# Patient Record
Sex: MALE | Race: WHITE | NOT HISPANIC OR LATINO | URBAN - METROPOLITAN AREA
[De-identification: names, ages, dates, MRNs, and addresses within clinical notes are randomized per-mention and may not be internally consistent; named-entity substitution may affect disease eponyms.]

---

## 2019-02-22 PROBLEM — Z00.00 ENCOUNTER FOR PREVENTIVE HEALTH EXAMINATION: Status: ACTIVE | Noted: 2019-02-22

## 2021-01-01 ENCOUNTER — INPATIENT (INPATIENT)
Facility: HOSPITAL | Age: 71
LOS: 9 days | End: 2021-07-14
Attending: INTERNAL MEDICINE | Admitting: INTERNAL MEDICINE
Payer: MEDICARE

## 2021-01-01 VITALS
RESPIRATION RATE: 22 BRPM | OXYGEN SATURATION: 97 % | DIASTOLIC BLOOD PRESSURE: 75 MMHG | HEART RATE: 63 BPM | SYSTOLIC BLOOD PRESSURE: 167 MMHG | TEMPERATURE: 98 F

## 2021-01-01 VITALS — OXYGEN SATURATION: 88 % | RESPIRATION RATE: 8 BRPM | HEART RATE: 82 BPM

## 2021-01-01 DIAGNOSIS — J96.90 RESPIRATORY FAILURE, UNSPECIFIED, UNSPECIFIED WHETHER WITH HYPOXIA OR HYPERCAPNIA: ICD-10-CM

## 2021-01-01 DIAGNOSIS — Z98.890 OTHER SPECIFIED POSTPROCEDURAL STATES: Chronic | ICD-10-CM

## 2021-01-01 DIAGNOSIS — J96.01 ACUTE RESPIRATORY FAILURE WITH HYPOXIA: ICD-10-CM

## 2021-01-01 DIAGNOSIS — Z86.69 PERSONAL HISTORY OF OTHER DISEASES OF THE NERVOUS SYSTEM AND SENSE ORGANS: ICD-10-CM

## 2021-01-01 DIAGNOSIS — R53.2 FUNCTIONAL QUADRIPLEGIA: ICD-10-CM

## 2021-01-01 DIAGNOSIS — Z51.5 ENCOUNTER FOR PALLIATIVE CARE: ICD-10-CM

## 2021-01-01 DIAGNOSIS — Z71.89 OTHER SPECIFIED COUNSELING: ICD-10-CM

## 2021-01-01 LAB
-  AMIKACIN: SIGNIFICANT CHANGE UP
-  AMOXICILLIN/CLAVULANIC ACID: SIGNIFICANT CHANGE UP
-  AMPICILLIN/SULBACTAM: SIGNIFICANT CHANGE UP
-  AMPICILLIN: SIGNIFICANT CHANGE UP
-  AZTREONAM: SIGNIFICANT CHANGE UP
-  CEFAZOLIN: SIGNIFICANT CHANGE UP
-  CEFEPIME: SIGNIFICANT CHANGE UP
-  CEFOXITIN: SIGNIFICANT CHANGE UP
-  CEFTRIAXONE: SIGNIFICANT CHANGE UP
-  CIPROFLOXACIN: SIGNIFICANT CHANGE UP
-  ERTAPENEM: SIGNIFICANT CHANGE UP
-  GENTAMICIN: SIGNIFICANT CHANGE UP
-  IMIPENEM: SIGNIFICANT CHANGE UP
-  LEVOFLOXACIN: SIGNIFICANT CHANGE UP
-  MEROPENEM: SIGNIFICANT CHANGE UP
-  NITROFURANTOIN: SIGNIFICANT CHANGE UP
-  PIPERACILLIN/TAZOBACTAM: SIGNIFICANT CHANGE UP
-  TIGECYCLINE: SIGNIFICANT CHANGE UP
-  TOBRAMYCIN: SIGNIFICANT CHANGE UP
-  TRIMETHOPRIM/SULFAMETHOXAZOLE: SIGNIFICANT CHANGE UP
A1C WITH ESTIMATED AVERAGE GLUCOSE RESULT: 5.2 % — SIGNIFICANT CHANGE UP (ref 4–5.6)
ACHR BLOCK AB SER-ACNC: 17 % — SIGNIFICANT CHANGE UP (ref 0–25)
ACHR MOD AB SER-ACNC: <12 % — SIGNIFICANT CHANGE UP (ref 0–20)
ACRM MODULATING ANTIBODY: <0.03 NMOL/L — SIGNIFICANT CHANGE UP (ref 0–0.24)
ALBUMIN SERPL ELPH-MCNC: 2.5 G/DL — LOW (ref 3.3–5)
ALBUMIN SERPL ELPH-MCNC: 2.6 G/DL — LOW (ref 3.3–5)
ALBUMIN SERPL ELPH-MCNC: 2.6 G/DL — LOW (ref 3.3–5)
ALBUMIN SERPL ELPH-MCNC: 2.7 G/DL — LOW (ref 3.3–5)
ALBUMIN SERPL ELPH-MCNC: 2.8 G/DL — LOW (ref 3.3–5)
ALBUMIN SERPL ELPH-MCNC: 2.9 G/DL — LOW (ref 3.3–5)
ALBUMIN SERPL ELPH-MCNC: 3.1 G/DL — LOW (ref 3.3–5)
ALBUMIN SERPL ELPH-MCNC: 3.1 G/DL — LOW (ref 3.3–5)
ALBUMIN SERPL ELPH-MCNC: 3.6 G/DL — SIGNIFICANT CHANGE UP (ref 3.3–5)
ALBUMIN SERPL ELPH-MCNC: 3.7 G/DL — SIGNIFICANT CHANGE UP (ref 3.3–5)
ALBUMIN SERPL ELPH-MCNC: 4.4 G/DL — SIGNIFICANT CHANGE UP (ref 3.3–5)
ALP SERPL-CCNC: 50 U/L — SIGNIFICANT CHANGE UP (ref 40–120)
ALP SERPL-CCNC: 50 U/L — SIGNIFICANT CHANGE UP (ref 40–120)
ALP SERPL-CCNC: 53 U/L — SIGNIFICANT CHANGE UP (ref 40–120)
ALP SERPL-CCNC: 54 U/L — SIGNIFICANT CHANGE UP (ref 40–120)
ALP SERPL-CCNC: 57 U/L — SIGNIFICANT CHANGE UP (ref 40–120)
ALP SERPL-CCNC: 59 U/L — SIGNIFICANT CHANGE UP (ref 40–120)
ALP SERPL-CCNC: 60 U/L — SIGNIFICANT CHANGE UP (ref 40–120)
ALP SERPL-CCNC: 63 U/L — SIGNIFICANT CHANGE UP (ref 40–120)
ALP SERPL-CCNC: 63 U/L — SIGNIFICANT CHANGE UP (ref 40–120)
ALP SERPL-CCNC: 65 U/L — SIGNIFICANT CHANGE UP (ref 40–120)
ALP SERPL-CCNC: 67 U/L — SIGNIFICANT CHANGE UP (ref 40–120)
ALP SERPL-CCNC: 69 U/L — SIGNIFICANT CHANGE UP (ref 40–120)
ALP SERPL-CCNC: 73 U/L — SIGNIFICANT CHANGE UP (ref 40–120)
ALP SERPL-CCNC: 87 U/L — SIGNIFICANT CHANGE UP (ref 40–120)
ALT FLD-CCNC: 20 U/L — SIGNIFICANT CHANGE UP (ref 4–41)
ALT FLD-CCNC: 20 U/L — SIGNIFICANT CHANGE UP (ref 4–41)
ALT FLD-CCNC: 22 U/L — SIGNIFICANT CHANGE UP (ref 4–41)
ALT FLD-CCNC: 23 U/L — SIGNIFICANT CHANGE UP (ref 4–41)
ALT FLD-CCNC: 24 U/L — SIGNIFICANT CHANGE UP (ref 4–41)
ALT FLD-CCNC: 27 U/L — SIGNIFICANT CHANGE UP (ref 4–41)
ALT FLD-CCNC: 27 U/L — SIGNIFICANT CHANGE UP (ref 4–41)
ALT FLD-CCNC: 30 U/L — SIGNIFICANT CHANGE UP (ref 4–41)
ALT FLD-CCNC: 30 U/L — SIGNIFICANT CHANGE UP (ref 4–41)
ALT FLD-CCNC: 35 U/L — SIGNIFICANT CHANGE UP (ref 4–41)
ALT FLD-CCNC: 62 U/L — HIGH (ref 4–41)
ALT FLD-CCNC: 69 U/L — HIGH (ref 4–41)
ALT FLD-CCNC: 70 U/L — HIGH (ref 4–41)
ALT FLD-CCNC: 78 U/L — HIGH (ref 4–41)
ANION GAP SERPL CALC-SCNC: 10 MMOL/L — SIGNIFICANT CHANGE UP (ref 7–14)
ANION GAP SERPL CALC-SCNC: 11 MMOL/L — SIGNIFICANT CHANGE UP (ref 7–14)
ANION GAP SERPL CALC-SCNC: 12 MMOL/L — SIGNIFICANT CHANGE UP (ref 7–14)
ANION GAP SERPL CALC-SCNC: 13 MMOL/L — SIGNIFICANT CHANGE UP (ref 7–14)
ANION GAP SERPL CALC-SCNC: 13 MMOL/L — SIGNIFICANT CHANGE UP (ref 7–14)
ANION GAP SERPL CALC-SCNC: 15 MMOL/L — HIGH (ref 7–14)
ANION GAP SERPL CALC-SCNC: 8 MMOL/L — SIGNIFICANT CHANGE UP (ref 7–14)
ANION GAP SERPL CALC-SCNC: 9 MMOL/L — SIGNIFICANT CHANGE UP (ref 7–14)
ANION GAP SERPL CALC-SCNC: 9 MMOL/L — SIGNIFICANT CHANGE UP (ref 7–14)
APPEARANCE UR: CLEAR — SIGNIFICANT CHANGE UP
APTT BLD: 27.2 SEC — SIGNIFICANT CHANGE UP (ref 27–36.3)
APTT BLD: 30.9 SEC — SIGNIFICANT CHANGE UP (ref 27–36.3)
AST SERPL-CCNC: 17 U/L — SIGNIFICANT CHANGE UP (ref 4–40)
AST SERPL-CCNC: 23 U/L — SIGNIFICANT CHANGE UP (ref 4–40)
AST SERPL-CCNC: 24 U/L — SIGNIFICANT CHANGE UP (ref 4–40)
AST SERPL-CCNC: 26 U/L — SIGNIFICANT CHANGE UP (ref 4–40)
AST SERPL-CCNC: 26 U/L — SIGNIFICANT CHANGE UP (ref 4–40)
AST SERPL-CCNC: 27 U/L — SIGNIFICANT CHANGE UP (ref 4–40)
AST SERPL-CCNC: 29 U/L — SIGNIFICANT CHANGE UP (ref 4–40)
AST SERPL-CCNC: 30 U/L — SIGNIFICANT CHANGE UP (ref 4–40)
AST SERPL-CCNC: 37 U/L — SIGNIFICANT CHANGE UP (ref 4–40)
AST SERPL-CCNC: 38 U/L — SIGNIFICANT CHANGE UP (ref 4–40)
AST SERPL-CCNC: 50 U/L — HIGH (ref 4–40)
AST SERPL-CCNC: 53 U/L — HIGH (ref 4–40)
AST SERPL-CCNC: 57 U/L — HIGH (ref 4–40)
AST SERPL-CCNC: 94 U/L — HIGH (ref 4–40)
B PERT DNA SPEC QL NAA+PROBE: SIGNIFICANT CHANGE UP
B PERT IGG+IGM PNL SER: ABNORMAL
BACTERIA # UR AUTO: ABNORMAL
BASE EXCESS BLDV CALC-SCNC: 4.2 MMOL/L — HIGH (ref -3–2)
BASE EXCESS BLDV CALC-SCNC: 5.1 MMOL/L — HIGH (ref -3–2)
BASE EXCESS BLDV CALC-SCNC: 8.8 MMOL/L — HIGH (ref -3–2)
BASOPHILS # BLD AUTO: 0 K/UL — SIGNIFICANT CHANGE UP (ref 0–0.2)
BASOPHILS # BLD AUTO: 0.01 K/UL — SIGNIFICANT CHANGE UP (ref 0–0.2)
BASOPHILS # BLD AUTO: 0.02 K/UL — SIGNIFICANT CHANGE UP (ref 0–0.2)
BASOPHILS # BLD AUTO: 0.03 K/UL — SIGNIFICANT CHANGE UP (ref 0–0.2)
BASOPHILS # BLD AUTO: 0.05 K/UL — SIGNIFICANT CHANGE UP (ref 0–0.2)
BASOPHILS # BLD AUTO: 0.05 K/UL — SIGNIFICANT CHANGE UP (ref 0–0.2)
BASOPHILS NFR BLD AUTO: 0 % — SIGNIFICANT CHANGE UP (ref 0–2)
BASOPHILS NFR BLD AUTO: 0.1 % — SIGNIFICANT CHANGE UP (ref 0–2)
BASOPHILS NFR BLD AUTO: 0.1 % — SIGNIFICANT CHANGE UP (ref 0–2)
BASOPHILS NFR BLD AUTO: 0.2 % — SIGNIFICANT CHANGE UP (ref 0–2)
BASOPHILS NFR BLD AUTO: 0.3 % — SIGNIFICANT CHANGE UP (ref 0–2)
BASOPHILS NFR BLD AUTO: 0.3 % — SIGNIFICANT CHANGE UP (ref 0–2)
BASOPHILS NFR BLD AUTO: 0.4 % — SIGNIFICANT CHANGE UP (ref 0–2)
BILIRUB SERPL-MCNC: 0.2 MG/DL — SIGNIFICANT CHANGE UP (ref 0.2–1.2)
BILIRUB SERPL-MCNC: 0.2 MG/DL — SIGNIFICANT CHANGE UP (ref 0.2–1.2)
BILIRUB SERPL-MCNC: 0.3 MG/DL — SIGNIFICANT CHANGE UP (ref 0.2–1.2)
BILIRUB SERPL-MCNC: 0.3 MG/DL — SIGNIFICANT CHANGE UP (ref 0.2–1.2)
BILIRUB SERPL-MCNC: 0.4 MG/DL — SIGNIFICANT CHANGE UP (ref 0.2–1.2)
BILIRUB SERPL-MCNC: 0.5 MG/DL — SIGNIFICANT CHANGE UP (ref 0.2–1.2)
BILIRUB SERPL-MCNC: 0.6 MG/DL — SIGNIFICANT CHANGE UP (ref 0.2–1.2)
BILIRUB SERPL-MCNC: 0.7 MG/DL — SIGNIFICANT CHANGE UP (ref 0.2–1.2)
BILIRUB UR-MCNC: NEGATIVE — SIGNIFICANT CHANGE UP
BLOOD GAS ARTERIAL - LYTES,HGB,ICA,LACT RESULT: SIGNIFICANT CHANGE UP
BLOOD GAS ARTERIAL - LYTES,HGB,ICA,LACT RESULT: SIGNIFICANT CHANGE UP
BLOOD GAS ARTERIAL COMPREHENSIVE RESULT: SIGNIFICANT CHANGE UP
BLOOD GAS VENOUS - CREATININE: 0.6 MG/DL — SIGNIFICANT CHANGE UP (ref 0.5–1.3)
BLOOD GAS VENOUS - CREATININE: 0.8 MG/DL — SIGNIFICANT CHANGE UP (ref 0.5–1.3)
BLOOD GAS VENOUS - CREATININE: SIGNIFICANT CHANGE UP MG/DL (ref 0.5–1.3)
BLOOD GAS VENOUS COMPREHENSIVE RESULT: SIGNIFICANT CHANGE UP
BUN SERPL-MCNC: 10 MG/DL — SIGNIFICANT CHANGE UP (ref 7–23)
BUN SERPL-MCNC: 12 MG/DL — SIGNIFICANT CHANGE UP (ref 7–23)
BUN SERPL-MCNC: 13 MG/DL — SIGNIFICANT CHANGE UP (ref 7–23)
BUN SERPL-MCNC: 14 MG/DL — SIGNIFICANT CHANGE UP (ref 7–23)
BUN SERPL-MCNC: 15 MG/DL — SIGNIFICANT CHANGE UP (ref 7–23)
BUN SERPL-MCNC: 16 MG/DL — SIGNIFICANT CHANGE UP (ref 7–23)
BUN SERPL-MCNC: 16 MG/DL — SIGNIFICANT CHANGE UP (ref 7–23)
BUN SERPL-MCNC: 17 MG/DL — SIGNIFICANT CHANGE UP (ref 7–23)
BUN SERPL-MCNC: 17 MG/DL — SIGNIFICANT CHANGE UP (ref 7–23)
BUN SERPL-MCNC: 18 MG/DL — SIGNIFICANT CHANGE UP (ref 7–23)
BUN SERPL-MCNC: 21 MG/DL — SIGNIFICANT CHANGE UP (ref 7–23)
BUN SERPL-MCNC: 22 MG/DL — SIGNIFICANT CHANGE UP (ref 7–23)
C PNEUM DNA SPEC QL NAA+PROBE: SIGNIFICANT CHANGE UP
CALCIUM SERPL-MCNC: 8 MG/DL — LOW (ref 8.4–10.5)
CALCIUM SERPL-MCNC: 8.2 MG/DL — LOW (ref 8.4–10.5)
CALCIUM SERPL-MCNC: 8.2 MG/DL — LOW (ref 8.4–10.5)
CALCIUM SERPL-MCNC: 8.3 MG/DL — LOW (ref 8.4–10.5)
CALCIUM SERPL-MCNC: 8.3 MG/DL — LOW (ref 8.4–10.5)
CALCIUM SERPL-MCNC: 8.5 MG/DL — SIGNIFICANT CHANGE UP (ref 8.4–10.5)
CALCIUM SERPL-MCNC: 8.6 MG/DL — SIGNIFICANT CHANGE UP (ref 8.4–10.5)
CALCIUM SERPL-MCNC: 8.7 MG/DL — SIGNIFICANT CHANGE UP (ref 8.4–10.5)
CALCIUM SERPL-MCNC: 8.8 MG/DL — SIGNIFICANT CHANGE UP (ref 8.4–10.5)
CALCIUM SERPL-MCNC: 8.8 MG/DL — SIGNIFICANT CHANGE UP (ref 8.4–10.5)
CALCIUM SERPL-MCNC: 9.1 MG/DL — SIGNIFICANT CHANGE UP (ref 8.4–10.5)
CALCIUM SERPL-MCNC: 9.2 MG/DL — SIGNIFICANT CHANGE UP (ref 8.4–10.5)
CALCIUM SERPL-MCNC: 9.3 MG/DL — SIGNIFICANT CHANGE UP (ref 8.4–10.5)
CALCIUM SERPL-MCNC: 9.3 MG/DL — SIGNIFICANT CHANGE UP (ref 8.4–10.5)
CHLORIDE BLDV-SCNC: 101 MMOL/L — SIGNIFICANT CHANGE UP (ref 96–108)
CHLORIDE BLDV-SCNC: 103 MMOL/L — SIGNIFICANT CHANGE UP (ref 96–108)
CHLORIDE BLDV-SCNC: 99 MMOL/L — SIGNIFICANT CHANGE UP (ref 96–108)
CHLORIDE SERPL-SCNC: 100 MMOL/L — SIGNIFICANT CHANGE UP (ref 98–107)
CHLORIDE SERPL-SCNC: 101 MMOL/L — SIGNIFICANT CHANGE UP (ref 98–107)
CHLORIDE SERPL-SCNC: 101 MMOL/L — SIGNIFICANT CHANGE UP (ref 98–107)
CHLORIDE SERPL-SCNC: 102 MMOL/L — SIGNIFICANT CHANGE UP (ref 98–107)
CHLORIDE SERPL-SCNC: 91 MMOL/L — LOW (ref 98–107)
CHLORIDE SERPL-SCNC: 92 MMOL/L — LOW (ref 98–107)
CHLORIDE SERPL-SCNC: 94 MMOL/L — LOW (ref 98–107)
CHLORIDE SERPL-SCNC: 98 MMOL/L — SIGNIFICANT CHANGE UP (ref 98–107)
CHLORIDE SERPL-SCNC: 98 MMOL/L — SIGNIFICANT CHANGE UP (ref 98–107)
CHLORIDE SERPL-SCNC: 99 MMOL/L — SIGNIFICANT CHANGE UP (ref 98–107)
CHLORIDE SERPL-SCNC: 99 MMOL/L — SIGNIFICANT CHANGE UP (ref 98–107)
CO2 SERPL-SCNC: 22 MMOL/L — SIGNIFICANT CHANGE UP (ref 22–31)
CO2 SERPL-SCNC: 23 MMOL/L — SIGNIFICANT CHANGE UP (ref 22–31)
CO2 SERPL-SCNC: 24 MMOL/L — SIGNIFICANT CHANGE UP (ref 22–31)
CO2 SERPL-SCNC: 25 MMOL/L — SIGNIFICANT CHANGE UP (ref 22–31)
CO2 SERPL-SCNC: 26 MMOL/L — SIGNIFICANT CHANGE UP (ref 22–31)
CO2 SERPL-SCNC: 27 MMOL/L — SIGNIFICANT CHANGE UP (ref 22–31)
CO2 SERPL-SCNC: 27 MMOL/L — SIGNIFICANT CHANGE UP (ref 22–31)
CO2 SERPL-SCNC: 30 MMOL/L — SIGNIFICANT CHANGE UP (ref 22–31)
CO2 SERPL-SCNC: 30 MMOL/L — SIGNIFICANT CHANGE UP (ref 22–31)
CO2 SERPL-SCNC: 32 MMOL/L — HIGH (ref 22–31)
COLOR FLD: SIGNIFICANT CHANGE UP
COLOR SPEC: SIGNIFICANT CHANGE UP
COLOR SPEC: YELLOW — SIGNIFICANT CHANGE UP
COLOR SPEC: YELLOW — SIGNIFICANT CHANGE UP
CORTIS AM PEAK SERPL-MCNC: 7.1 UG/DL — SIGNIFICANT CHANGE UP (ref 6–18.4)
COVID-19 NUCLEOCAPSID GAM AB INTERP: NEGATIVE — SIGNIFICANT CHANGE UP
COVID-19 NUCLEOCAPSID TOTAL GAM ANTIBODY RESULT: 0.11 INDEX — SIGNIFICANT CHANGE UP
COVID-19 SPIKE DOMAIN AB INTERP: POSITIVE
COVID-19 SPIKE DOMAIN ANTIBODY RESULT: >250 U/ML — HIGH
CREAT ?TM UR-MCNC: 173 MG/DL — SIGNIFICANT CHANGE UP
CREAT SERPL-MCNC: 0.46 MG/DL — LOW (ref 0.5–1.3)
CREAT SERPL-MCNC: 0.52 MG/DL — SIGNIFICANT CHANGE UP (ref 0.5–1.3)
CREAT SERPL-MCNC: 0.53 MG/DL — SIGNIFICANT CHANGE UP (ref 0.5–1.3)
CREAT SERPL-MCNC: 0.55 MG/DL — SIGNIFICANT CHANGE UP (ref 0.5–1.3)
CREAT SERPL-MCNC: 0.55 MG/DL — SIGNIFICANT CHANGE UP (ref 0.5–1.3)
CREAT SERPL-MCNC: 0.56 MG/DL — SIGNIFICANT CHANGE UP (ref 0.5–1.3)
CREAT SERPL-MCNC: 0.57 MG/DL — SIGNIFICANT CHANGE UP (ref 0.5–1.3)
CREAT SERPL-MCNC: 0.62 MG/DL — SIGNIFICANT CHANGE UP (ref 0.5–1.3)
CREAT SERPL-MCNC: 0.67 MG/DL — SIGNIFICANT CHANGE UP (ref 0.5–1.3)
CREAT SERPL-MCNC: 0.68 MG/DL — SIGNIFICANT CHANGE UP (ref 0.5–1.3)
CREAT SERPL-MCNC: 0.73 MG/DL — SIGNIFICANT CHANGE UP (ref 0.5–1.3)
CREAT SERPL-MCNC: 0.89 MG/DL — SIGNIFICANT CHANGE UP (ref 0.5–1.3)
CULTURE RESULTS: SIGNIFICANT CHANGE UP
DIFF PNL FLD: ABNORMAL
EOSINOPHIL # BLD AUTO: 0 K/UL — SIGNIFICANT CHANGE UP (ref 0–0.5)
EOSINOPHIL # BLD AUTO: 0.01 K/UL — SIGNIFICANT CHANGE UP (ref 0–0.5)
EOSINOPHIL # BLD AUTO: 0.01 K/UL — SIGNIFICANT CHANGE UP (ref 0–0.5)
EOSINOPHIL # BLD AUTO: 0.02 K/UL — SIGNIFICANT CHANGE UP (ref 0–0.5)
EOSINOPHIL # BLD AUTO: 0.05 K/UL — SIGNIFICANT CHANGE UP (ref 0–0.5)
EOSINOPHIL # BLD AUTO: 0.1 K/UL — SIGNIFICANT CHANGE UP (ref 0–0.5)
EOSINOPHIL # BLD AUTO: 0.11 K/UL — SIGNIFICANT CHANGE UP (ref 0–0.5)
EOSINOPHIL # BLD AUTO: 0.12 K/UL — SIGNIFICANT CHANGE UP (ref 0–0.5)
EOSINOPHIL # BLD AUTO: 0.13 K/UL — SIGNIFICANT CHANGE UP (ref 0–0.5)
EOSINOPHIL # FLD: 0 % — SIGNIFICANT CHANGE UP
EOSINOPHIL NFR BLD AUTO: 0 % — SIGNIFICANT CHANGE UP (ref 0–6)
EOSINOPHIL NFR BLD AUTO: 0.1 % — SIGNIFICANT CHANGE UP (ref 0–6)
EOSINOPHIL NFR BLD AUTO: 0.1 % — SIGNIFICANT CHANGE UP (ref 0–6)
EOSINOPHIL NFR BLD AUTO: 0.2 % — SIGNIFICANT CHANGE UP (ref 0–6)
EOSINOPHIL NFR BLD AUTO: 0.4 % — SIGNIFICANT CHANGE UP (ref 0–6)
EOSINOPHIL NFR BLD AUTO: 1.2 % — SIGNIFICANT CHANGE UP (ref 0–6)
EOSINOPHIL NFR BLD AUTO: 1.3 % — SIGNIFICANT CHANGE UP (ref 0–6)
EOSINOPHIL NFR BLD AUTO: 1.4 % — SIGNIFICANT CHANGE UP (ref 0–6)
EOSINOPHIL NFR BLD AUTO: 1.5 % — SIGNIFICANT CHANGE UP (ref 0–6)
EPI CELLS # UR: 0 /HPF — SIGNIFICANT CHANGE UP (ref 0–5)
ESTIMATED AVERAGE GLUCOSE: 103 — SIGNIFICANT CHANGE UP
FLUAV SUBTYP SPEC NAA+PROBE: SIGNIFICANT CHANGE UP
FLUBV RNA SPEC QL NAA+PROBE: SIGNIFICANT CHANGE UP
FLUID INTAKE SUBSTANCE CLASS: SIGNIFICANT CHANGE UP
FLUID SEGMENTED GRANULOCYTES: 67 % — SIGNIFICANT CHANGE UP
FOLATE SERPL-MCNC: 19.3 NG/ML — HIGH (ref 3.1–17.5)
FOLATE+VIT B12 SERBLD-IMP: 0 % — SIGNIFICANT CHANGE UP
GAS PNL BLDV: 132 MMOL/L — LOW (ref 136–146)
GAS PNL BLDV: 135 MMOL/L — LOW (ref 136–146)
GAS PNL BLDV: 137 MMOL/L — SIGNIFICANT CHANGE UP (ref 136–146)
GLUCOSE BLDC GLUCOMTR-MCNC: 100 MG/DL — HIGH (ref 70–99)
GLUCOSE BLDC GLUCOMTR-MCNC: 100 MG/DL — HIGH (ref 70–99)
GLUCOSE BLDC GLUCOMTR-MCNC: 102 MG/DL — HIGH (ref 70–99)
GLUCOSE BLDC GLUCOMTR-MCNC: 102 MG/DL — HIGH (ref 70–99)
GLUCOSE BLDC GLUCOMTR-MCNC: 104 MG/DL — HIGH (ref 70–99)
GLUCOSE BLDC GLUCOMTR-MCNC: 105 MG/DL — HIGH (ref 70–99)
GLUCOSE BLDC GLUCOMTR-MCNC: 105 MG/DL — HIGH (ref 70–99)
GLUCOSE BLDC GLUCOMTR-MCNC: 108 MG/DL — HIGH (ref 70–99)
GLUCOSE BLDC GLUCOMTR-MCNC: 108 MG/DL — HIGH (ref 70–99)
GLUCOSE BLDC GLUCOMTR-MCNC: 110 MG/DL — HIGH (ref 70–99)
GLUCOSE BLDC GLUCOMTR-MCNC: 115 MG/DL — HIGH (ref 70–99)
GLUCOSE BLDC GLUCOMTR-MCNC: 123 MG/DL — HIGH (ref 70–99)
GLUCOSE BLDC GLUCOMTR-MCNC: 124 MG/DL — HIGH (ref 70–99)
GLUCOSE BLDC GLUCOMTR-MCNC: 141 MG/DL — HIGH (ref 70–99)
GLUCOSE BLDC GLUCOMTR-MCNC: 164 MG/DL — HIGH (ref 70–99)
GLUCOSE BLDC GLUCOMTR-MCNC: 98 MG/DL — SIGNIFICANT CHANGE UP (ref 70–99)
GLUCOSE BLDV-MCNC: 117 MG/DL — HIGH (ref 70–99)
GLUCOSE BLDV-MCNC: 124 MG/DL — HIGH (ref 70–99)
GLUCOSE BLDV-MCNC: 156 MG/DL — HIGH (ref 70–99)
GLUCOSE SERPL-MCNC: 100 MG/DL — HIGH (ref 70–99)
GLUCOSE SERPL-MCNC: 101 MG/DL — HIGH (ref 70–99)
GLUCOSE SERPL-MCNC: 109 MG/DL — HIGH (ref 70–99)
GLUCOSE SERPL-MCNC: 109 MG/DL — HIGH (ref 70–99)
GLUCOSE SERPL-MCNC: 110 MG/DL — HIGH (ref 70–99)
GLUCOSE SERPL-MCNC: 113 MG/DL — HIGH (ref 70–99)
GLUCOSE SERPL-MCNC: 121 MG/DL — HIGH (ref 70–99)
GLUCOSE SERPL-MCNC: 122 MG/DL — HIGH (ref 70–99)
GLUCOSE SERPL-MCNC: 122 MG/DL — HIGH (ref 70–99)
GLUCOSE SERPL-MCNC: 130 MG/DL — HIGH (ref 70–99)
GLUCOSE SERPL-MCNC: 133 MG/DL — HIGH (ref 70–99)
GLUCOSE SERPL-MCNC: 137 MG/DL — HIGH (ref 70–99)
GLUCOSE SERPL-MCNC: 137 MG/DL — HIGH (ref 70–99)
GLUCOSE SERPL-MCNC: 88 MG/DL — SIGNIFICANT CHANGE UP (ref 70–99)
GLUCOSE UR QL: NEGATIVE — SIGNIFICANT CHANGE UP
GRAM STN FLD: SIGNIFICANT CHANGE UP
HADV DNA SPEC QL NAA+PROBE: SIGNIFICANT CHANGE UP
HCO3 BLDV-SCNC: 28 MMOL/L — HIGH (ref 20–27)
HCO3 BLDV-SCNC: 29 MMOL/L — HIGH (ref 20–27)
HCO3 BLDV-SCNC: 32 MMOL/L — HIGH (ref 20–27)
HCOV 229E RNA SPEC QL NAA+PROBE: SIGNIFICANT CHANGE UP
HCOV HKU1 RNA SPEC QL NAA+PROBE: SIGNIFICANT CHANGE UP
HCOV NL63 RNA SPEC QL NAA+PROBE: SIGNIFICANT CHANGE UP
HCOV OC43 RNA SPEC QL NAA+PROBE: SIGNIFICANT CHANGE UP
HCT VFR BLD CALC: 34.4 % — LOW (ref 39–50)
HCT VFR BLD CALC: 35.1 % — LOW (ref 39–50)
HCT VFR BLD CALC: 35.2 % — LOW (ref 39–50)
HCT VFR BLD CALC: 35.2 % — LOW (ref 39–50)
HCT VFR BLD CALC: 36.3 % — LOW (ref 39–50)
HCT VFR BLD CALC: 36.7 % — LOW (ref 39–50)
HCT VFR BLD CALC: 36.7 % — LOW (ref 39–50)
HCT VFR BLD CALC: 38.5 % — LOW (ref 39–50)
HCT VFR BLD CALC: 38.7 % — LOW (ref 39–50)
HCT VFR BLD CALC: 39.4 % — SIGNIFICANT CHANGE UP (ref 39–50)
HCT VFR BLD CALC: 40.2 % — SIGNIFICANT CHANGE UP (ref 39–50)
HCT VFR BLD CALC: 40.8 % — SIGNIFICANT CHANGE UP (ref 39–50)
HCT VFR BLD CALC: 43.9 % — SIGNIFICANT CHANGE UP (ref 39–50)
HCT VFR BLD CALC: 46.3 % — SIGNIFICANT CHANGE UP (ref 39–50)
HCT VFR BLDA CALC: 36.2 % — LOW (ref 39–51)
HCT VFR BLDA CALC: 38.9 % — LOW (ref 39–51)
HCT VFR BLDA CALC: 39.7 % — SIGNIFICANT CHANGE UP (ref 39–51)
HCV AB S/CO SERPL IA: 0.09 S/CO — SIGNIFICANT CHANGE UP (ref 0–0.99)
HCV AB SERPL-IMP: SIGNIFICANT CHANGE UP
HGB BLD CALC-MCNC: 11.7 G/DL — LOW (ref 13–17)
HGB BLD CALC-MCNC: 12.6 G/DL — LOW (ref 13–17)
HGB BLD CALC-MCNC: 12.9 G/DL — LOW (ref 13–17)
HGB BLD-MCNC: 11.3 G/DL — LOW (ref 13–17)
HGB BLD-MCNC: 11.4 G/DL — LOW (ref 13–17)
HGB BLD-MCNC: 11.5 G/DL — LOW (ref 13–17)
HGB BLD-MCNC: 11.5 G/DL — LOW (ref 13–17)
HGB BLD-MCNC: 12.1 G/DL — LOW (ref 13–17)
HGB BLD-MCNC: 12.2 G/DL — LOW (ref 13–17)
HGB BLD-MCNC: 12.2 G/DL — LOW (ref 13–17)
HGB BLD-MCNC: 12.6 G/DL — LOW (ref 13–17)
HGB BLD-MCNC: 12.7 G/DL — LOW (ref 13–17)
HGB BLD-MCNC: 12.9 G/DL — LOW (ref 13–17)
HGB BLD-MCNC: 13.1 G/DL — SIGNIFICANT CHANGE UP (ref 13–17)
HGB BLD-MCNC: 13.1 G/DL — SIGNIFICANT CHANGE UP (ref 13–17)
HGB BLD-MCNC: 14.4 G/DL — SIGNIFICANT CHANGE UP (ref 13–17)
HGB BLD-MCNC: 14.9 G/DL — SIGNIFICANT CHANGE UP (ref 13–17)
HMPV RNA SPEC QL NAA+PROBE: SIGNIFICANT CHANGE UP
HPIV1 RNA SPEC QL NAA+PROBE: SIGNIFICANT CHANGE UP
HPIV2 RNA SPEC QL NAA+PROBE: SIGNIFICANT CHANGE UP
HPIV3 RNA SPEC QL NAA+PROBE: SIGNIFICANT CHANGE UP
HPIV4 RNA SPEC QL NAA+PROBE: SIGNIFICANT CHANGE UP
HYALINE CASTS # UR AUTO: 1 /LPF — SIGNIFICANT CHANGE UP (ref 0–7)
IANC: 10.55 K/UL — HIGH (ref 1.5–8.5)
IANC: 10.75 K/UL — HIGH (ref 1.5–8.5)
IANC: 11.13 K/UL — HIGH (ref 1.5–8.5)
IANC: 11.66 K/UL — HIGH (ref 1.5–8.5)
IANC: 15.17 K/UL — HIGH (ref 1.5–8.5)
IANC: 15.29 K/UL — HIGH (ref 1.5–8.5)
IANC: 15.82 K/UL — HIGH (ref 1.5–8.5)
IANC: 5.64 K/UL — SIGNIFICANT CHANGE UP (ref 1.5–8.5)
IANC: 6.34 K/UL — SIGNIFICANT CHANGE UP (ref 1.5–8.5)
IANC: 6.34 K/UL — SIGNIFICANT CHANGE UP (ref 1.5–8.5)
IANC: 6.58 K/UL — SIGNIFICANT CHANGE UP (ref 1.5–8.5)
IANC: 8.34 K/UL — SIGNIFICANT CHANGE UP (ref 1.5–8.5)
IANC: 8.39 K/UL — SIGNIFICANT CHANGE UP (ref 1.5–8.5)
IANC: 8.76 K/UL — HIGH (ref 1.5–8.5)
IMM GRANULOCYTES NFR BLD AUTO: 0.5 % — SIGNIFICANT CHANGE UP (ref 0–1.5)
IMM GRANULOCYTES NFR BLD AUTO: 0.6 % — SIGNIFICANT CHANGE UP (ref 0–1.5)
IMM GRANULOCYTES NFR BLD AUTO: 0.7 % — SIGNIFICANT CHANGE UP (ref 0–1.5)
IMM GRANULOCYTES NFR BLD AUTO: 1.1 % — SIGNIFICANT CHANGE UP (ref 0–1.5)
IMM GRANULOCYTES NFR BLD AUTO: 1.6 % — HIGH (ref 0–1.5)
IMM GRANULOCYTES NFR BLD AUTO: 1.6 % — HIGH (ref 0–1.5)
IMM GRANULOCYTES NFR BLD AUTO: 2.7 % — HIGH (ref 0–1.5)
INR BLD: 1.21 RATIO — HIGH (ref 0.88–1.16)
KETONES UR-MCNC: ABNORMAL
KETONES UR-MCNC: ABNORMAL
KETONES UR-MCNC: NEGATIVE — SIGNIFICANT CHANGE UP
LACTATE BLDV-MCNC: 1.1 MMOL/L — SIGNIFICANT CHANGE UP (ref 0.5–2)
LACTATE BLDV-MCNC: 1.3 MMOL/L — SIGNIFICANT CHANGE UP (ref 0.5–2)
LACTATE BLDV-MCNC: 1.4 MMOL/L — SIGNIFICANT CHANGE UP (ref 0.5–2)
LEUKOCYTE ESTERASE UR-ACNC: ABNORMAL
LEUKOCYTE ESTERASE UR-ACNC: NEGATIVE — SIGNIFICANT CHANGE UP
LEUKOCYTE ESTERASE UR-ACNC: NEGATIVE — SIGNIFICANT CHANGE UP
LYMPHOCYTES # BLD AUTO: 0.54 K/UL — LOW (ref 1–3.3)
LYMPHOCYTES # BLD AUTO: 0.71 K/UL — LOW (ref 1–3.3)
LYMPHOCYTES # BLD AUTO: 0.79 K/UL — LOW (ref 1–3.3)
LYMPHOCYTES # BLD AUTO: 0.81 K/UL — LOW (ref 1–3.3)
LYMPHOCYTES # BLD AUTO: 0.91 K/UL — LOW (ref 1–3.3)
LYMPHOCYTES # BLD AUTO: 0.96 K/UL — LOW (ref 1–3.3)
LYMPHOCYTES # BLD AUTO: 1.12 K/UL — SIGNIFICANT CHANGE UP (ref 1–3.3)
LYMPHOCYTES # BLD AUTO: 1.2 K/UL — SIGNIFICANT CHANGE UP (ref 1–3.3)
LYMPHOCYTES # BLD AUTO: 1.21 K/UL — SIGNIFICANT CHANGE UP (ref 1–3.3)
LYMPHOCYTES # BLD AUTO: 1.23 K/UL — SIGNIFICANT CHANGE UP (ref 1–3.3)
LYMPHOCYTES # BLD AUTO: 1.23 K/UL — SIGNIFICANT CHANGE UP (ref 1–3.3)
LYMPHOCYTES # BLD AUTO: 1.51 K/UL — SIGNIFICANT CHANGE UP (ref 1–3.3)
LYMPHOCYTES # BLD AUTO: 1.75 K/UL — SIGNIFICANT CHANGE UP (ref 1–3.3)
LYMPHOCYTES # BLD AUTO: 13.6 % — SIGNIFICANT CHANGE UP (ref 13–44)
LYMPHOCYTES # BLD AUTO: 13.7 % — SIGNIFICANT CHANGE UP (ref 13–44)
LYMPHOCYTES # BLD AUTO: 14 % — SIGNIFICANT CHANGE UP (ref 13–44)
LYMPHOCYTES # BLD AUTO: 14.1 % — SIGNIFICANT CHANGE UP (ref 13–44)
LYMPHOCYTES # BLD AUTO: 14.2 % — SIGNIFICANT CHANGE UP (ref 13–44)
LYMPHOCYTES # BLD AUTO: 14.8 % — SIGNIFICANT CHANGE UP (ref 13–44)
LYMPHOCYTES # BLD AUTO: 19.4 % — SIGNIFICANT CHANGE UP (ref 13–44)
LYMPHOCYTES # BLD AUTO: 2.4 K/UL — SIGNIFICANT CHANGE UP (ref 1–3.3)
LYMPHOCYTES # BLD AUTO: 3.1 % — LOW (ref 13–44)
LYMPHOCYTES # BLD AUTO: 4.5 % — LOW (ref 13–44)
LYMPHOCYTES # BLD AUTO: 5.3 % — LOW (ref 13–44)
LYMPHOCYTES # BLD AUTO: 5.4 % — LOW (ref 13–44)
LYMPHOCYTES # BLD AUTO: 6 % — LOW (ref 13–44)
LYMPHOCYTES # BLD AUTO: 7.3 % — LOW (ref 13–44)
LYMPHOCYTES # BLD AUTO: 9.7 % — LOW (ref 13–44)
LYMPHOCYTES # FLD: 7 % — SIGNIFICANT CHANGE UP
MAGNESIUM SERPL-MCNC: 1.7 MG/DL — SIGNIFICANT CHANGE UP (ref 1.6–2.6)
MAGNESIUM SERPL-MCNC: 1.8 MG/DL — SIGNIFICANT CHANGE UP (ref 1.6–2.6)
MAGNESIUM SERPL-MCNC: 1.9 MG/DL — SIGNIFICANT CHANGE UP (ref 1.6–2.6)
MAGNESIUM SERPL-MCNC: 2 MG/DL — SIGNIFICANT CHANGE UP (ref 1.6–2.6)
MAGNESIUM SERPL-MCNC: 2.1 MG/DL — SIGNIFICANT CHANGE UP (ref 1.6–2.6)
MAGNESIUM SERPL-MCNC: 2.2 MG/DL — SIGNIFICANT CHANGE UP (ref 1.6–2.6)
MCHC RBC-ENTMCNC: 30 PG — SIGNIFICANT CHANGE UP (ref 27–34)
MCHC RBC-ENTMCNC: 30.4 PG — SIGNIFICANT CHANGE UP (ref 27–34)
MCHC RBC-ENTMCNC: 30.4 PG — SIGNIFICANT CHANGE UP (ref 27–34)
MCHC RBC-ENTMCNC: 30.5 PG — SIGNIFICANT CHANGE UP (ref 27–34)
MCHC RBC-ENTMCNC: 30.5 PG — SIGNIFICANT CHANGE UP (ref 27–34)
MCHC RBC-ENTMCNC: 30.6 PG — SIGNIFICANT CHANGE UP (ref 27–34)
MCHC RBC-ENTMCNC: 30.7 PG — SIGNIFICANT CHANGE UP (ref 27–34)
MCHC RBC-ENTMCNC: 31 PG — SIGNIFICANT CHANGE UP (ref 27–34)
MCHC RBC-ENTMCNC: 31.1 PG — SIGNIFICANT CHANGE UP (ref 27–34)
MCHC RBC-ENTMCNC: 31.2 PG — SIGNIFICANT CHANGE UP (ref 27–34)
MCHC RBC-ENTMCNC: 31.4 PG — SIGNIFICANT CHANGE UP (ref 27–34)
MCHC RBC-ENTMCNC: 31.6 GM/DL — LOW (ref 32–36)
MCHC RBC-ENTMCNC: 32.1 GM/DL — SIGNIFICANT CHANGE UP (ref 32–36)
MCHC RBC-ENTMCNC: 32.2 GM/DL — SIGNIFICANT CHANGE UP (ref 32–36)
MCHC RBC-ENTMCNC: 32.5 GM/DL — SIGNIFICANT CHANGE UP (ref 32–36)
MCHC RBC-ENTMCNC: 32.6 GM/DL — SIGNIFICANT CHANGE UP (ref 32–36)
MCHC RBC-ENTMCNC: 32.7 GM/DL — SIGNIFICANT CHANGE UP (ref 32–36)
MCHC RBC-ENTMCNC: 32.7 GM/DL — SIGNIFICANT CHANGE UP (ref 32–36)
MCHC RBC-ENTMCNC: 32.8 GM/DL — SIGNIFICANT CHANGE UP (ref 32–36)
MCHC RBC-ENTMCNC: 32.8 GM/DL — SIGNIFICANT CHANGE UP (ref 32–36)
MCHC RBC-ENTMCNC: 33 GM/DL — SIGNIFICANT CHANGE UP (ref 32–36)
MCHC RBC-ENTMCNC: 33.2 GM/DL — SIGNIFICANT CHANGE UP (ref 32–36)
MCHC RBC-ENTMCNC: 33.2 GM/DL — SIGNIFICANT CHANGE UP (ref 32–36)
MCHC RBC-ENTMCNC: 33.5 GM/DL — SIGNIFICANT CHANGE UP (ref 32–36)
MCHC RBC-ENTMCNC: 33.6 GM/DL — SIGNIFICANT CHANGE UP (ref 32–36)
MCV RBC AUTO: 91.9 FL — SIGNIFICANT CHANGE UP (ref 80–100)
MCV RBC AUTO: 92.2 FL — SIGNIFICANT CHANGE UP (ref 80–100)
MCV RBC AUTO: 92.2 FL — SIGNIFICANT CHANGE UP (ref 80–100)
MCV RBC AUTO: 92.6 FL — SIGNIFICANT CHANGE UP (ref 80–100)
MCV RBC AUTO: 92.8 FL — SIGNIFICANT CHANGE UP (ref 80–100)
MCV RBC AUTO: 93.1 FL — SIGNIFICANT CHANGE UP (ref 80–100)
MCV RBC AUTO: 93.1 FL — SIGNIFICANT CHANGE UP (ref 80–100)
MCV RBC AUTO: 94.8 FL — SIGNIFICANT CHANGE UP (ref 80–100)
MCV RBC AUTO: 94.9 FL — SIGNIFICANT CHANGE UP (ref 80–100)
MCV RBC AUTO: 94.9 FL — SIGNIFICANT CHANGE UP (ref 80–100)
MCV RBC AUTO: 95 FL — SIGNIFICANT CHANGE UP (ref 80–100)
MCV RBC AUTO: 95.9 FL — SIGNIFICANT CHANGE UP (ref 80–100)
MCV RBC AUTO: 96.5 FL — SIGNIFICANT CHANGE UP (ref 80–100)
MCV RBC AUTO: 96.9 FL — SIGNIFICANT CHANGE UP (ref 80–100)
MESOTHL CELL # FLD: 0 % — SIGNIFICANT CHANGE UP
METHOD TYPE: SIGNIFICANT CHANGE UP
MONOCYTES # BLD AUTO: 0.32 K/UL — SIGNIFICANT CHANGE UP (ref 0–0.9)
MONOCYTES # BLD AUTO: 0.62 K/UL — SIGNIFICANT CHANGE UP (ref 0–0.9)
MONOCYTES # BLD AUTO: 0.78 K/UL — SIGNIFICANT CHANGE UP (ref 0–0.9)
MONOCYTES # BLD AUTO: 0.79 K/UL — SIGNIFICANT CHANGE UP (ref 0–0.9)
MONOCYTES # BLD AUTO: 0.82 K/UL — SIGNIFICANT CHANGE UP (ref 0–0.9)
MONOCYTES # BLD AUTO: 0.83 K/UL — SIGNIFICANT CHANGE UP (ref 0–0.9)
MONOCYTES # BLD AUTO: 0.87 K/UL — SIGNIFICANT CHANGE UP (ref 0–0.9)
MONOCYTES # BLD AUTO: 0.9 K/UL — SIGNIFICANT CHANGE UP (ref 0–0.9)
MONOCYTES # BLD AUTO: 0.92 K/UL — HIGH (ref 0–0.9)
MONOCYTES # BLD AUTO: 0.94 K/UL — HIGH (ref 0–0.9)
MONOCYTES # BLD AUTO: 1.05 K/UL — HIGH (ref 0–0.9)
MONOCYTES # BLD AUTO: 1.16 K/UL — HIGH (ref 0–0.9)
MONOCYTES # BLD AUTO: 1.27 K/UL — HIGH (ref 0–0.9)
MONOCYTES # BLD AUTO: 1.45 K/UL — HIGH (ref 0–0.9)
MONOCYTES NFR BLD AUTO: 1.8 % — LOW (ref 2–14)
MONOCYTES NFR BLD AUTO: 10.5 % — SIGNIFICANT CHANGE UP (ref 2–14)
MONOCYTES NFR BLD AUTO: 11.7 % — SIGNIFICANT CHANGE UP (ref 2–14)
MONOCYTES NFR BLD AUTO: 11.9 % — SIGNIFICANT CHANGE UP (ref 2–14)
MONOCYTES NFR BLD AUTO: 5 % — SIGNIFICANT CHANGE UP (ref 2–14)
MONOCYTES NFR BLD AUTO: 5.3 % — SIGNIFICANT CHANGE UP (ref 2–14)
MONOCYTES NFR BLD AUTO: 5.8 % — SIGNIFICANT CHANGE UP (ref 2–14)
MONOCYTES NFR BLD AUTO: 6 % — SIGNIFICANT CHANGE UP (ref 2–14)
MONOCYTES NFR BLD AUTO: 6.2 % — SIGNIFICANT CHANGE UP (ref 2–14)
MONOCYTES NFR BLD AUTO: 8 % — SIGNIFICANT CHANGE UP (ref 2–14)
MONOCYTES NFR BLD AUTO: 9.2 % — SIGNIFICANT CHANGE UP (ref 2–14)
MONOCYTES NFR BLD AUTO: 9.5 % — SIGNIFICANT CHANGE UP (ref 2–14)
MONOCYTES NFR BLD AUTO: 9.7 % — SIGNIFICANT CHANGE UP (ref 2–14)
MONOCYTES NFR BLD AUTO: 9.8 % — SIGNIFICANT CHANGE UP (ref 2–14)
MONOS+MACROS # FLD: 6 % — SIGNIFICANT CHANGE UP
MUSK IGG SER IA-MCNC: <1 U/ML — SIGNIFICANT CHANGE UP
NEUTROPHILS # BLD AUTO: 10.55 K/UL — HIGH (ref 1.8–7.4)
NEUTROPHILS # BLD AUTO: 10.75 K/UL — HIGH (ref 1.8–7.4)
NEUTROPHILS # BLD AUTO: 11.13 K/UL — HIGH (ref 1.8–7.4)
NEUTROPHILS # BLD AUTO: 11.66 K/UL — HIGH (ref 1.8–7.4)
NEUTROPHILS # BLD AUTO: 15.17 K/UL — HIGH (ref 1.8–7.4)
NEUTROPHILS # BLD AUTO: 15.82 K/UL — HIGH (ref 1.8–7.4)
NEUTROPHILS # BLD AUTO: 15.91 K/UL — HIGH (ref 1.8–7.4)
NEUTROPHILS # BLD AUTO: 5.64 K/UL — SIGNIFICANT CHANGE UP (ref 1.8–7.4)
NEUTROPHILS # BLD AUTO: 6.34 K/UL — SIGNIFICANT CHANGE UP (ref 1.8–7.4)
NEUTROPHILS # BLD AUTO: 6.34 K/UL — SIGNIFICANT CHANGE UP (ref 1.8–7.4)
NEUTROPHILS # BLD AUTO: 6.58 K/UL — SIGNIFICANT CHANGE UP (ref 1.8–7.4)
NEUTROPHILS # BLD AUTO: 8.34 K/UL — HIGH (ref 1.8–7.4)
NEUTROPHILS # BLD AUTO: 8.39 K/UL — HIGH (ref 1.8–7.4)
NEUTROPHILS # BLD AUTO: 8.76 K/UL — HIGH (ref 1.8–7.4)
NEUTROPHILS NFR BLD AUTO: 67.5 % — SIGNIFICANT CHANGE UP (ref 43–77)
NEUTROPHILS NFR BLD AUTO: 71.2 % — SIGNIFICANT CHANGE UP (ref 43–77)
NEUTROPHILS NFR BLD AUTO: 72.2 % — SIGNIFICANT CHANGE UP (ref 43–77)
NEUTROPHILS NFR BLD AUTO: 72.4 % — SIGNIFICANT CHANGE UP (ref 43–77)
NEUTROPHILS NFR BLD AUTO: 74.1 % — SIGNIFICANT CHANGE UP (ref 43–77)
NEUTROPHILS NFR BLD AUTO: 74.3 % — SIGNIFICANT CHANGE UP (ref 43–77)
NEUTROPHILS NFR BLD AUTO: 79.2 % — HIGH (ref 43–77)
NEUTROPHILS NFR BLD AUTO: 81.8 % — HIGH (ref 43–77)
NEUTROPHILS NFR BLD AUTO: 83.4 % — HIGH (ref 43–77)
NEUTROPHILS NFR BLD AUTO: 85 % — HIGH (ref 43–77)
NEUTROPHILS NFR BLD AUTO: 87.5 % — HIGH (ref 43–77)
NEUTROPHILS NFR BLD AUTO: 87.8 % — HIGH (ref 43–77)
NEUTROPHILS NFR BLD AUTO: 88.8 % — HIGH (ref 43–77)
NEUTROPHILS NFR BLD AUTO: 91.3 % — HIGH (ref 43–77)
NIGHT BLUE STAIN TISS: SIGNIFICANT CHANGE UP
NITRITE UR-MCNC: NEGATIVE — SIGNIFICANT CHANGE UP
NITRITE UR-MCNC: NEGATIVE — SIGNIFICANT CHANGE UP
NITRITE UR-MCNC: POSITIVE
NRBC # BLD: 0 /100 WBCS — SIGNIFICANT CHANGE UP
NRBC # FLD: 0 K/UL — SIGNIFICANT CHANGE UP
ORGANISM # SPEC MICROSCOPIC CNT: SIGNIFICANT CHANGE UP
ORGANISM # SPEC MICROSCOPIC CNT: SIGNIFICANT CHANGE UP
OTHER CELLS FLD MANUAL: 20 % — SIGNIFICANT CHANGE UP
PCO2 BLDV: 43 MMHG — SIGNIFICANT CHANGE UP (ref 41–51)
PCO2 BLDV: 44 MMHG — SIGNIFICANT CHANGE UP (ref 41–51)
PCO2 BLDV: 47 MMHG — SIGNIFICANT CHANGE UP (ref 41–51)
PH BLDV: 7.41 — SIGNIFICANT CHANGE UP (ref 7.32–7.43)
PH BLDV: 7.43 — SIGNIFICANT CHANGE UP (ref 7.32–7.43)
PH BLDV: 7.48 — HIGH (ref 7.32–7.43)
PH UR: 6 — SIGNIFICANT CHANGE UP (ref 5–8)
PH UR: 7 — SIGNIFICANT CHANGE UP (ref 5–8)
PH UR: 7 — SIGNIFICANT CHANGE UP (ref 5–8)
PHOSPHATE SERPL-MCNC: 0.5 MG/DL — CRITICAL LOW (ref 2.5–4.5)
PHOSPHATE SERPL-MCNC: 1.3 MG/DL — LOW (ref 2.5–4.5)
PHOSPHATE SERPL-MCNC: 1.8 MG/DL — LOW (ref 2.5–4.5)
PHOSPHATE SERPL-MCNC: 2.1 MG/DL — LOW (ref 2.5–4.5)
PHOSPHATE SERPL-MCNC: 2.2 MG/DL — LOW (ref 2.5–4.5)
PHOSPHATE SERPL-MCNC: 2.2 MG/DL — LOW (ref 2.5–4.5)
PHOSPHATE SERPL-MCNC: 2.3 MG/DL — LOW (ref 2.5–4.5)
PHOSPHATE SERPL-MCNC: 2.4 MG/DL — LOW (ref 2.5–4.5)
PHOSPHATE SERPL-MCNC: 2.5 MG/DL — SIGNIFICANT CHANGE UP (ref 2.5–4.5)
PHOSPHATE SERPL-MCNC: 2.6 MG/DL — SIGNIFICANT CHANGE UP (ref 2.5–4.5)
PHOSPHATE SERPL-MCNC: 2.7 MG/DL — SIGNIFICANT CHANGE UP (ref 2.5–4.5)
PHOSPHATE SERPL-MCNC: 2.9 MG/DL — SIGNIFICANT CHANGE UP (ref 2.5–4.5)
PHOSPHATE SERPL-MCNC: 3 MG/DL — SIGNIFICANT CHANGE UP (ref 2.5–4.5)
PLATELET # BLD AUTO: 148 K/UL — LOW (ref 150–400)
PLATELET # BLD AUTO: 186 K/UL — SIGNIFICANT CHANGE UP (ref 150–400)
PLATELET # BLD AUTO: 191 K/UL — SIGNIFICANT CHANGE UP (ref 150–400)
PLATELET # BLD AUTO: 206 K/UL — SIGNIFICANT CHANGE UP (ref 150–400)
PLATELET # BLD AUTO: 210 K/UL — SIGNIFICANT CHANGE UP (ref 150–400)
PLATELET # BLD AUTO: 218 K/UL — SIGNIFICANT CHANGE UP (ref 150–400)
PLATELET # BLD AUTO: 221 K/UL — SIGNIFICANT CHANGE UP (ref 150–400)
PLATELET # BLD AUTO: 230 K/UL — SIGNIFICANT CHANGE UP (ref 150–400)
PLATELET # BLD AUTO: 231 K/UL — SIGNIFICANT CHANGE UP (ref 150–400)
PLATELET # BLD AUTO: 233 K/UL — SIGNIFICANT CHANGE UP (ref 150–400)
PLATELET # BLD AUTO: 248 K/UL — SIGNIFICANT CHANGE UP (ref 150–400)
PLATELET # BLD AUTO: 252 K/UL — SIGNIFICANT CHANGE UP (ref 150–400)
PLATELET # BLD AUTO: 262 K/UL — SIGNIFICANT CHANGE UP (ref 150–400)
PLATELET # BLD AUTO: 350 K/UL — SIGNIFICANT CHANGE UP (ref 150–400)
PO2 BLDV: 160 MMHG — HIGH (ref 35–40)
PO2 BLDV: 39 MMHG — SIGNIFICANT CHANGE UP (ref 35–40)
PO2 BLDV: 42 MMHG — HIGH (ref 35–40)
POTASSIUM BLDV-SCNC: 3.3 MMOL/L — LOW (ref 3.4–4.5)
POTASSIUM BLDV-SCNC: 3.3 MMOL/L — LOW (ref 3.4–4.5)
POTASSIUM BLDV-SCNC: 4.1 MMOL/L — SIGNIFICANT CHANGE UP (ref 3.4–4.5)
POTASSIUM SERPL-MCNC: 3.1 MMOL/L — LOW (ref 3.5–5.3)
POTASSIUM SERPL-MCNC: 3.4 MMOL/L — LOW (ref 3.5–5.3)
POTASSIUM SERPL-MCNC: 3.5 MMOL/L — SIGNIFICANT CHANGE UP (ref 3.5–5.3)
POTASSIUM SERPL-MCNC: 3.6 MMOL/L — SIGNIFICANT CHANGE UP (ref 3.5–5.3)
POTASSIUM SERPL-MCNC: 3.7 MMOL/L — SIGNIFICANT CHANGE UP (ref 3.5–5.3)
POTASSIUM SERPL-MCNC: 3.9 MMOL/L — SIGNIFICANT CHANGE UP (ref 3.5–5.3)
POTASSIUM SERPL-MCNC: 4.1 MMOL/L — SIGNIFICANT CHANGE UP (ref 3.5–5.3)
POTASSIUM SERPL-MCNC: 4.3 MMOL/L — SIGNIFICANT CHANGE UP (ref 3.5–5.3)
POTASSIUM SERPL-MCNC: 4.3 MMOL/L — SIGNIFICANT CHANGE UP (ref 3.5–5.3)
POTASSIUM SERPL-MCNC: 4.4 MMOL/L — SIGNIFICANT CHANGE UP (ref 3.5–5.3)
POTASSIUM SERPL-MCNC: 4.5 MMOL/L — SIGNIFICANT CHANGE UP (ref 3.5–5.3)
POTASSIUM SERPL-MCNC: 4.5 MMOL/L — SIGNIFICANT CHANGE UP (ref 3.5–5.3)
POTASSIUM SERPL-MCNC: 4.6 MMOL/L — SIGNIFICANT CHANGE UP (ref 3.5–5.3)
POTASSIUM SERPL-MCNC: 5.2 MMOL/L — SIGNIFICANT CHANGE UP (ref 3.5–5.3)
POTASSIUM SERPL-SCNC: 3.1 MMOL/L — LOW (ref 3.5–5.3)
POTASSIUM SERPL-SCNC: 3.4 MMOL/L — LOW (ref 3.5–5.3)
POTASSIUM SERPL-SCNC: 3.5 MMOL/L — SIGNIFICANT CHANGE UP (ref 3.5–5.3)
POTASSIUM SERPL-SCNC: 3.6 MMOL/L — SIGNIFICANT CHANGE UP (ref 3.5–5.3)
POTASSIUM SERPL-SCNC: 3.7 MMOL/L — SIGNIFICANT CHANGE UP (ref 3.5–5.3)
POTASSIUM SERPL-SCNC: 3.9 MMOL/L — SIGNIFICANT CHANGE UP (ref 3.5–5.3)
POTASSIUM SERPL-SCNC: 4.1 MMOL/L — SIGNIFICANT CHANGE UP (ref 3.5–5.3)
POTASSIUM SERPL-SCNC: 4.3 MMOL/L — SIGNIFICANT CHANGE UP (ref 3.5–5.3)
POTASSIUM SERPL-SCNC: 4.3 MMOL/L — SIGNIFICANT CHANGE UP (ref 3.5–5.3)
POTASSIUM SERPL-SCNC: 4.4 MMOL/L — SIGNIFICANT CHANGE UP (ref 3.5–5.3)
POTASSIUM SERPL-SCNC: 4.5 MMOL/L — SIGNIFICANT CHANGE UP (ref 3.5–5.3)
POTASSIUM SERPL-SCNC: 4.5 MMOL/L — SIGNIFICANT CHANGE UP (ref 3.5–5.3)
POTASSIUM SERPL-SCNC: 4.6 MMOL/L — SIGNIFICANT CHANGE UP (ref 3.5–5.3)
POTASSIUM SERPL-SCNC: 5.2 MMOL/L — SIGNIFICANT CHANGE UP (ref 3.5–5.3)
PROCALCITONIN SERPL-MCNC: 0.34 NG/ML — HIGH (ref 0.02–0.1)
PROT SERPL-MCNC: 5.2 G/DL — LOW (ref 6–8.3)
PROT SERPL-MCNC: 5.4 G/DL — LOW (ref 6–8.3)
PROT SERPL-MCNC: 5.5 G/DL — LOW (ref 6–8.3)
PROT SERPL-MCNC: 5.6 G/DL — LOW (ref 6–8.3)
PROT SERPL-MCNC: 5.7 G/DL — LOW (ref 6–8.3)
PROT SERPL-MCNC: 5.7 G/DL — LOW (ref 6–8.3)
PROT SERPL-MCNC: 5.8 G/DL — LOW (ref 6–8.3)
PROT SERPL-MCNC: 5.8 G/DL — LOW (ref 6–8.3)
PROT SERPL-MCNC: 6 G/DL — SIGNIFICANT CHANGE UP (ref 6–8.3)
PROT SERPL-MCNC: 6.2 G/DL — SIGNIFICANT CHANGE UP (ref 6–8.3)
PROT SERPL-MCNC: 6.3 G/DL — SIGNIFICANT CHANGE UP (ref 6–8.3)
PROT SERPL-MCNC: 6.3 G/DL — SIGNIFICANT CHANGE UP (ref 6–8.3)
PROT SERPL-MCNC: 6.6 G/DL — SIGNIFICANT CHANGE UP (ref 6–8.3)
PROT SERPL-MCNC: 7.6 G/DL — SIGNIFICANT CHANGE UP (ref 6–8.3)
PROT UR-MCNC: ABNORMAL
PROTHROM AB SERPL-ACNC: 13.7 SEC — HIGH (ref 10.6–13.6)
RAPID RVP RESULT: SIGNIFICANT CHANGE UP
RBC # BLD: 3.62 M/UL — LOW (ref 4.2–5.8)
RBC # BLD: 3.66 M/UL — LOW (ref 4.2–5.8)
RBC # BLD: 3.78 M/UL — LOW (ref 4.2–5.8)
RBC # BLD: 3.83 M/UL — LOW (ref 4.2–5.8)
RBC # BLD: 3.92 M/UL — LOW (ref 4.2–5.8)
RBC # BLD: 3.98 M/UL — LOW (ref 4.2–5.8)
RBC # BLD: 3.98 M/UL — LOW (ref 4.2–5.8)
RBC # BLD: 4.01 M/UL — LOW (ref 4.2–5.8)
RBC # BLD: 4.15 M/UL — LOW (ref 4.2–5.8)
RBC # BLD: 4.15 M/UL — LOW (ref 4.2–5.8)
RBC # BLD: 4.23 M/UL — SIGNIFICANT CHANGE UP (ref 4.2–5.8)
RBC # BLD: 4.3 M/UL — SIGNIFICANT CHANGE UP (ref 4.2–5.8)
RBC # BLD: 4.63 M/UL — SIGNIFICANT CHANGE UP (ref 4.2–5.8)
RBC # BLD: 4.88 M/UL — SIGNIFICANT CHANGE UP (ref 4.2–5.8)
RBC # FLD: 13.1 % — SIGNIFICANT CHANGE UP (ref 10.3–14.5)
RBC # FLD: 13.2 % — SIGNIFICANT CHANGE UP (ref 10.3–14.5)
RBC # FLD: 13.3 % — SIGNIFICANT CHANGE UP (ref 10.3–14.5)
RBC # FLD: 13.4 % — SIGNIFICANT CHANGE UP (ref 10.3–14.5)
RBC # FLD: 13.5 % — SIGNIFICANT CHANGE UP (ref 10.3–14.5)
RBC # FLD: 13.6 % — SIGNIFICANT CHANGE UP (ref 10.3–14.5)
RBC # FLD: 13.9 % — SIGNIFICANT CHANGE UP (ref 10.3–14.5)
RBC # FLD: 14 % — SIGNIFICANT CHANGE UP (ref 10.3–14.5)
RBC CASTS # UR COMP ASSIST: 5 /HPF — HIGH (ref 0–4)
RCV VOL RI: SIGNIFICANT CHANGE UP CELLS/UL (ref 0–5)
RSV RNA SPEC QL NAA+PROBE: SIGNIFICANT CHANGE UP
RV+EV RNA SPEC QL NAA+PROBE: SIGNIFICANT CHANGE UP
SAO2 % BLDV: 79.6 % — SIGNIFICANT CHANGE UP (ref 60–85)
SAO2 % BLDV: 81.7 % — SIGNIFICANT CHANGE UP (ref 60–85)
SAO2 % BLDV: 99.9 % — HIGH (ref 60–85)
SARS-COV-2 IGG+IGM SERPL QL IA: 0.11 INDEX — SIGNIFICANT CHANGE UP
SARS-COV-2 IGG+IGM SERPL QL IA: >250 U/ML — HIGH
SARS-COV-2 IGG+IGM SERPL QL IA: NEGATIVE — SIGNIFICANT CHANGE UP
SARS-COV-2 IGG+IGM SERPL QL IA: POSITIVE
SARS-COV-2 RNA SPEC QL NAA+PROBE: SIGNIFICANT CHANGE UP
SODIUM SERPL-SCNC: 133 MMOL/L — LOW (ref 135–145)
SODIUM SERPL-SCNC: 134 MMOL/L — LOW (ref 135–145)
SODIUM SERPL-SCNC: 134 MMOL/L — LOW (ref 135–145)
SODIUM SERPL-SCNC: 135 MMOL/L — SIGNIFICANT CHANGE UP (ref 135–145)
SODIUM SERPL-SCNC: 136 MMOL/L — SIGNIFICANT CHANGE UP (ref 135–145)
SODIUM SERPL-SCNC: 136 MMOL/L — SIGNIFICANT CHANGE UP (ref 135–145)
SODIUM SERPL-SCNC: 137 MMOL/L — SIGNIFICANT CHANGE UP (ref 135–145)
SODIUM SERPL-SCNC: 137 MMOL/L — SIGNIFICANT CHANGE UP (ref 135–145)
SODIUM SERPL-SCNC: 138 MMOL/L — SIGNIFICANT CHANGE UP (ref 135–145)
SODIUM SERPL-SCNC: 141 MMOL/L — SIGNIFICANT CHANGE UP (ref 135–145)
SODIUM UR-SCNC: <20 MMOL/L — SIGNIFICANT CHANGE UP
SP GR SPEC: 1.01 — SIGNIFICANT CHANGE UP (ref 1.01–1.02)
SP GR SPEC: 1.02 — SIGNIFICANT CHANGE UP (ref 1.01–1.02)
SP GR SPEC: 1.02 — SIGNIFICANT CHANGE UP (ref 1.01–1.02)
SPECIMEN SOURCE FLD: SIGNIFICANT CHANGE UP
SPECIMEN SOURCE: SIGNIFICANT CHANGE UP
TOTAL NUCLEATED CELL COUNT, BODY FLUID: SIGNIFICANT CHANGE UP CELLS/UL (ref 0–5)
TSH SERPL-MCNC: 4.63 UIU/ML — HIGH (ref 0.27–4.2)
TUBE TYPE: SIGNIFICANT CHANGE UP
UROBILINOGEN FLD QL: SIGNIFICANT CHANGE UP
UUN UR-MCNC: 840 MG/DL — SIGNIFICANT CHANGE UP
VIT B1 SERPL-MCNC: 185.6 NMOL/L — SIGNIFICANT CHANGE UP (ref 66.5–200)
VIT B12 SERPL-MCNC: 1134 PG/ML — HIGH (ref 200–900)
WBC # BLD: 10.6 K/UL — HIGH (ref 3.8–10.5)
WBC # BLD: 11.79 K/UL — HIGH (ref 3.8–10.5)
WBC # BLD: 12.37 K/UL — HIGH (ref 3.8–10.5)
WBC # BLD: 12.65 K/UL — HIGH (ref 3.8–10.5)
WBC # BLD: 13.1 K/UL — HIGH (ref 3.8–10.5)
WBC # BLD: 13.13 K/UL — HIGH (ref 3.8–10.5)
WBC # BLD: 13.26 K/UL — HIGH (ref 3.8–10.5)
WBC # BLD: 17.09 K/UL — HIGH (ref 3.8–10.5)
WBC # BLD: 17.33 K/UL — HIGH (ref 3.8–10.5)
WBC # BLD: 18 K/UL — HIGH (ref 3.8–10.5)
WBC # BLD: 7.92 K/UL — SIGNIFICANT CHANGE UP (ref 3.8–10.5)
WBC # BLD: 8.76 K/UL — SIGNIFICANT CHANGE UP (ref 3.8–10.5)
WBC # BLD: 8.78 K/UL — SIGNIFICANT CHANGE UP (ref 3.8–10.5)
WBC # BLD: 8.89 K/UL — SIGNIFICANT CHANGE UP (ref 3.8–10.5)
WBC # FLD AUTO: 10.6 K/UL — HIGH (ref 3.8–10.5)
WBC # FLD AUTO: 11.79 K/UL — HIGH (ref 3.8–10.5)
WBC # FLD AUTO: 12.37 K/UL — HIGH (ref 3.8–10.5)
WBC # FLD AUTO: 12.65 K/UL — HIGH (ref 3.8–10.5)
WBC # FLD AUTO: 13.1 K/UL — HIGH (ref 3.8–10.5)
WBC # FLD AUTO: 13.13 K/UL — HIGH (ref 3.8–10.5)
WBC # FLD AUTO: 13.26 K/UL — HIGH (ref 3.8–10.5)
WBC # FLD AUTO: 17.09 K/UL — HIGH (ref 3.8–10.5)
WBC # FLD AUTO: 17.33 K/UL — HIGH (ref 3.8–10.5)
WBC # FLD AUTO: 18 K/UL — HIGH (ref 3.8–10.5)
WBC # FLD AUTO: 7.92 K/UL — SIGNIFICANT CHANGE UP (ref 3.8–10.5)
WBC # FLD AUTO: 8.76 K/UL — SIGNIFICANT CHANGE UP (ref 3.8–10.5)
WBC # FLD AUTO: 8.78 K/UL — SIGNIFICANT CHANGE UP (ref 3.8–10.5)
WBC # FLD AUTO: 8.89 K/UL — SIGNIFICANT CHANGE UP (ref 3.8–10.5)
WBC UR QL: 10 /HPF — HIGH (ref 0–5)

## 2021-01-01 PROCEDURE — 99291 CRITICAL CARE FIRST HOUR: CPT

## 2021-01-01 PROCEDURE — 36556 INSERT NON-TUNNEL CV CATH: CPT

## 2021-01-01 PROCEDURE — 76937 US GUIDE VASCULAR ACCESS: CPT | Mod: 26,59

## 2021-01-01 PROCEDURE — 71045 X-RAY EXAM CHEST 1 VIEW: CPT | Mod: 26

## 2021-01-01 PROCEDURE — 76937 US GUIDE VASCULAR ACCESS: CPT | Mod: 26

## 2021-01-01 PROCEDURE — 70450 CT HEAD/BRAIN W/O DYE: CPT | Mod: 26

## 2021-01-01 PROCEDURE — 71045 X-RAY EXAM CHEST 1 VIEW: CPT | Mod: 26,77

## 2021-01-01 PROCEDURE — 99223 1ST HOSP IP/OBS HIGH 75: CPT

## 2021-01-01 PROCEDURE — 76604 US EXAM CHEST: CPT | Mod: 26,GC

## 2021-01-01 PROCEDURE — 36600 WITHDRAWAL OF ARTERIAL BLOOD: CPT

## 2021-01-01 PROCEDURE — 43753 TX GASTRO INTUB W/ASP: CPT | Mod: 59,GC

## 2021-01-01 PROCEDURE — 36573 INSJ PICC RS&I 5 YR+: CPT

## 2021-01-01 PROCEDURE — 31500 INSERT EMERGENCY AIRWAY: CPT

## 2021-01-01 PROCEDURE — 99233 SBSQ HOSP IP/OBS HIGH 50: CPT

## 2021-01-01 PROCEDURE — 31500 INSERT EMERGENCY AIRWAY: CPT | Mod: GC

## 2021-01-01 PROCEDURE — 99291 CRITICAL CARE FIRST HOUR: CPT | Mod: 25

## 2021-01-01 PROCEDURE — 43753 TX GASTRO INTUB W/ASP: CPT

## 2021-01-01 PROCEDURE — 71045 X-RAY EXAM CHEST 1 VIEW: CPT | Mod: 26,76

## 2021-01-01 PROCEDURE — 36000 PLACE NEEDLE IN VEIN: CPT | Mod: 59

## 2021-01-01 PROCEDURE — 99497 ADVNCD CARE PLAN 30 MIN: CPT | Mod: 25

## 2021-01-01 PROCEDURE — 36569 INSJ PICC 5 YR+ W/O IMAGING: CPT

## 2021-01-01 PROCEDURE — 31624 DX BRONCHOSCOPE/LAVAGE: CPT | Mod: GC

## 2021-01-01 RX ORDER — MIDAZOLAM HYDROCHLORIDE 1 MG/ML
0.02 INJECTION, SOLUTION INTRAMUSCULAR; INTRAVENOUS
Qty: 100 | Refills: 0 | Status: DISCONTINUED | OUTPATIENT
Start: 2021-01-01 | End: 2021-01-01

## 2021-01-01 RX ORDER — FENTANYL CITRATE 50 UG/ML
2 INJECTION INTRAVENOUS
Qty: 2500 | Refills: 0 | Status: DISCONTINUED | OUTPATIENT
Start: 2021-01-01 | End: 2021-01-01

## 2021-01-01 RX ORDER — PROPOFOL 10 MG/ML
50 INJECTION, EMULSION INTRAVENOUS
Qty: 500 | Refills: 0 | Status: DISCONTINUED | OUTPATIENT
Start: 2021-01-01 | End: 2021-01-01

## 2021-01-01 RX ORDER — LEVETIRACETAM 250 MG/1
750 TABLET, FILM COATED ORAL EVERY 12 HOURS
Refills: 0 | Status: DISCONTINUED | OUTPATIENT
Start: 2021-01-01 | End: 2021-01-01

## 2021-01-01 RX ORDER — DONEPEZIL HYDROCHLORIDE 10 MG/1
0 TABLET, FILM COATED ORAL
Qty: 0 | Refills: 0 | DISCHARGE

## 2021-01-01 RX ORDER — ETOMIDATE 2 MG/ML
20 INJECTION INTRAVENOUS ONCE
Refills: 0 | Status: COMPLETED | OUTPATIENT
Start: 2021-01-01 | End: 2021-01-01

## 2021-01-01 RX ORDER — MIDAZOLAM HYDROCHLORIDE 1 MG/ML
4 INJECTION, SOLUTION INTRAMUSCULAR; INTRAVENOUS ONCE
Refills: 0 | Status: DISCONTINUED | OUTPATIENT
Start: 2021-01-01 | End: 2021-01-01

## 2021-01-01 RX ORDER — POTASSIUM PHOSPHATE, MONOBASIC POTASSIUM PHOSPHATE, DIBASIC 236; 224 MG/ML; MG/ML
30 INJECTION, SOLUTION INTRAVENOUS ONCE
Refills: 0 | Status: COMPLETED | OUTPATIENT
Start: 2021-01-01 | End: 2021-01-01

## 2021-01-01 RX ORDER — HEPARIN SODIUM 5000 [USP'U]/ML
5000 INJECTION INTRAVENOUS; SUBCUTANEOUS EVERY 8 HOURS
Refills: 0 | Status: DISCONTINUED | OUTPATIENT
Start: 2021-01-01 | End: 2021-01-01

## 2021-01-01 RX ORDER — DIAZEPAM 5 MG
2 TABLET ORAL ONCE
Refills: 0 | Status: DISCONTINUED | OUTPATIENT
Start: 2021-01-01 | End: 2021-01-01

## 2021-01-01 RX ORDER — LEVETIRACETAM 250 MG/1
750 TABLET, FILM COATED ORAL
Refills: 0 | Status: DISCONTINUED | OUTPATIENT
Start: 2021-01-01 | End: 2021-01-01

## 2021-01-01 RX ORDER — POTASSIUM PHOSPHATE, MONOBASIC POTASSIUM PHOSPHATE, DIBASIC 236; 224 MG/ML; MG/ML
15 INJECTION, SOLUTION INTRAVENOUS ONCE
Refills: 0 | Status: COMPLETED | OUTPATIENT
Start: 2021-01-01 | End: 2021-01-01

## 2021-01-01 RX ORDER — NOREPINEPHRINE BITARTRATE/D5W 8 MG/250ML
0.08 PLASTIC BAG, INJECTION (ML) INTRAVENOUS
Qty: 8 | Refills: 0 | Status: DISCONTINUED | OUTPATIENT
Start: 2021-01-01 | End: 2021-01-01

## 2021-01-01 RX ORDER — LATANOPROST 0.05 MG/ML
0 SOLUTION/ DROPS OPHTHALMIC; TOPICAL
Qty: 0 | Refills: 0 | DISCHARGE

## 2021-01-01 RX ORDER — PIPERACILLIN AND TAZOBACTAM 4; .5 G/20ML; G/20ML
3.38 INJECTION, POWDER, LYOPHILIZED, FOR SOLUTION INTRAVENOUS EVERY 8 HOURS
Refills: 0 | Status: DISCONTINUED | OUTPATIENT
Start: 2021-01-01 | End: 2021-01-01

## 2021-01-01 RX ORDER — NOREPINEPHRINE BITARTRATE/D5W 8 MG/250ML
0.05 PLASTIC BAG, INJECTION (ML) INTRAVENOUS
Qty: 8 | Refills: 0 | Status: DISCONTINUED | OUTPATIENT
Start: 2021-01-01 | End: 2021-01-01

## 2021-01-01 RX ORDER — ACETAMINOPHEN 500 MG
650 TABLET ORAL ONCE
Refills: 0 | Status: COMPLETED | OUTPATIENT
Start: 2021-01-01 | End: 2021-01-01

## 2021-01-01 RX ORDER — CHLORHEXIDINE GLUCONATE 213 G/1000ML
15 SOLUTION TOPICAL EVERY 12 HOURS
Refills: 0 | Status: DISCONTINUED | OUTPATIENT
Start: 2021-01-01 | End: 2021-01-01

## 2021-01-01 RX ORDER — SUCCINYLCHOLINE CHLORIDE 100 MG/5ML
100 SYRINGE (ML) INTRAVENOUS ONCE
Refills: 0 | Status: COMPLETED | OUTPATIENT
Start: 2021-01-01 | End: 2021-01-01

## 2021-01-01 RX ORDER — MIDAZOLAM HYDROCHLORIDE 1 MG/ML
2 INJECTION, SOLUTION INTRAMUSCULAR; INTRAVENOUS ONCE
Refills: 0 | Status: DISCONTINUED | OUTPATIENT
Start: 2021-01-01 | End: 2021-01-01

## 2021-01-01 RX ORDER — CEFTRIAXONE 500 MG/1
1000 INJECTION, POWDER, FOR SOLUTION INTRAMUSCULAR; INTRAVENOUS EVERY 24 HOURS
Refills: 0 | Status: DISCONTINUED | OUTPATIENT
Start: 2021-01-01 | End: 2021-01-01

## 2021-01-01 RX ORDER — DIAZEPAM 5 MG
5 TABLET ORAL ONCE
Refills: 0 | Status: DISCONTINUED | OUTPATIENT
Start: 2021-01-01 | End: 2021-01-01

## 2021-01-01 RX ORDER — FENTANYL CITRATE 50 UG/ML
100 INJECTION INTRAVENOUS ONCE
Refills: 0 | Status: DISCONTINUED | OUTPATIENT
Start: 2021-01-01 | End: 2021-01-01

## 2021-01-01 RX ORDER — OXCARBAZEPINE 300 MG/1
300 TABLET, FILM COATED ORAL
Refills: 0 | Status: DISCONTINUED | OUTPATIENT
Start: 2021-01-01 | End: 2021-01-01

## 2021-01-01 RX ORDER — SODIUM CHLORIDE 9 MG/ML
500 INJECTION INTRAMUSCULAR; INTRAVENOUS; SUBCUTANEOUS ONCE
Refills: 0 | Status: COMPLETED | OUTPATIENT
Start: 2021-01-01 | End: 2021-01-01

## 2021-01-01 RX ORDER — MORPHINE SULFATE 50 MG/1
2 CAPSULE, EXTENDED RELEASE ORAL ONCE
Refills: 0 | Status: DISCONTINUED | OUTPATIENT
Start: 2021-01-01 | End: 2021-01-01

## 2021-01-01 RX ORDER — SIMVASTATIN 20 MG/1
0 TABLET, FILM COATED ORAL
Qty: 0 | Refills: 0 | DISCHARGE

## 2021-01-01 RX ORDER — RILUZOLE 50 MG/1
0 TABLET ORAL
Qty: 0 | Refills: 3 | DISCHARGE

## 2021-01-01 RX ORDER — POTASSIUM CHLORIDE 20 MEQ
10 PACKET (EA) ORAL
Refills: 0 | Status: COMPLETED | OUTPATIENT
Start: 2021-01-01 | End: 2021-01-01

## 2021-01-01 RX ORDER — OXCARBAZEPINE 300 MG/1
1 TABLET, FILM COATED ORAL
Qty: 0 | Refills: 0 | DISCHARGE

## 2021-01-01 RX ORDER — ENOXAPARIN SODIUM 100 MG/ML
40 INJECTION SUBCUTANEOUS DAILY
Refills: 0 | Status: DISCONTINUED | OUTPATIENT
Start: 2021-01-01 | End: 2021-01-01

## 2021-01-01 RX ORDER — MIDAZOLAM HYDROCHLORIDE 1 MG/ML
6 INJECTION, SOLUTION INTRAMUSCULAR; INTRAVENOUS ONCE
Refills: 0 | Status: DISCONTINUED | OUTPATIENT
Start: 2021-01-01 | End: 2021-01-01

## 2021-01-01 RX ORDER — PROPOFOL 10 MG/ML
10 INJECTION, EMULSION INTRAVENOUS
Qty: 1000 | Refills: 0 | Status: DISCONTINUED | OUTPATIENT
Start: 2021-01-01 | End: 2021-01-01

## 2021-01-01 RX ORDER — MORPHINE SULFATE 50 MG/1
4 CAPSULE, EXTENDED RELEASE ORAL
Qty: 100 | Refills: 0 | Status: DISCONTINUED | OUTPATIENT
Start: 2021-01-01 | End: 2021-01-01

## 2021-01-01 RX ORDER — ATORVASTATIN CALCIUM 80 MG/1
40 TABLET, FILM COATED ORAL AT BEDTIME
Refills: 0 | Status: DISCONTINUED | OUTPATIENT
Start: 2021-01-01 | End: 2021-01-01

## 2021-01-01 RX ORDER — KETAMINE HYDROCHLORIDE 100 MG/ML
0.25 INJECTION INTRAMUSCULAR; INTRAVENOUS
Qty: 1000 | Refills: 0 | Status: DISCONTINUED | OUTPATIENT
Start: 2021-01-01 | End: 2021-01-01

## 2021-01-01 RX ORDER — CHLORHEXIDINE GLUCONATE 213 G/1000ML
1 SOLUTION TOPICAL
Refills: 0 | Status: DISCONTINUED | OUTPATIENT
Start: 2021-01-01 | End: 2021-01-01

## 2021-01-01 RX ORDER — MORPHINE SULFATE 50 MG/1
4 CAPSULE, EXTENDED RELEASE ORAL
Refills: 0 | Status: DISCONTINUED | OUTPATIENT
Start: 2021-01-01 | End: 2021-01-01

## 2021-01-01 RX ORDER — LATANOPROST 0.05 MG/ML
1 SOLUTION/ DROPS OPHTHALMIC; TOPICAL AT BEDTIME
Refills: 0 | Status: DISCONTINUED | OUTPATIENT
Start: 2021-01-01 | End: 2021-01-01

## 2021-01-01 RX ORDER — RILUZOLE 50 MG/1
50 TABLET ORAL
Refills: 0 | Status: DISCONTINUED | OUTPATIENT
Start: 2021-01-01 | End: 2021-01-01

## 2021-01-01 RX ORDER — MIDODRINE HYDROCHLORIDE 2.5 MG/1
5 TABLET ORAL ONCE
Refills: 0 | Status: COMPLETED | OUTPATIENT
Start: 2021-01-01 | End: 2021-01-01

## 2021-01-01 RX ORDER — ALBUTEROL 90 UG/1
2 AEROSOL, METERED ORAL ONCE
Refills: 0 | Status: COMPLETED | OUTPATIENT
Start: 2021-01-01 | End: 2021-01-01

## 2021-01-01 RX ORDER — PIPERACILLIN AND TAZOBACTAM 4; .5 G/20ML; G/20ML
3.38 INJECTION, POWDER, LYOPHILIZED, FOR SOLUTION INTRAVENOUS ONCE
Refills: 0 | Status: COMPLETED | OUTPATIENT
Start: 2021-01-01 | End: 2021-01-01

## 2021-01-01 RX ORDER — LEVETIRACETAM 250 MG/1
1 TABLET, FILM COATED ORAL
Qty: 0 | Refills: 0 | DISCHARGE

## 2021-01-01 RX ORDER — ROBINUL 0.2 MG/ML
0.4 INJECTION INTRAMUSCULAR; INTRAVENOUS EVERY 6 HOURS
Refills: 0 | Status: DISCONTINUED | OUTPATIENT
Start: 2021-01-01 | End: 2021-01-01

## 2021-01-01 RX ORDER — ACETAMINOPHEN 500 MG
650 TABLET ORAL EVERY 6 HOURS
Refills: 0 | Status: DISCONTINUED | OUTPATIENT
Start: 2021-01-01 | End: 2021-01-01

## 2021-01-01 RX ORDER — IPRATROPIUM/ALBUTEROL SULFATE 18-103MCG
3 AEROSOL WITH ADAPTER (GRAM) INHALATION ONCE
Refills: 0 | Status: COMPLETED | OUTPATIENT
Start: 2021-01-01 | End: 2021-01-01

## 2021-01-01 RX ORDER — FENTANYL CITRATE 50 UG/ML
0.5 INJECTION INTRAVENOUS
Qty: 2500 | Refills: 0 | Status: DISCONTINUED | OUTPATIENT
Start: 2021-01-01 | End: 2021-01-01

## 2021-01-01 RX ORDER — SODIUM CHLORIDE 9 MG/ML
1000 INJECTION INTRAMUSCULAR; INTRAVENOUS; SUBCUTANEOUS
Refills: 0 | Status: DISCONTINUED | OUTPATIENT
Start: 2021-01-01 | End: 2021-01-01

## 2021-01-01 RX ORDER — SODIUM,POTASSIUM PHOSPHATES 278-250MG
1 POWDER IN PACKET (EA) ORAL THREE TIMES A DAY
Refills: 0 | Status: COMPLETED | OUTPATIENT
Start: 2021-01-01 | End: 2021-01-01

## 2021-01-01 RX ORDER — PIPERACILLIN AND TAZOBACTAM 4; .5 G/20ML; G/20ML
3.38 INJECTION, POWDER, LYOPHILIZED, FOR SOLUTION INTRAVENOUS EVERY 8 HOURS
Refills: 0 | Status: COMPLETED | OUTPATIENT
Start: 2021-01-01 | End: 2021-01-01

## 2021-01-01 RX ORDER — PROPOFOL 10 MG/ML
50 INJECTION, EMULSION INTRAVENOUS
Qty: 1000 | Refills: 0 | Status: DISCONTINUED | OUTPATIENT
Start: 2021-01-01 | End: 2021-01-01

## 2021-01-01 RX ORDER — DEXMEDETOMIDINE HYDROCHLORIDE IN 0.9% SODIUM CHLORIDE 4 UG/ML
0.2 INJECTION INTRAVENOUS
Qty: 400 | Refills: 0 | Status: DISCONTINUED | OUTPATIENT
Start: 2021-01-01 | End: 2021-01-01

## 2021-01-01 RX ORDER — DONEPEZIL HYDROCHLORIDE 10 MG/1
10 TABLET, FILM COATED ORAL AT BEDTIME
Refills: 0 | Status: DISCONTINUED | OUTPATIENT
Start: 2021-01-01 | End: 2021-01-01

## 2021-01-01 RX ORDER — FENTANYL CITRATE 50 UG/ML
0.5 INJECTION INTRAVENOUS
Qty: 5000 | Refills: 0 | Status: DISCONTINUED | OUTPATIENT
Start: 2021-01-01 | End: 2021-01-01

## 2021-01-01 RX ADMIN — Medication 6.68 MICROGRAM(S)/KG/MIN: at 07:40

## 2021-01-01 RX ADMIN — OXCARBAZEPINE 300 MILLIGRAM(S): 300 TABLET, FILM COATED ORAL at 17:10

## 2021-01-01 RX ADMIN — PIPERACILLIN AND TAZOBACTAM 25 GRAM(S): 4; .5 INJECTION, POWDER, LYOPHILIZED, FOR SOLUTION INTRAVENOUS at 20:56

## 2021-01-01 RX ADMIN — CHLORHEXIDINE GLUCONATE 1 APPLICATION(S): 213 SOLUTION TOPICAL at 06:58

## 2021-01-01 RX ADMIN — Medication 6.68 MICROGRAM(S)/KG/MIN: at 09:51

## 2021-01-01 RX ADMIN — LATANOPROST 1 DROP(S): 0.05 SOLUTION/ DROPS OPHTHALMIC; TOPICAL at 00:13

## 2021-01-01 RX ADMIN — DONEPEZIL HYDROCHLORIDE 10 MILLIGRAM(S): 10 TABLET, FILM COATED ORAL at 22:12

## 2021-01-01 RX ADMIN — DONEPEZIL HYDROCHLORIDE 10 MILLIGRAM(S): 10 TABLET, FILM COATED ORAL at 23:03

## 2021-01-01 RX ADMIN — DEXMEDETOMIDINE HYDROCHLORIDE IN 0.9% SODIUM CHLORIDE 3.57 MICROGRAM(S)/KG/HR: 4 INJECTION INTRAVENOUS at 19:23

## 2021-01-01 RX ADMIN — Medication 9 MICROGRAM(S)/KG/MIN: at 22:03

## 2021-01-01 RX ADMIN — OXCARBAZEPINE 300 MILLIGRAM(S): 300 TABLET, FILM COATED ORAL at 20:48

## 2021-01-01 RX ADMIN — Medication 100 MILLIEQUIVALENT(S): at 00:48

## 2021-01-01 RX ADMIN — PIPERACILLIN AND TAZOBACTAM 25 GRAM(S): 4; .5 INJECTION, POWDER, LYOPHILIZED, FOR SOLUTION INTRAVENOUS at 04:00

## 2021-01-01 RX ADMIN — LEVETIRACETAM 750 MILLIGRAM(S): 250 TABLET, FILM COATED ORAL at 18:17

## 2021-01-01 RX ADMIN — Medication 650 MILLIGRAM(S): at 18:58

## 2021-01-01 RX ADMIN — PIPERACILLIN AND TAZOBACTAM 25 GRAM(S): 4; .5 INJECTION, POWDER, LYOPHILIZED, FOR SOLUTION INTRAVENOUS at 09:28

## 2021-01-01 RX ADMIN — LEVETIRACETAM 750 MILLIGRAM(S): 250 TABLET, FILM COATED ORAL at 06:08

## 2021-01-01 RX ADMIN — HEPARIN SODIUM 5000 UNIT(S): 5000 INJECTION INTRAVENOUS; SUBCUTANEOUS at 05:08

## 2021-01-01 RX ADMIN — LEVETIRACETAM 750 MILLIGRAM(S): 250 TABLET, FILM COATED ORAL at 05:42

## 2021-01-01 RX ADMIN — ENOXAPARIN SODIUM 40 MILLIGRAM(S): 100 INJECTION SUBCUTANEOUS at 12:42

## 2021-01-01 RX ADMIN — HEPARIN SODIUM 5000 UNIT(S): 5000 INJECTION INTRAVENOUS; SUBCUTANEOUS at 14:59

## 2021-01-01 RX ADMIN — Medication 100 MILLIGRAM(S): at 11:07

## 2021-01-01 RX ADMIN — CHLORHEXIDINE GLUCONATE 1 APPLICATION(S): 213 SOLUTION TOPICAL at 07:01

## 2021-01-01 RX ADMIN — DEXMEDETOMIDINE HYDROCHLORIDE IN 0.9% SODIUM CHLORIDE 3.57 MICROGRAM(S)/KG/HR: 4 INJECTION INTRAVENOUS at 07:42

## 2021-01-01 RX ADMIN — RILUZOLE 50 MILLIGRAM(S): 50 TABLET ORAL at 10:19

## 2021-01-01 RX ADMIN — FENTANYL CITRATE 3 MICROGRAM(S)/KG/HR: 50 INJECTION INTRAVENOUS at 11:04

## 2021-01-01 RX ADMIN — RILUZOLE 50 MILLIGRAM(S): 50 TABLET ORAL at 08:18

## 2021-01-01 RX ADMIN — OXCARBAZEPINE 300 MILLIGRAM(S): 300 TABLET, FILM COATED ORAL at 05:11

## 2021-01-01 RX ADMIN — HEPARIN SODIUM 5000 UNIT(S): 5000 INJECTION INTRAVENOUS; SUBCUTANEOUS at 14:17

## 2021-01-01 RX ADMIN — Medication 1 PACKET(S): at 05:09

## 2021-01-01 RX ADMIN — PROPOFOL 4.28 MICROGRAM(S)/KG/MIN: 10 INJECTION, EMULSION INTRAVENOUS at 09:51

## 2021-01-01 RX ADMIN — ATORVASTATIN CALCIUM 40 MILLIGRAM(S): 80 TABLET, FILM COATED ORAL at 21:03

## 2021-01-01 RX ADMIN — FENTANYL CITRATE 100 MICROGRAM(S): 50 INJECTION INTRAVENOUS at 15:19

## 2021-01-01 RX ADMIN — POTASSIUM PHOSPHATE, MONOBASIC POTASSIUM PHOSPHATE, DIBASIC 83.33 MILLIMOLE(S): 236; 224 INJECTION, SOLUTION INTRAVENOUS at 11:53

## 2021-01-01 RX ADMIN — LEVETIRACETAM 750 MILLIGRAM(S): 250 TABLET, FILM COATED ORAL at 05:13

## 2021-01-01 RX ADMIN — PIPERACILLIN AND TAZOBACTAM 25 GRAM(S): 4; .5 INJECTION, POWDER, LYOPHILIZED, FOR SOLUTION INTRAVENOUS at 10:00

## 2021-01-01 RX ADMIN — PIPERACILLIN AND TAZOBACTAM 25 GRAM(S): 4; .5 INJECTION, POWDER, LYOPHILIZED, FOR SOLUTION INTRAVENOUS at 17:52

## 2021-01-01 RX ADMIN — CHLORHEXIDINE GLUCONATE 15 MILLILITER(S): 213 SOLUTION TOPICAL at 06:36

## 2021-01-01 RX ADMIN — OXCARBAZEPINE 300 MILLIGRAM(S): 300 TABLET, FILM COATED ORAL at 18:10

## 2021-01-01 RX ADMIN — FENTANYL CITRATE 3 MICROGRAM(S)/KG/HR: 50 INJECTION INTRAVENOUS at 07:34

## 2021-01-01 RX ADMIN — Medication 6.68 MICROGRAM(S)/KG/MIN: at 09:31

## 2021-01-01 RX ADMIN — MIDAZOLAM HYDROCHLORIDE 2 MILLIGRAM(S): 1 INJECTION, SOLUTION INTRAMUSCULAR; INTRAVENOUS at 02:00

## 2021-01-01 RX ADMIN — PIPERACILLIN AND TAZOBACTAM 25 GRAM(S): 4; .5 INJECTION, POWDER, LYOPHILIZED, FOR SOLUTION INTRAVENOUS at 23:57

## 2021-01-01 RX ADMIN — PROPOFOL 4.28 MICROGRAM(S)/KG/MIN: 10 INJECTION, EMULSION INTRAVENOUS at 17:32

## 2021-01-01 RX ADMIN — HEPARIN SODIUM 5000 UNIT(S): 5000 INJECTION INTRAVENOUS; SUBCUTANEOUS at 13:19

## 2021-01-01 RX ADMIN — FENTANYL CITRATE 3 MICROGRAM(S)/KG/HR: 50 INJECTION INTRAVENOUS at 12:31

## 2021-01-01 RX ADMIN — PIPERACILLIN AND TAZOBACTAM 25 GRAM(S): 4; .5 INJECTION, POWDER, LYOPHILIZED, FOR SOLUTION INTRAVENOUS at 20:26

## 2021-01-01 RX ADMIN — LATANOPROST 1 DROP(S): 0.05 SOLUTION/ DROPS OPHTHALMIC; TOPICAL at 22:25

## 2021-01-01 RX ADMIN — MORPHINE SULFATE 2 MILLIGRAM(S): 50 CAPSULE, EXTENDED RELEASE ORAL at 14:51

## 2021-01-01 RX ADMIN — ATORVASTATIN CALCIUM 40 MILLIGRAM(S): 80 TABLET, FILM COATED ORAL at 23:48

## 2021-01-01 RX ADMIN — FENTANYL CITRATE 100 MICROGRAM(S): 50 INJECTION INTRAVENOUS at 17:01

## 2021-01-01 RX ADMIN — OXCARBAZEPINE 300 MILLIGRAM(S): 300 TABLET, FILM COATED ORAL at 17:25

## 2021-01-01 RX ADMIN — PIPERACILLIN AND TAZOBACTAM 25 GRAM(S): 4; .5 INJECTION, POWDER, LYOPHILIZED, FOR SOLUTION INTRAVENOUS at 07:33

## 2021-01-01 RX ADMIN — OXCARBAZEPINE 300 MILLIGRAM(S): 300 TABLET, FILM COATED ORAL at 05:40

## 2021-01-01 RX ADMIN — CHLORHEXIDINE GLUCONATE 1 APPLICATION(S): 213 SOLUTION TOPICAL at 06:02

## 2021-01-01 RX ADMIN — MIDAZOLAM HYDROCHLORIDE 1.43 MG/KG/HR: 1 INJECTION, SOLUTION INTRAMUSCULAR; INTRAVENOUS at 12:10

## 2021-01-01 RX ADMIN — POTASSIUM PHOSPHATE, MONOBASIC POTASSIUM PHOSPHATE, DIBASIC 83.33 MILLIMOLE(S): 236; 224 INJECTION, SOLUTION INTRAVENOUS at 13:19

## 2021-01-01 RX ADMIN — Medication 650 MILLIGRAM(S): at 12:44

## 2021-01-01 RX ADMIN — MIDAZOLAM HYDROCHLORIDE 2 MILLIGRAM(S): 1 INJECTION, SOLUTION INTRAMUSCULAR; INTRAVENOUS at 00:20

## 2021-01-01 RX ADMIN — Medication 5 MILLIGRAM(S): at 22:42

## 2021-01-01 RX ADMIN — CHLORHEXIDINE GLUCONATE 15 MILLILITER(S): 213 SOLUTION TOPICAL at 05:40

## 2021-01-01 RX ADMIN — PROPOFOL 4.28 MICROGRAM(S)/KG/MIN: 10 INJECTION, EMULSION INTRAVENOUS at 06:20

## 2021-01-01 RX ADMIN — CHLORHEXIDINE GLUCONATE 1 APPLICATION(S): 213 SOLUTION TOPICAL at 06:37

## 2021-01-01 RX ADMIN — CHLORHEXIDINE GLUCONATE 15 MILLILITER(S): 213 SOLUTION TOPICAL at 06:37

## 2021-01-01 RX ADMIN — PIPERACILLIN AND TAZOBACTAM 25 GRAM(S): 4; .5 INJECTION, POWDER, LYOPHILIZED, FOR SOLUTION INTRAVENOUS at 02:27

## 2021-01-01 RX ADMIN — ENOXAPARIN SODIUM 40 MILLIGRAM(S): 100 INJECTION SUBCUTANEOUS at 12:10

## 2021-01-01 RX ADMIN — DONEPEZIL HYDROCHLORIDE 10 MILLIGRAM(S): 10 TABLET, FILM COATED ORAL at 22:06

## 2021-01-01 RX ADMIN — HEPARIN SODIUM 5000 UNIT(S): 5000 INJECTION INTRAVENOUS; SUBCUTANEOUS at 06:09

## 2021-01-01 RX ADMIN — DEXMEDETOMIDINE HYDROCHLORIDE IN 0.9% SODIUM CHLORIDE 3.57 MICROGRAM(S)/KG/HR: 4 INJECTION INTRAVENOUS at 08:22

## 2021-01-01 RX ADMIN — LATANOPROST 1 DROP(S): 0.05 SOLUTION/ DROPS OPHTHALMIC; TOPICAL at 22:05

## 2021-01-01 RX ADMIN — FENTANYL CITRATE 3 MICROGRAM(S)/KG/HR: 50 INJECTION INTRAVENOUS at 22:03

## 2021-01-01 RX ADMIN — PROPOFOL 18 MICROGRAM(S)/KG/MIN: 10 INJECTION, EMULSION INTRAVENOUS at 12:32

## 2021-01-01 RX ADMIN — Medication 6.68 MICROGRAM(S)/KG/MIN: at 12:10

## 2021-01-01 RX ADMIN — LATANOPROST 1 DROP(S): 0.05 SOLUTION/ DROPS OPHTHALMIC; TOPICAL at 21:02

## 2021-01-01 RX ADMIN — OXCARBAZEPINE 300 MILLIGRAM(S): 300 TABLET, FILM COATED ORAL at 17:52

## 2021-01-01 RX ADMIN — SODIUM CHLORIDE 500 MILLILITER(S): 9 INJECTION INTRAMUSCULAR; INTRAVENOUS; SUBCUTANEOUS at 22:29

## 2021-01-01 RX ADMIN — CHLORHEXIDINE GLUCONATE 15 MILLILITER(S): 213 SOLUTION TOPICAL at 18:17

## 2021-01-01 RX ADMIN — PIPERACILLIN AND TAZOBACTAM 25 GRAM(S): 4; .5 INJECTION, POWDER, LYOPHILIZED, FOR SOLUTION INTRAVENOUS at 00:05

## 2021-01-01 RX ADMIN — RILUZOLE 50 MILLIGRAM(S): 50 TABLET ORAL at 09:02

## 2021-01-01 RX ADMIN — PIPERACILLIN AND TAZOBACTAM 25 GRAM(S): 4; .5 INJECTION, POWDER, LYOPHILIZED, FOR SOLUTION INTRAVENOUS at 15:34

## 2021-01-01 RX ADMIN — CHLORHEXIDINE GLUCONATE 1 APPLICATION(S): 213 SOLUTION TOPICAL at 05:46

## 2021-01-01 RX ADMIN — FENTANYL CITRATE 100 MICROGRAM(S): 50 INJECTION INTRAVENOUS at 11:16

## 2021-01-01 RX ADMIN — POTASSIUM PHOSPHATE, MONOBASIC POTASSIUM PHOSPHATE, DIBASIC 62.5 MILLIMOLE(S): 236; 224 INJECTION, SOLUTION INTRAVENOUS at 04:54

## 2021-01-01 RX ADMIN — Medication 650 MILLIGRAM(S): at 14:00

## 2021-01-01 RX ADMIN — Medication 9 MICROGRAM(S)/KG/MIN: at 12:32

## 2021-01-01 RX ADMIN — DEXMEDETOMIDINE HYDROCHLORIDE IN 0.9% SODIUM CHLORIDE 3.57 MICROGRAM(S)/KG/HR: 4 INJECTION INTRAVENOUS at 13:48

## 2021-01-01 RX ADMIN — FENTANYL CITRATE 14.3 MICROGRAM(S)/KG/HR: 50 INJECTION INTRAVENOUS at 17:32

## 2021-01-01 RX ADMIN — MORPHINE SULFATE 4 MG/HR: 50 CAPSULE, EXTENDED RELEASE ORAL at 14:51

## 2021-01-01 RX ADMIN — FENTANYL CITRATE 14.3 MICROGRAM(S)/KG/HR: 50 INJECTION INTRAVENOUS at 09:52

## 2021-01-01 RX ADMIN — RILUZOLE 50 MILLIGRAM(S): 50 TABLET ORAL at 09:06

## 2021-01-01 RX ADMIN — Medication 650 MILLIGRAM(S): at 17:06

## 2021-01-01 RX ADMIN — Medication 650 MILLIGRAM(S): at 09:55

## 2021-01-01 RX ADMIN — LEVETIRACETAM 750 MILLIGRAM(S): 250 TABLET, FILM COATED ORAL at 17:36

## 2021-01-01 RX ADMIN — CHLORHEXIDINE GLUCONATE 15 MILLILITER(S): 213 SOLUTION TOPICAL at 17:25

## 2021-01-01 RX ADMIN — SODIUM CHLORIDE 100 MILLILITER(S): 9 INJECTION INTRAMUSCULAR; INTRAVENOUS; SUBCUTANEOUS at 19:50

## 2021-01-01 RX ADMIN — LEVETIRACETAM 750 MILLIGRAM(S): 250 TABLET, FILM COATED ORAL at 17:25

## 2021-01-01 RX ADMIN — Medication 260 MILLIGRAM(S): at 20:20

## 2021-01-01 RX ADMIN — KETAMINE HYDROCHLORIDE 1.78 MG/KG/HR: 100 INJECTION INTRAMUSCULAR; INTRAVENOUS at 19:52

## 2021-01-01 RX ADMIN — LEVETIRACETAM 750 MILLIGRAM(S): 250 TABLET, FILM COATED ORAL at 05:40

## 2021-01-01 RX ADMIN — OXCARBAZEPINE 300 MILLIGRAM(S): 300 TABLET, FILM COATED ORAL at 17:24

## 2021-01-01 RX ADMIN — Medication 650 MILLIGRAM(S): at 11:00

## 2021-01-01 RX ADMIN — RILUZOLE 50 MILLIGRAM(S): 50 TABLET ORAL at 07:38

## 2021-01-01 RX ADMIN — CHLORHEXIDINE GLUCONATE 15 MILLILITER(S): 213 SOLUTION TOPICAL at 05:27

## 2021-01-01 RX ADMIN — Medication 6.68 MICROGRAM(S)/KG/MIN: at 17:37

## 2021-01-01 RX ADMIN — PIPERACILLIN AND TAZOBACTAM 25 GRAM(S): 4; .5 INJECTION, POWDER, LYOPHILIZED, FOR SOLUTION INTRAVENOUS at 02:48

## 2021-01-01 RX ADMIN — HEPARIN SODIUM 5000 UNIT(S): 5000 INJECTION INTRAVENOUS; SUBCUTANEOUS at 05:13

## 2021-01-01 RX ADMIN — PROPOFOL 18 MICROGRAM(S)/KG/MIN: 10 INJECTION, EMULSION INTRAVENOUS at 11:04

## 2021-01-01 RX ADMIN — Medication 650 MILLIGRAM(S): at 12:04

## 2021-01-01 RX ADMIN — OXCARBAZEPINE 300 MILLIGRAM(S): 300 TABLET, FILM COATED ORAL at 05:42

## 2021-01-01 RX ADMIN — PIPERACILLIN AND TAZOBACTAM 25 GRAM(S): 4; .5 INJECTION, POWDER, LYOPHILIZED, FOR SOLUTION INTRAVENOUS at 17:25

## 2021-01-01 RX ADMIN — Medication 9 MICROGRAM(S)/KG/MIN: at 07:33

## 2021-01-01 RX ADMIN — HEPARIN SODIUM 5000 UNIT(S): 5000 INJECTION INTRAVENOUS; SUBCUTANEOUS at 21:01

## 2021-01-01 RX ADMIN — OXCARBAZEPINE 300 MILLIGRAM(S): 300 TABLET, FILM COATED ORAL at 06:37

## 2021-01-01 RX ADMIN — Medication 9 MICROGRAM(S)/KG/MIN: at 11:04

## 2021-01-01 RX ADMIN — HEPARIN SODIUM 5000 UNIT(S): 5000 INJECTION INTRAVENOUS; SUBCUTANEOUS at 21:04

## 2021-01-01 RX ADMIN — MIDAZOLAM HYDROCHLORIDE 6 MILLIGRAM(S): 1 INJECTION, SOLUTION INTRAMUSCULAR; INTRAVENOUS at 08:07

## 2021-01-01 RX ADMIN — ALBUTEROL 2 PUFF(S): 90 AEROSOL, METERED ORAL at 22:42

## 2021-01-01 RX ADMIN — CEFTRIAXONE 100 MILLIGRAM(S): 500 INJECTION, POWDER, FOR SOLUTION INTRAMUSCULAR; INTRAVENOUS at 02:25

## 2021-01-01 RX ADMIN — Medication 9 MICROGRAM(S)/KG/MIN: at 09:01

## 2021-01-01 RX ADMIN — PIPERACILLIN AND TAZOBACTAM 25 GRAM(S): 4; .5 INJECTION, POWDER, LYOPHILIZED, FOR SOLUTION INTRAVENOUS at 16:11

## 2021-01-01 RX ADMIN — ETOMIDATE 20 MILLIGRAM(S): 2 INJECTION INTRAVENOUS at 11:04

## 2021-01-01 RX ADMIN — FENTANYL CITRATE 14.3 MICROGRAM(S)/KG/HR: 50 INJECTION INTRAVENOUS at 06:19

## 2021-01-01 RX ADMIN — RILUZOLE 50 MILLIGRAM(S): 50 TABLET ORAL at 08:21

## 2021-01-01 RX ADMIN — ENOXAPARIN SODIUM 40 MILLIGRAM(S): 100 INJECTION SUBCUTANEOUS at 17:25

## 2021-01-01 RX ADMIN — Medication 1 PACKET(S): at 13:48

## 2021-01-01 RX ADMIN — DEXMEDETOMIDINE HYDROCHLORIDE IN 0.9% SODIUM CHLORIDE 3.57 MICROGRAM(S)/KG/HR: 4 INJECTION INTRAVENOUS at 17:36

## 2021-01-01 RX ADMIN — ATORVASTATIN CALCIUM 40 MILLIGRAM(S): 80 TABLET, FILM COATED ORAL at 22:13

## 2021-01-01 RX ADMIN — DONEPEZIL HYDROCHLORIDE 10 MILLIGRAM(S): 10 TABLET, FILM COATED ORAL at 23:38

## 2021-01-01 RX ADMIN — FENTANYL CITRATE 100 MICROGRAM(S): 50 INJECTION INTRAVENOUS at 14:46

## 2021-01-01 RX ADMIN — FENTANYL CITRATE 100 MICROGRAM(S): 50 INJECTION INTRAVENOUS at 01:08

## 2021-01-01 RX ADMIN — HEPARIN SODIUM 5000 UNIT(S): 5000 INJECTION INTRAVENOUS; SUBCUTANEOUS at 14:12

## 2021-01-01 RX ADMIN — FENTANYL CITRATE 14.3 MICROGRAM(S)/KG/HR: 50 INJECTION INTRAVENOUS at 09:31

## 2021-01-01 RX ADMIN — Medication 6.68 MICROGRAM(S)/KG/MIN: at 19:22

## 2021-01-01 RX ADMIN — CHLORHEXIDINE GLUCONATE 15 MILLILITER(S): 213 SOLUTION TOPICAL at 17:36

## 2021-01-01 RX ADMIN — ATORVASTATIN CALCIUM 40 MILLIGRAM(S): 80 TABLET, FILM COATED ORAL at 23:03

## 2021-01-01 RX ADMIN — DEXMEDETOMIDINE HYDROCHLORIDE IN 0.9% SODIUM CHLORIDE 3.57 MICROGRAM(S)/KG/HR: 4 INJECTION INTRAVENOUS at 09:05

## 2021-01-01 RX ADMIN — OXCARBAZEPINE 300 MILLIGRAM(S): 300 TABLET, FILM COATED ORAL at 06:09

## 2021-01-01 RX ADMIN — DEXMEDETOMIDINE HYDROCHLORIDE IN 0.9% SODIUM CHLORIDE 3.57 MICROGRAM(S)/KG/HR: 4 INJECTION INTRAVENOUS at 20:57

## 2021-01-01 RX ADMIN — HEPARIN SODIUM 5000 UNIT(S): 5000 INJECTION INTRAVENOUS; SUBCUTANEOUS at 22:00

## 2021-01-01 RX ADMIN — Medication 6.68 MICROGRAM(S)/KG/MIN: at 20:56

## 2021-01-01 RX ADMIN — PIPERACILLIN AND TAZOBACTAM 25 GRAM(S): 4; .5 INJECTION, POWDER, LYOPHILIZED, FOR SOLUTION INTRAVENOUS at 09:04

## 2021-01-01 RX ADMIN — Medication 6.68 MICROGRAM(S)/KG/MIN: at 01:18

## 2021-01-01 RX ADMIN — Medication 650 MILLIGRAM(S): at 17:02

## 2021-01-01 RX ADMIN — PIPERACILLIN AND TAZOBACTAM 25 GRAM(S): 4; .5 INJECTION, POWDER, LYOPHILIZED, FOR SOLUTION INTRAVENOUS at 09:55

## 2021-01-01 RX ADMIN — PROPOFOL 4.28 MICROGRAM(S)/KG/MIN: 10 INJECTION, EMULSION INTRAVENOUS at 09:31

## 2021-01-01 RX ADMIN — FENTANYL CITRATE 100 MICROGRAM(S): 50 INJECTION INTRAVENOUS at 08:23

## 2021-01-01 RX ADMIN — DEXMEDETOMIDINE HYDROCHLORIDE IN 0.9% SODIUM CHLORIDE 3.57 MICROGRAM(S)/KG/HR: 4 INJECTION INTRAVENOUS at 19:51

## 2021-01-01 RX ADMIN — LATANOPROST 1 DROP(S): 0.05 SOLUTION/ DROPS OPHTHALMIC; TOPICAL at 21:03

## 2021-01-01 RX ADMIN — Medication 100 MILLIEQUIVALENT(S): at 23:38

## 2021-01-01 RX ADMIN — LATANOPROST 1 DROP(S): 0.05 SOLUTION/ DROPS OPHTHALMIC; TOPICAL at 23:47

## 2021-01-01 RX ADMIN — Medication 6.68 MICROGRAM(S)/KG/MIN: at 06:19

## 2021-01-01 RX ADMIN — PROPOFOL 18 MICROGRAM(S)/KG/MIN: 10 INJECTION, EMULSION INTRAVENOUS at 09:02

## 2021-01-01 RX ADMIN — ENOXAPARIN SODIUM 40 MILLIGRAM(S): 100 INJECTION SUBCUTANEOUS at 11:48

## 2021-01-01 RX ADMIN — FENTANYL CITRATE 3.57 MICROGRAM(S)/KG/HR: 50 INJECTION INTRAVENOUS at 12:10

## 2021-01-01 RX ADMIN — DEXMEDETOMIDINE HYDROCHLORIDE IN 0.9% SODIUM CHLORIDE 3.57 MICROGRAM(S)/KG/HR: 4 INJECTION INTRAVENOUS at 07:40

## 2021-01-01 RX ADMIN — CHLORHEXIDINE GLUCONATE 1 APPLICATION(S): 213 SOLUTION TOPICAL at 06:35

## 2021-01-01 RX ADMIN — ENOXAPARIN SODIUM 40 MILLIGRAM(S): 100 INJECTION SUBCUTANEOUS at 12:31

## 2021-01-01 RX ADMIN — FENTANYL CITRATE 100 MICROGRAM(S): 50 INJECTION INTRAVENOUS at 07:50

## 2021-01-01 RX ADMIN — CHLORHEXIDINE GLUCONATE 15 MILLILITER(S): 213 SOLUTION TOPICAL at 05:09

## 2021-01-01 RX ADMIN — ATORVASTATIN CALCIUM 40 MILLIGRAM(S): 80 TABLET, FILM COATED ORAL at 21:01

## 2021-01-01 RX ADMIN — LEVETIRACETAM 750 MILLIGRAM(S): 250 TABLET, FILM COATED ORAL at 05:47

## 2021-01-01 RX ADMIN — RILUZOLE 50 MILLIGRAM(S): 50 TABLET ORAL at 09:28

## 2021-01-01 RX ADMIN — ATORVASTATIN CALCIUM 40 MILLIGRAM(S): 80 TABLET, FILM COATED ORAL at 21:35

## 2021-01-01 RX ADMIN — DEXMEDETOMIDINE HYDROCHLORIDE IN 0.9% SODIUM CHLORIDE 3.57 MICROGRAM(S)/KG/HR: 4 INJECTION INTRAVENOUS at 19:50

## 2021-01-01 RX ADMIN — HEPARIN SODIUM 5000 UNIT(S): 5000 INJECTION INTRAVENOUS; SUBCUTANEOUS at 06:01

## 2021-01-01 RX ADMIN — HEPARIN SODIUM 5000 UNIT(S): 5000 INJECTION INTRAVENOUS; SUBCUTANEOUS at 23:47

## 2021-01-01 RX ADMIN — LEVETIRACETAM 750 MILLIGRAM(S): 250 TABLET, FILM COATED ORAL at 18:32

## 2021-01-01 RX ADMIN — CHLORHEXIDINE GLUCONATE 15 MILLILITER(S): 213 SOLUTION TOPICAL at 18:31

## 2021-01-01 RX ADMIN — PIPERACILLIN AND TAZOBACTAM 200 GRAM(S): 4; .5 INJECTION, POWDER, LYOPHILIZED, FOR SOLUTION INTRAVENOUS at 00:40

## 2021-01-01 RX ADMIN — KETAMINE HYDROCHLORIDE 1.78 MG/KG/HR: 100 INJECTION INTRAMUSCULAR; INTRAVENOUS at 07:34

## 2021-01-01 RX ADMIN — PROPOFOL 4.28 MICROGRAM(S)/KG/MIN: 10 INJECTION, EMULSION INTRAVENOUS at 01:18

## 2021-01-01 RX ADMIN — LATANOPROST 1 DROP(S): 0.05 SOLUTION/ DROPS OPHTHALMIC; TOPICAL at 23:39

## 2021-01-01 RX ADMIN — LEVETIRACETAM 400 MILLIGRAM(S): 250 TABLET, FILM COATED ORAL at 06:38

## 2021-01-01 RX ADMIN — RILUZOLE 50 MILLIGRAM(S): 50 TABLET ORAL at 08:22

## 2021-01-01 RX ADMIN — RILUZOLE 50 MILLIGRAM(S): 50 TABLET ORAL at 09:01

## 2021-01-01 RX ADMIN — PIPERACILLIN AND TAZOBACTAM 25 GRAM(S): 4; .5 INJECTION, POWDER, LYOPHILIZED, FOR SOLUTION INTRAVENOUS at 18:15

## 2021-01-01 RX ADMIN — OXCARBAZEPINE 300 MILLIGRAM(S): 300 TABLET, FILM COATED ORAL at 18:16

## 2021-01-01 RX ADMIN — LATANOPROST 1 DROP(S): 0.05 SOLUTION/ DROPS OPHTHALMIC; TOPICAL at 23:03

## 2021-01-01 RX ADMIN — CHLORHEXIDINE GLUCONATE 15 MILLILITER(S): 213 SOLUTION TOPICAL at 05:55

## 2021-01-01 RX ADMIN — DONEPEZIL HYDROCHLORIDE 10 MILLIGRAM(S): 10 TABLET, FILM COATED ORAL at 21:02

## 2021-01-01 RX ADMIN — POTASSIUM PHOSPHATE, MONOBASIC POTASSIUM PHOSPHATE, DIBASIC 62.5 MILLIMOLE(S): 236; 224 INJECTION, SOLUTION INTRAVENOUS at 23:31

## 2021-01-01 RX ADMIN — Medication 650 MILLIGRAM(S): at 20:35

## 2021-01-01 RX ADMIN — Medication 6.68 MICROGRAM(S)/KG/MIN: at 17:32

## 2021-01-01 RX ADMIN — LEVETIRACETAM 750 MILLIGRAM(S): 250 TABLET, FILM COATED ORAL at 18:10

## 2021-01-01 RX ADMIN — Medication 6.68 MICROGRAM(S)/KG/MIN: at 08:21

## 2021-01-01 RX ADMIN — DONEPEZIL HYDROCHLORIDE 10 MILLIGRAM(S): 10 TABLET, FILM COATED ORAL at 21:36

## 2021-01-01 RX ADMIN — FENTANYL CITRATE 100 MICROGRAM(S): 50 INJECTION INTRAVENOUS at 16:37

## 2021-01-01 RX ADMIN — OXCARBAZEPINE 300 MILLIGRAM(S): 300 TABLET, FILM COATED ORAL at 06:39

## 2021-01-01 RX ADMIN — LEVETIRACETAM 400 MILLIGRAM(S): 250 TABLET, FILM COATED ORAL at 19:42

## 2021-01-01 RX ADMIN — DEXMEDETOMIDINE HYDROCHLORIDE IN 0.9% SODIUM CHLORIDE 3.57 MICROGRAM(S)/KG/HR: 4 INJECTION INTRAVENOUS at 11:00

## 2021-01-01 RX ADMIN — Medication 650 MILLIGRAM(S): at 12:43

## 2021-01-01 RX ADMIN — LATANOPROST 1 DROP(S): 0.05 SOLUTION/ DROPS OPHTHALMIC; TOPICAL at 21:35

## 2021-01-01 RX ADMIN — Medication 1 PACKET(S): at 02:56

## 2021-01-01 RX ADMIN — OXCARBAZEPINE 300 MILLIGRAM(S): 300 TABLET, FILM COATED ORAL at 05:10

## 2021-01-01 RX ADMIN — ATORVASTATIN CALCIUM 40 MILLIGRAM(S): 80 TABLET, FILM COATED ORAL at 22:24

## 2021-01-01 RX ADMIN — Medication 650 MILLIGRAM(S): at 16:28

## 2021-01-01 RX ADMIN — Medication 100 MILLIEQUIVALENT(S): at 02:17

## 2021-01-01 RX ADMIN — CHLORHEXIDINE GLUCONATE 15 MILLILITER(S): 213 SOLUTION TOPICAL at 18:10

## 2021-01-01 RX ADMIN — HEPARIN SODIUM 5000 UNIT(S): 5000 INJECTION INTRAVENOUS; SUBCUTANEOUS at 05:41

## 2021-01-01 RX ADMIN — LEVETIRACETAM 750 MILLIGRAM(S): 250 TABLET, FILM COATED ORAL at 06:37

## 2021-01-01 RX ADMIN — ATORVASTATIN CALCIUM 40 MILLIGRAM(S): 80 TABLET, FILM COATED ORAL at 22:00

## 2021-01-01 RX ADMIN — CHLORHEXIDINE GLUCONATE 1 APPLICATION(S): 213 SOLUTION TOPICAL at 06:39

## 2021-01-01 RX ADMIN — PIPERACILLIN AND TAZOBACTAM 25 GRAM(S): 4; .5 INJECTION, POWDER, LYOPHILIZED, FOR SOLUTION INTRAVENOUS at 01:25

## 2021-01-01 RX ADMIN — Medication 85 MILLIMOLE(S): at 02:56

## 2021-01-01 RX ADMIN — CHLORHEXIDINE GLUCONATE 15 MILLILITER(S): 213 SOLUTION TOPICAL at 17:52

## 2021-01-01 RX ADMIN — ENOXAPARIN SODIUM 40 MILLIGRAM(S): 100 INJECTION SUBCUTANEOUS at 11:12

## 2021-01-01 RX ADMIN — OXCARBAZEPINE 300 MILLIGRAM(S): 300 TABLET, FILM COATED ORAL at 05:47

## 2021-01-01 RX ADMIN — Medication 5 MILLIGRAM(S): at 10:36

## 2021-01-01 RX ADMIN — ATORVASTATIN CALCIUM 40 MILLIGRAM(S): 80 TABLET, FILM COATED ORAL at 23:38

## 2021-01-01 RX ADMIN — KETAMINE HYDROCHLORIDE 1.78 MG/KG/HR: 100 INJECTION INTRAMUSCULAR; INTRAVENOUS at 12:17

## 2021-01-01 RX ADMIN — PIPERACILLIN AND TAZOBACTAM 25 GRAM(S): 4; .5 INJECTION, POWDER, LYOPHILIZED, FOR SOLUTION INTRAVENOUS at 09:31

## 2021-01-01 RX ADMIN — DONEPEZIL HYDROCHLORIDE 10 MILLIGRAM(S): 10 TABLET, FILM COATED ORAL at 23:39

## 2021-01-01 RX ADMIN — MIDAZOLAM HYDROCHLORIDE 4 MILLIGRAM(S): 1 INJECTION, SOLUTION INTRAMUSCULAR; INTRAVENOUS at 16:35

## 2021-01-01 RX ADMIN — DEXMEDETOMIDINE HYDROCHLORIDE IN 0.9% SODIUM CHLORIDE 3.57 MICROGRAM(S)/KG/HR: 4 INJECTION INTRAVENOUS at 19:42

## 2021-01-01 RX ADMIN — SODIUM CHLORIDE 500 MILLILITER(S): 9 INJECTION INTRAMUSCULAR; INTRAVENOUS; SUBCUTANEOUS at 19:51

## 2021-01-01 RX ADMIN — CHLORHEXIDINE GLUCONATE 15 MILLILITER(S): 213 SOLUTION TOPICAL at 17:24

## 2021-01-01 RX ADMIN — LEVETIRACETAM 750 MILLIGRAM(S): 250 TABLET, FILM COATED ORAL at 17:52

## 2021-01-01 RX ADMIN — Medication 9 MICROGRAM(S)/KG/MIN: at 19:53

## 2021-01-01 RX ADMIN — LEVETIRACETAM 750 MILLIGRAM(S): 250 TABLET, FILM COATED ORAL at 17:10

## 2021-01-01 RX ADMIN — HEPARIN SODIUM 5000 UNIT(S): 5000 INJECTION INTRAVENOUS; SUBCUTANEOUS at 21:35

## 2021-01-01 RX ADMIN — PIPERACILLIN AND TAZOBACTAM 25 GRAM(S): 4; .5 INJECTION, POWDER, LYOPHILIZED, FOR SOLUTION INTRAVENOUS at 11:53

## 2021-01-01 RX ADMIN — Medication 650 MILLIGRAM(S): at 00:05

## 2021-01-01 RX ADMIN — CHLORHEXIDINE GLUCONATE 15 MILLILITER(S): 213 SOLUTION TOPICAL at 05:12

## 2021-01-01 RX ADMIN — DEXMEDETOMIDINE HYDROCHLORIDE IN 0.9% SODIUM CHLORIDE 3.57 MICROGRAM(S)/KG/HR: 4 INJECTION INTRAVENOUS at 19:21

## 2021-01-01 RX ADMIN — CHLORHEXIDINE GLUCONATE 1 APPLICATION(S): 213 SOLUTION TOPICAL at 06:26

## 2021-01-01 RX ADMIN — PIPERACILLIN AND TAZOBACTAM 25 GRAM(S): 4; .5 INJECTION, POWDER, LYOPHILIZED, FOR SOLUTION INTRAVENOUS at 01:19

## 2021-01-01 RX ADMIN — DONEPEZIL HYDROCHLORIDE 10 MILLIGRAM(S): 10 TABLET, FILM COATED ORAL at 23:48

## 2021-01-01 RX ADMIN — DONEPEZIL HYDROCHLORIDE 10 MILLIGRAM(S): 10 TABLET, FILM COATED ORAL at 21:03

## 2021-01-01 RX ADMIN — CHLORHEXIDINE GLUCONATE 1 APPLICATION(S): 213 SOLUTION TOPICAL at 06:09

## 2021-01-01 RX ADMIN — FENTANYL CITRATE 3 MICROGRAM(S)/KG/HR: 50 INJECTION INTRAVENOUS at 09:01

## 2021-01-01 RX ADMIN — DEXMEDETOMIDINE HYDROCHLORIDE IN 0.9% SODIUM CHLORIDE 3.57 MICROGRAM(S)/KG/HR: 4 INJECTION INTRAVENOUS at 07:34

## 2021-01-01 RX ADMIN — OXCARBAZEPINE 300 MILLIGRAM(S): 300 TABLET, FILM COATED ORAL at 18:06

## 2021-01-01 RX ADMIN — LEVETIRACETAM 750 MILLIGRAM(S): 250 TABLET, FILM COATED ORAL at 06:38

## 2021-01-01 RX ADMIN — POTASSIUM PHOSPHATE, MONOBASIC POTASSIUM PHOSPHATE, DIBASIC 83.33 MILLIMOLE(S): 236; 224 INJECTION, SOLUTION INTRAVENOUS at 12:49

## 2021-01-01 RX ADMIN — MIDAZOLAM HYDROCHLORIDE 2 MILLIGRAM(S): 1 INJECTION, SOLUTION INTRAMUSCULAR; INTRAVENOUS at 23:48

## 2021-01-01 RX ADMIN — LEVETIRACETAM 750 MILLIGRAM(S): 250 TABLET, FILM COATED ORAL at 05:09

## 2021-01-01 RX ADMIN — Medication 650 MILLIGRAM(S): at 19:00

## 2021-01-01 RX ADMIN — FENTANYL CITRATE 100 MICROGRAM(S): 50 INJECTION INTRAVENOUS at 08:55

## 2021-01-01 RX ADMIN — MIDAZOLAM HYDROCHLORIDE 4 MILLIGRAM(S): 1 INJECTION, SOLUTION INTRAMUSCULAR; INTRAVENOUS at 21:03

## 2021-01-01 RX ADMIN — PIPERACILLIN AND TAZOBACTAM 25 GRAM(S): 4; .5 INJECTION, POWDER, LYOPHILIZED, FOR SOLUTION INTRAVENOUS at 11:12

## 2021-01-01 RX ADMIN — POTASSIUM PHOSPHATE, MONOBASIC POTASSIUM PHOSPHATE, DIBASIC 83.33 MILLIMOLE(S): 236; 224 INJECTION, SOLUTION INTRAVENOUS at 08:59

## 2021-01-01 RX ADMIN — Medication 650 MILLIGRAM(S): at 00:20

## 2021-07-04 NOTE — ED PROVIDER NOTE - WET READ LAUNCH FT
There is 1 Wet Read(s) to document. There are no Wet Read(s) to document. Patient with SCC here for pain control. Patient discussed his dose of Dilaudid did not relief his pain. He report of not wanting to use all his meds.

## 2021-07-04 NOTE — ED ADULT NURSE NOTE - OBJECTIVE STATEMENT
Pt received AOx1, ambulatory w/ walker at baseline per wife presents to the ED w/ chief complaint of SOB, low pulse ox ~81% on RA at home, progressive weakness, decreased in appetite, more difficulty swallowing for the pas Pt received AOx1, ambulatory w/ walker at baseline w. pmhx of seizure and ALS per wife presents to the ED w/ chief complaint of SOB, low pulse ox ~81% on RA at home, progressive weakness, decreased in appetite, more difficulty swallowing for the past 2 days. Pt noted to have difficulty in speaking. Pt initially saturating in ~95% on RA, however, placed on 2L for comfort and saturating well; however, soon, pt started to decline in status, saturating in the low 80s, placed on NRB, saturating 80-90%. MD made aware, decision made to intubate patient. Pt intubated at the moment. 20G LAC. 20G RAC. 20G left wrist area.

## 2021-07-04 NOTE — ED PROVIDER NOTE - OBJECTIVE STATEMENT
70M remote hx seizure (on antiepileptic), recent diagnosis ?ALS (on riluzole) p/e SOB. Per wife at bedside patient noted hypoxic to 80 on home pulse ox, "breathing quickly". Patient appears more lethargic to wife. Notes patient otherwise w/o recent f/c/n/v, no cough, no reported abd pain or urinary symptoms.

## 2021-07-04 NOTE — H&P ADULT - ATTENDING COMMENTS
Acute on chronic hypercapnic respiratory failure in setting of recently diagnosed neuromuscular disease (?ALS).  Unclear if inciting factor or if progression of disease. No overt sign of infectious etiology.  On vent support. Follow up cultures. Check CT head.  SAT/SBT as tolerated. Follow up work up from neuro.

## 2021-07-04 NOTE — H&P ADULT - NSHPPHYSICALEXAM_GEN_ALL_CORE
VITALS: T(C): 36.8 (07-04-21 @ 12:10), Max: 36.9 (07-04-21 @ 09:09)  HR: 82 (07-04-21 @ 13:30) (63 - 122)  BP: 101/69 (07-04-21 @ 13:30) (64/41 - 172/92)  RR: 25 (07-04-21 @ 13:30) (18 - 25)  SpO2: 99% (07-04-21 @ 13:30) (96% - 100%)  GENERAL: NAD, well-developed, alert, resting comfortably  HEAD:  Atraumatic, Normocephalic  EYES: EOMI, PERRLA, conjunctiva and sclera clear  NECK: Supple, No JVD  CHEST/LUNG: Clear to auscultation bilaterally; No wheeze, ronchi or rales  HEART: Regular rate and rhythm; No murmurs, rubs, or gallops  ABDOMEN: Soft, Nontender, Nondistended; Bowel sounds present  EXTREMITIES:  2+ Peripheral Pulses, No clubbing, cyanosis, or edema  PSYCH: AAOx3, normal behavior, normal affect  NEUROLOGY: non-focal, normal strength, normal sensation  SKIN: No rashes or lesions VITALS: T(C): 36.8 (07-04-21 @ 12:10), Max: 36.9 (07-04-21 @ 09:09)  HR: 82 (07-04-21 @ 13:30) (63 - 122)  BP: 101/69 (07-04-21 @ 13:30) (64/41 - 172/92)  RR: 25 (07-04-21 @ 13:30) (18 - 25)  SpO2: 99% (07-04-21 @ 13:30) (96% - 100%)  GENERAL: sedated and intubated  HEAD:  Atraumatic, Normocephalic  EYES: PERRLA, conjunctiva and sclera clear  NECK: Supple, No JVD  CHEST/LUNG: Clear to auscultation bilaterally; No wheeze, ronchi or rales  HEART: Regular rate and rhythm; No murmurs, rubs, or gallops  ABDOMEN: Soft, Nontender, Nondistended; Bowel sounds present  EXTREMITIES:  2+ Peripheral Pulses, No clubbing, cyanosis, or edema  PSYCH: sedated and intubated  NEUROLOGY: withdraw to painful stimuli  SKIN: No rashes or lesions

## 2021-07-04 NOTE — ED PROVIDER NOTE - CARE PLAN
Principal Discharge DX:	Respiratory failure  Secondary Diagnosis:	Neuromuscular weakness  Secondary Diagnosis:	Altered mental status

## 2021-07-04 NOTE — H&P ADULT - HISTORY OF PRESENT ILLNESS
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 Patient started having difficulty with breathing back in February 2021, underwent cardiopulmonary evaluation where it was determined that his diaphragm was weak. He is followed by outpatient neuro (Dr. Carrasco) who is evaluating patient for possible ALS though most of the workup including EMG was negative thus far. About one month ago, patient was started on Riluzole. Pt has a history of seizures treated with Keppra and Oxcarbazepine, his last seizure was over 10 years ago. This morning, patient was found to be increasingly hypoxic to the low 90s, was told to come to ED by his PMDs office. While in the ED, he started having increasing work of breathing, was not able to pull any volumes on incentive spirometer or participate in a NIF. His mental status acutely decompensated and patient was intubated for airway protection and brought to the MICU.  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 Patient started having difficulty with breathing back in February 2021, underwent cardiopulmonary evaluation where it was determined that his diaphragm was weak. He is followed by outpatient neuro (Dr. Carrasco) who is evaluating patient for possible ALS though most of the workup including EMG was negative thus far. Wife reports that he might have a variant of ALS and that he is to seek second opinion at the end of this month. Wife states that Dr. Carrasco is working him up for bekah disease. About one month ago, patient was started on Riluzole. Pt has a history of seizures treated with Keppra and Oxcarbazepine, his last seizure was over 10 years ago. This morning, patient was found to be increasingly hypoxic to the low 90s, was told to come to ED by his PMDs office. While in the ED, he started having increasing work of breathing, was not able to pull any volumes on incentive spirometer or participate in a NIF. His mental status acutely decompensated and patient was intubated for airway protection and brought to the MICU.

## 2021-07-04 NOTE — H&P ADULT - NSHPSOCIALHISTORY_GEN_ALL_CORE
, baseline ambulatory with cane, denies alcohol/drug/tobacco. , baseline ambulatory with cane, denies alcohol/drug/tobacco per wife

## 2021-07-04 NOTE — H&P ADULT - NSHPREVIEWOFSYSTEMS_GEN_ALL_CORE
Unable to obtain as patient was intubated and sedated, per wife patient had +weakness, shortness of breath, hallucinations

## 2021-07-04 NOTE — CONSULT NOTE ADULT - SUBJECTIVE AND OBJECTIVE BOX
HPI:  Patient is a 69yo M with pmhx of seizures, HLD and currently being worked up for a neuromuscular disorder (?ALS) who presented to the ED for complaint of low O2 sats. Wife at bedside to provide collateral. Patient was noted to have O2 sat of less than 94% this AM and was recommended to present to the ED by PCP office. Patient had not slept well last night and per wife was having hallucinations. Per wife patient has been having increased difficulty breathing since Feb 2021. He has had evaluation by cardiology and pulmonology and it was determined his diaphragm was weak. He is currently being evaluated for a neuromuscular disorder with a ?ALS diagnosis. He was started on Riluzole by his outpatient neurologist Dr. Carrasco 1 month ago. Patient additionally has hx of seizures being treated with Keppra and Oxcarbazepine. His last seizure was over 10yrs ago. At baseline patient ambulates with a cane/walker and is oriented x 3. Patient does not usually have difficulty swallowing, eating or having coughing while eating. Patient usually has some SOB but is functional and able to perform most of his ADLs. Since yesterday patient has had decreased appetite, increased hypoxemia and hallucinations.       ROS: Unable to perform due to patient mental status     PAST MEDICAL & SURGICAL HISTORY:    FAMILY HISTORY:      SOCIAL HISTORY: SOCIAL HISTORY:     Marital Status: (  )   (  ) Single  (  )   (  )      Occupation:      Lives: (  ) alone  (  ) with children   (  ) with spouse  (  ) with parents  (  ) other     Illicit Drug Use: (  ) never used  (  ) other _____     Tobacco Use:  (  ) never smoked  (  ) former smoker  (  ) current smoker  (  ) pack year  (  ) last cigarette date     Alcohol Use:      Sexual History:        MEDICATIONS  Home Medications:      MEDICATIONS  (STANDING):  albuterol/ipratropium for Nebulization. 3 milliLiter(s) Nebulizer once    MEDICATIONS  (PRN):      ALLERGIES/INTOLERANCES:  Allergies  No Known Allergies    Intolerances      OBJECTIVE:  VITALS   Vital Signs Last 24 Hrs  T(C): 36.9 (04 Jul 2021 09:09), Max: 36.9 (04 Jul 2021 09:09)  T(F): 98.5 (04 Jul 2021 09:09), Max: 98.5 (04 Jul 2021 09:09)  HR: 73 (04 Jul 2021 09:09) (63 - 73)  BP: 172/92 (04 Jul 2021 09:09) (167/75 - 172/92)  BP(mean): --  RR: 20 (04 Jul 2021 09:09) (20 - 22)  SpO2: 100% (04 Jul 2021 09:09) (97% - 100%)    PHYSICAL EXAM:  Neurological Exam:  Mental Status: Awake, eyes open, not alter, does not follow any commands. No verbal output. .    Cranial Nerves: pupils 2mm and reactive bilaterally. unable to assess EOMI, No BTT b/l, No facial asymmetry.    Motor: No spontaneous movement of extremities. Significant neck weakness, unable to hold head up, neck flexed.   Dysmetria: Unable to assess   Tremor: No resting tremor  Sensation: Does not grimace or withdraw to painful stimuli x 4   Deep Tendon Reflexes: 2+ bilateral biceps, triceps, brachioradialis, mute knee and ankle  Toes mute bilaterally  Gait: Unable to assess    LABORATORY:  CBC   Chem       LFTs   Coagulopathy   Lipid Panel   A1c   Cardiac enzymes     U/A   CSF  Immunological  Other    STUDIES & IMAGING:  Studies (EKG, EEG, EMG, etc):     Radiology (XR, CT, MR, U/S, TTE/BHARGAVI): HPI:  Patient is a 71yo M with pmhx of seizures, HLD and currently being worked up for a motor neuron disorder (?ALS) who presented to the ED for complaint of low O2 sats. Wife at bedside to provide collateral. Patient was noted to have O2 sat of less than 94% this AM and was recommended to present to the ED by PCP office. Patient had not slept well last night and per wife was having hallucinations. Per wife patient has been having increased difficulty breathing since Feb 2021. He has had evaluation by cardiology and pulmonology and it was determined his diaphragm was weak. He is currently being evaluated for a motor neuron disorder with a ?ALS diagnosis. He was started on Riluzole by his outpatient neurologist Dr. Carrasco 1 month ago. Patient additionally has hx of seizures being treated with Keppra and Oxcarbazepine. His last seizure was over 10yrs ago. At baseline patient ambulates with a cane/walker and is oriented x 3. Patient does not usually have difficulty swallowing, eating or having coughing while eating. Patient usually has some SOB but is functional and able to perform most of his ADLs. Since yesterday patient has had decreased appetite, increased hypoxemia and hallucinations.       ROS: Unable to perform due to patient mental status     PAST MEDICAL & SURGICAL HISTORY:    FAMILY HISTORY:      SOCIAL HISTORY: SOCIAL HISTORY:     Marital Status: (  )   (  ) Single  (  )   (  )      Occupation:      Lives: (  ) alone  (  ) with children   (  ) with spouse  (  ) with parents  (  ) other     Illicit Drug Use: (  ) never used  (  ) other _____     Tobacco Use:  (  ) never smoked  (  ) former smoker  (  ) current smoker  (  ) pack year  (  ) last cigarette date     Alcohol Use:      Sexual History:        MEDICATIONS  Home Medications:      MEDICATIONS  (STANDING):  albuterol/ipratropium for Nebulization. 3 milliLiter(s) Nebulizer once    MEDICATIONS  (PRN):      ALLERGIES/INTOLERANCES:  Allergies  No Known Allergies    Intolerances      OBJECTIVE:  VITALS   Vital Signs Last 24 Hrs  T(C): 36.9 (04 Jul 2021 09:09), Max: 36.9 (04 Jul 2021 09:09)  T(F): 98.5 (04 Jul 2021 09:09), Max: 98.5 (04 Jul 2021 09:09)  HR: 73 (04 Jul 2021 09:09) (63 - 73)  BP: 172/92 (04 Jul 2021 09:09) (167/75 - 172/92)  BP(mean): --  RR: 20 (04 Jul 2021 09:09) (20 - 22)  SpO2: 100% (04 Jul 2021 09:09) (97% - 100%)    PHYSICAL EXAM:  Neurological Exam:  Mental Status: Awake, eyes open, not alter, does not follow any commands. No verbal output. .    Cranial Nerves: pupils 2mm and reactive bilaterally. unable to assess EOMI, No BTT b/l, No facial asymmetry.    Motor: No spontaneous movement of extremities. Significant neck weakness, unable to hold head up, neck flexed.   Dysmetria: Unable to assess   Tremor: No resting tremor  Sensation: Does not grimace or withdraw to painful stimuli x 4   Deep Tendon Reflexes: 2+ bilateral biceps, triceps, brachioradialis, mute knee and ankle  Toes mute bilaterally  Gait: Unable to assess    LABORATORY:  CBC   Chem       LFTs   Coagulopathy   Lipid Panel   A1c   Cardiac enzymes     U/A   CSF  Immunological  Other    STUDIES & IMAGING:  Studies (EKG, EEG, EMG, etc):     Radiology (XR, CT, MR, U/S, TTE/BHARGAVI):

## 2021-07-04 NOTE — CONSULT NOTE ADULT - ASSESSMENT
Patient is a 69yo M with pmhx of seizures, HLD and currently being worked up for a neuromuscular disorder (?ALS) who presented to the ED for complaint of low O2 sats. Patient with significant decline in the past 1 day with little sleep and hallucinations last night. He had O2 sat less than 94% at home prompting PCP to refer him to ED. Patient at baseline is conversant, oriented x 3, ambulated with cane/walker. Currently being worked up for neuromuscular disorder, possible ALS, started on Riluzole 1 month ago. [facility] [place] [facility] Hard[facility]. Outpatient EMG and blood work has been non-diagnostic. He has outpatient appoint with Dr. Gill schedule in the next month. Neuro exam concerning for significant for neck weakness, encephalopathy (not following any commands or with any spontaneous movements), increasing hypoxemia (sating 86% on 3L nasal cannula). patient with mild leukocytosis of 10.6 and mild hyponatremia of 133.     Impression   Acute hypoxemia and encephalopathy in the setting of a possible neuromuscular disorder. Unclear etiology for acute decompensation at this time, the encephalopathy would not be characteristic of ALS, possible other underlying/superimposed infectious or metabolic etiology as well.     Recommendations   - Infectious/ metabolic work up per primary team, check UA   - Low threshold for elective intubation  Patient is a 69yo M with pmhx of seizures, HLD and currently being worked up for a neuromuscular disorder (?ALS) who presented to the ED for complaint of low O2 sats. Patient with significant decline in the past 1 day with little sleep and hallucinations last night. He had O2 sat less than 94% at home prompting PCP to refer him to ED. Patient at baseline is conversant, oriented x 3, ambulated with cane/walker. Currently being worked up for motor neuron motor ndisorder, possible ALS, started on Riluzole 1 month ago. No prior records available in [place] or [facility]. Dr. Harding notified. Outpatient EMG and blood work has been non-diagnostic. He has outpatient appoint with Dr. Jairo pichardo in the next month. Neuro exam concerning for significant for neck weakness, encephalopathy (not following any commands or with any spontaneous movements), increasing hypoxemia (sating 86% on 3L nasal cannula). patient with mild leukocytosis of 10.6 and mild hyponatremia of 133.     Impression   Acute hypoxemia and encephalopathy in the setting of a possible motor neuron disorder vs neuromuscular disorder. Unclear etiology for acute decompensation at this time, the encephalopathy would not be characteristic of ALS, possible other underlying/superimposed infectious or metabolic etiology as well.     Recommendations   - Infectious/ metabolic work up per primary team, check UA   - Low threshold for elective intubation   - Labs: acetylcholine receptor antibody (binding, blocking and modulating), MUSK antibody, LRP4 antibody, Vitamin b12, Thiamine, Folate  - supportive care per primary team   - If encephalopathy continues despite supportive management, consider LP      Case to be discussed with neurology attending Dr. Dudley

## 2021-07-04 NOTE — H&P ADULT - ASSESSMENT
70M w/ PMHx seizures, HTN, and possible ALS presenting for weakness and shortness of breath, found to be hypoxic and intubated for airway protection in setting of poor inspiratory effort.       #Neuro  -Sedated on prop and fent, daily sedation vacations to assess neuro status and SBT as tolerated  -Encephalopathy possible etiologies include infectious, metabolic,  70M w/ PMHx seizures, HTN, and possible ALS presenting for weakness and shortness of breath, found to be hypoxic and intubated for airway protection in setting of poor inspiratory effort.       #Neuro  -Sedated on prop and fent, daily sedation vacations to assess neuro status and SBT as tolerated  -Encephalopathy possible etiologies include infectious, metabolic, structural  -outpt w/u to eval for ALS/bekah disease (Dr. Carrasco), on riluzole  -house neuro consulted, f/u recs 70M w/ PMHx seizures, HTN, and possible ALS presenting for weakness and shortness of breath, found to be hypoxic and intubated for airway protection in setting of poor inspiratory effort.       #Neuro  -Sedated on prop and fent, daily sedation vacations to assess neuro status and SBT as tolerated  -Encephalopathy possible etiologies include infectious, metabolic, structural  -outpt w/u to eval for ALS/bekah disease (Dr. Carrasco), on home riluzole  -house neuro consulted, f/u recs  -c/w home keppra, oxcarbazepine, riluzole    #Pulm  -acute on chronic hypercapneic respiratory failure, initial VBG pH 7.17, pCO2>110, bicarb 32; intubated in the ED. CXT no acute finding. Likely in the setting of motor neuron dysfunction. Considered infectious etiology but CXR neg and no reported cough, less likely at this time  -f/u repeat ABG, adjust vent setting as appropriate  -will do bedside US to eval     #CV  -on pressor in the setting of sedation  -wean as tolerated    #GI  -no active issue  -start tube feed    #Renal  -elevated bicarb, likely in the setting of chronic respiratory acidosis  -monitor UOP    #Endo  -FS    70M w/ PMHx seizures, HTN, and possible ALS presenting for weakness and shortness of breath, found to be hypoxic and intubated for airway protection in setting of poor inspiratory effort.       #Neuro  -Sedated on prop and fent, daily sedation vacations to assess neuro status and SBT as tolerated  -Encephalopathy possible etiologies include infectious, metabolic, structural  -outpt w/u to eval for ALS/bekah disease (Dr. Carrasco), on home riluzole  -house neuro consulted, f/u recs  -c/w home keppra, oxcarbazepine, riluzole  -will obtain CTH to r/o stroke/structural abnormality for AMS    #Pulm  -acute on chronic hypercapneic respiratory failure, initial VBG pH 7.17, pCO2>110, bicarb 32; intubated in the ED. CXT no acute finding. Likely in the setting of motor neuron dysfunction. Considered infectious etiology but CXR neg and no reported cough, less likely at this time  -f/u repeat ABG, adjust vent setting as appropriate  -will do bedside US to eval     #CV  -on pressor in the setting of sedation  -wean as tolerated    #GI  -no active issue  -start tube feed    #Renal  -elevated bicarb, likely in the setting of chronic respiratory acidosis  -monitor UOP    #Endo  -FS q6h while npo on tube feed    #heme  -heparin subq    #ID  -no clear infectious symptoms  -will obtain UA  -CXR no acute finding  -monitor off antibiotics    #Ethics  -Full code

## 2021-07-04 NOTE — H&P ADULT - NSICDXFAMILYHX_GEN_ALL_CORE_FT
FAMILY HISTORY:  Father  Still living? Unknown  FH: ALS (amyotrophic lateral sclerosis), Age at diagnosis: Age Unknown    Sibling  Still living? Unknown  FH: breast cancer, Age at diagnosis: Age Unknown  FH: stroke, Age at diagnosis: Age Unknown

## 2021-07-04 NOTE — ED ADULT NURSE NOTE - CHIEF COMPLAINT QUOTE
pt c/o shortness of breath x1-2 weeks. Pt was found by family to be restless last night and when O2 was checked pt was found to have an O2 sat of 81% on room air. Pt 97% on room air. When EMS arrived pt had an O2 sat of 88%. Per EMS, pt has an ALS type of illness. Pt placed on 2L NC for comfort, pt having a hard time finishing sentences without becoming short of breath.

## 2021-07-04 NOTE — ED ADULT TRIAGE NOTE - CHIEF COMPLAINT QUOTE
pt c/o shortness of breath x1-2 weeks. Pt was found by family to be restless last night and when O2 was checked pt was found to have an O2 sat of 81% on room air. Pt 97% on room air. When EMS arrived pt had an O2 sat of 88%. Per EMS, pt has an ALS type of illness. Pt placed on 2L NC for comfort. pt c/o shortness of breath x1-2 weeks. Pt was found by family to be restless last night and when O2 was checked pt was found to have an O2 sat of 81% on room air. Pt 97% on room air. When EMS arrived pt had an O2 sat of 88%. Per EMS, pt has an ALS type of illness. Pt placed on 2L NC for comfort, pt having a hard time finishing sentences without becoming short of breath.

## 2021-07-04 NOTE — CONSULT NOTE ADULT - ATTENDING COMMENTS
Date of service: 7/5/2021    Patient seen and examined at bedside.     71 yo man followed by Dr. Carrasco (neurologist) for hx of seizure disorder (on keppra and carbamazepine), and currently being work up increased work of breathing with diaphragm weakness since 2/2021- concerning for possible motor neuron disease (? ALS, started on riluzole 1 month ago), p/w shortness of breath, hypoxia, hallucination and neck extension weakness and was electively intubated and admitted to ICU.     Patient intubated and sedation (propofol@45). Moving RUE spontaneously and reaching for the ET tube. Pupil pinpoint and reactive. Cough reflex present. Withdraws to pain in all extremities. Reflexes 2+ biceps and patellars with 4-5 beat clonus on the left. Muscle fasciculations noted over medial left thigh. Muscle atrophy noted involving intrinsic hand muscles and left thigh.     CT head no acute pathology  Wbc 10k>12K  B12 1134  UA neg infection  COVID PCR neg.    IMP: Acute on chronic respiratory failure with encephalopathy in the setting of possible motor neuron disease (UMN and LMN signs on exam)- ? ALS.   Acute decompensation and encephalopathy would not be typical for a motor neuron disease, recommend work up for a superimposed infectious/metabolic etiologies.     Would plan to obtain further collateral history and prior work up from Dr. Carrasco office.   Continue supportive management

## 2021-07-04 NOTE — ED PROVIDER NOTE - PHYSICAL EXAMINATION
Gen: ill-appearing  HEENT: EOMI, no nasal discharge, mucous membranes moist, head bent forward at neck   CV: regularly irregular, no M/R/G  Resp: greatly diminshed bs b/l   GI: Abdomen soft non-distended, NTTP, no masses  MSK: No open wounds, no bruising, no LE edema  Neuro: A&Ox3, following commands, moving all four extremities spontaneously  Skin: no lesions/rashes

## 2021-07-04 NOTE — ED PROVIDER NOTE - CLINICAL SUMMARY MEDICAL DECISION MAKING FREE TEXT BOX
70M w/ newly diagnosed ?ALS vs other neurodegenerative dx p/w respiratory failure. Hypercarbic, w/ worsening mental status, tachypneic, concern for progressive inability to ventilate despite appropriate sats. requested NIF w/ VC however anticipate intubation, ICU LOC.

## 2021-07-04 NOTE — ED PROVIDER NOTE - PROGRESS NOTE DETAILS
konrad:  confirmed error on initial gas - PCO2 >110 on initial gas rather than 24. konrad pgy3: on first assessment pt was tachypneic to low 30s, unable to pull >75cc on incentive spirometer. Discussion was had w/ wife, patient re: GOC, wife stated they had not discussed. Patient was not able to complete nif and became progressively altered - was maintaining sats. VBG w/ ph 7.17 - pt w/ apparent worsening hypercarbia. Attending discussed w/ wife and intubation was arranged, wife informed of potential inability to wean given progressive dx. Patient was intubated w/o complication however due to progressive bucking of vent on propofol fentanyl was added to sedation. konrad:  confirmed error on initial gas - PCO2 >110 on initial gas rather than <24.

## 2021-07-04 NOTE — ED ADULT NURSE NOTE - INTERVENTIONS DEFINITIONS
[place] to call system/Call bell, personal items and telephone within reach/Instruct patient to call for assistance/Room bathroom lighting operational/Non-slip footwear when patient is off stretcher/Physically safe environment: no spills, clutter or unnecessary equipment/Stretcher in lowest position, wheels locked, appropriate side rails in place/Provide visual cue, wrist band, yellow gown, etc./Monitor for mental status changes and reorient to person, place, and time/Provide visual clues: red socks

## 2021-07-04 NOTE — ED PROVIDER NOTE - ATTENDING CONTRIBUTION TO CARE
Seen and examined Seen and examined, accompanied by wife, c/o worsening weakness and shortness of breath for months, seen by Neuro and thought to have neuromuscular weakness, poss. ALS or similar, began Riluzole outpt. Pt. has not been able to lie flat due to resp. distress, and over past 2d breathing is much worse. Hypoxic on RA, to ED eyes open but dec. responsive and nearly non-verbal, also worsening in ED, eyes closing, poor prox muscle strength. MM dry, shallow resp, nearly no breath sounds, heart reg, abd soft, NT to palp, moves ext. minimally, poor  strength, no edema, good pulses, NT calves, CN 3-12 intact.

## 2021-07-05 NOTE — PROGRESS NOTE ADULT - SUBJECTIVE AND OBJECTIVE BOX
Sheryl Parkinson (Ariel)  PGY-3  NS: [phone]    INTERVAL HPI/OVERNIGHT EVENTS: CTH neg. Repleted phos.    SUBJECTIVE: Patient seen and examined at bedside. Unable to obtain ROS    OBJECTIVE:    VITAL SIGNS:  ICU Vital Signs Last 24 Hrs  T(C): 37.1 (2021 12:00), Max: 37.8 (2021 20:00)  T(F): 98.8 (2021 12:00), Max: 100.1 (2021 20:00)  HR: 83 (2021 12:00) (70 - 145)  BP: 100/69 (2021 12:00) (77/69 - 112/68)  BP(mean): 79 (2021 12:00) (53 - 82)  ABP: --  ABP(mean): --  RR: 10 (2021 12:00) (7 - 26)  SpO2: 100% (:00) (99% - 100%)    Mode: AC/ CMV (Assist Control/ Continuous Mandatory Ventilation), RR (machine): 10, TV (machine): 400, FiO2: 30, PEEP: 5, ITime: 0.9, MAP: 9, PIP: 27     @ 07:  -   @ 07:00  --------------------------------------------------------  IN: 2145.9 mL / OUT: 945 mL / NET: 1200.9 mL     @ 07: @ 12:40  --------------------------------------------------------  IN: 107.1 mL / OUT: 40 mL / NET: 67.1 mL      CAPILLARY BLOOD GLUCOSE          PHYSICAL EXAM:    General: NAD, intubated and sedated  HEENT: NC/AT; PERRL, clear conjunctiva  Neck: supple  Respiratory: CTA b/l  Cardiovascular: +S1/S2; RRR  Abdomen: soft, NT/ND; +BS x4  Extremities: WWP, 2+ peripheral pulses b/l; no LE edema  Skin: normal color and turgor; no rash    MEDICATIONS:  MEDICATIONS  (STANDING):  albuterol/ipratropium for Nebulization. 3 milliLiter(s) Nebulizer once  atorvastatin 40 milliGRAM(s) Oral at bedtime  chlorhexidine 0.12% Liquid 15 milliLiter(s) Oral Mucosa every 12 hours  chlorhexidine 4% Liquid 1 Application(s) Topical <User Schedule>  dexMEDEtomidine Infusion 0.2 MICROgram(s)/kG/Hr (3.57 mL/Hr) IV Continuous <Continuous>  donepezil 10 milliGRAM(s) Oral at bedtime  fentaNYL   Infusion. 0.5 MICROgram(s)/kG/Hr (3 mL/Hr) IV Continuous <Continuous>  heparin   Injectable 5000 Unit(s) SubCutaneous every 8 hours  ketamine Infusion. 0.25 mG/kG/Hr (1.78 mL/Hr) IV Continuous <Continuous>  latanoprost 0.005% Ophthalmic Solution 1 Drop(s) Both EYES at bedtime  levETIRAcetam 750 milliGRAM(s) Oral two times a day  norepinephrine Infusion 0.08 MICROgram(s)/kG/Min (9 mL/Hr) IV Continuous <Continuous>  OXcarbazepine 300 milliGRAM(s) Oral two times a day  propofol Infusion 50 MICROgram(s)/kG/Min (18 mL/Hr) IV Continuous <Continuous>  riluzole 50 milliGRAM(s) Oral <User Schedule>    MEDICATIONS  (PRN):      ALLERGIES:  Allergies    No Known Allergies    Intolerances        LABS:                        14.4   12.37 )-----------( 230      ( 2021 02:20 )             43.9     07-05    133<L>  |  94<L>  |  15  ----------------------------<  109<H>  3.9   |  30  |  0.89    Ca    9.2      2021 02:20  Phos  2.2     07-05  Mg     1.90     07-05    TPro  6.3  /  Alb  3.6  /  TBili  0.5  /  DBili  x   /  AST  38  /  ALT  30  /  AlkPhos  69  07-05    PT/INR - ( 2021 14:29 )   PT: 13.7 sec;   INR: 1.21 ratio         PTT - ( 2021 02:20 )  PTT:30.9 sec  Urinalysis Basic - ( 2021 18:18 )    Color: Yellow / Appearance: Clear / S.014 / pH: x  Gluc: x / Ketone: Small  / Bili: Negative / Urobili: <2 mg/dL   Blood: x / Protein: 30 mg/dL / Nitrite: Negative   Leuk Esterase: Negative / RBC: 11-25 /HPF / WBC 0-3 /HPF   Sq Epi: x / Non Sq Epi: 0-3 /HPF / Bacteria: Negative        RADIOLOGY & ADDITIONAL TESTS: Reviewed.

## 2021-07-05 NOTE — PROGRESS NOTE ADULT - ASSESSMENT
70M w/ PMHx seizures, HTN, and possible ALS presenting for weakness and shortness of breath, found to be hypoxic and intubated for airway protection in setting of poor inspiratory effort.       #Neuro  -Sedated on prop and fent, wean and switch to precedex (and ketamine if needed) for daily sedation vacations to assess neuro status and SBT as tolerated  -Encephalopathy possible etiologies include infectious, metabolic, structural  -outpt w/u to eval for ALS/bekah disease (Dr. Carrasco), on home riluzole  -house neuro consulted, f/u recs  -c/w home keppra, oxcarbazepine, riluzole  -CTH neg for acute pathology    #Pulm  -acute on chronic hypercapneic respiratory failure, initial VBG pH 7.17, pCO2>110, bicarb 32; intubated in the ED. CXT no acute finding. Likely in the setting of motor neuron dysfunction. Considered infectious etiology but CXR neg and no reported cough, less likely at this time  -f/u repeat ABG, adjust vent setting as appropriate  -Bedside US didn't show consolidation    #CV  -on pressor in the setting of sedation, slowly uptitrating for the past 24 hrs  -wean as tolerated    #GI  -no active issue  -c/w tube feed    #Renal  -elevated bicarb, likely in the setting of chronic respiratory acidosis  -monitor UOP  -GABINO--will check urine lytes for etiology, trend Cr    #Endo  -FS q6h while npo on tube feed    #heme  -heparin subq    #ID  -no clear infectious symptoms  -UA neg for infection  -CXR and bedside US no evidence of PNA  -will send blood culture  -monitor off antibiotics    #Ethics  -Full code

## 2021-07-05 NOTE — PROGRESS NOTE ADULT - ATTENDING COMMENTS
Acute on chronic hypercapnic respiratory failure in setting of recently diagnosed neuromuscular disease.  Remains with no clear inciting factor or sign of infection. Likely progression of disease.   SAT/SBT as tolerated. May need to consider trach if no significant improvement over next several days.

## 2021-07-06 NOTE — PROGRESS NOTE ADULT - ASSESSMENT
INCOMPLETE  71yo M w h/o seizures (on LEV & CBZ), HLD and currently being worked up for a neuromuscular disorder (?ALS) w/ diaphragmatic weakness since 02/2021 who hallucination, hypoxia and increased SOB requiring intubation.     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       Recommendations   - Labs: acetylcholine receptor antibody (binding, blocking and modulating), MUSK antibody, LRP4 antibody, Vitamin b12, Thiamine, Folate  - If encephalopathy continues despite supportive management, consider LP    - Reached out to Dr. Carrasco's office awaiting records.

## 2021-07-06 NOTE — PROGRESS NOTE ADULT - ASSESSMENT
70M w/ PMHx seizures, HTN, and possible ALS presenting for weakness and shortness of breath, found to be hypoxic and hypercapneic and intubated for airway protection in setting of poor inspiratory effort.       #Neuro  -now on precedex and ketamine, wean ketamine to assess neuro status and SBT as tolerated  -Encephalopathy possible etiologies include infectious, metabolic, structural (less likely given neg CTH)  -outpt w/u to eval for ALS/bekah disease (Dr. Carrasco), on home riluzole  -house neuro consulted, f/u recs  -c/w home keppra, oxcarbazepine, riluzole  -CTH neg for acute pathology    #Pulm  -acute on chronic hypercapneic respiratory failure, initial VBG pH 7.17, pCO2>110, bicarb 32; intubated in the ED. CXT no acute finding. Likely in the setting of motor neuron dysfunction. Considered infectious etiology but CXR neg and no reported cough, less likely at this time  -ABG this am  -Bedside US didn't show consolidation    #CV  -on pressor in the setting of sedation, slowly uptitrating for the past 24 hrs  -wean as tolerated    #GI  -no active issue  -c/w tube feed    #Renal  -elevated bicarb, likely in the setting of chronic respiratory acidosis  -monitor UOP  -GABINO--will check urine lytes for etiology, trend Cr    #Endo  -FS q6h while npo on tube feed    #heme  -heparin subq    #ID  -no clear infectious symptoms  -UA neg for infection  -CXR and bedside US no evidence of PNA  -will send blood culture  -monitor off antibiotics    #Ethics  -Full code

## 2021-07-06 NOTE — CONSULT NOTE ADULT - SUBJECTIVE AND OBJECTIVE BOX
NEPHROLOGY - NSN    Patient seen and examined.    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 Patient started having difficulty with breathing back in 2021, underwent cardiopulmonary evaluation where it was determined that his diaphragm was weak. He is followed by outpatient neuro (Dr. Carrasco) who is evaluating patient for possible ALS though most of the workup including EMG was negative thus far. Wife reports that he might have a variant of ALS and that he is to seek second opinion at the end of this month. Wife states that Dr. Carrasco is working him up for bekah disease. About one month ago, patient was started on Riluzole. Pt has a history of seizures treated with Keppra and Oxcarbazepine, his last seizure was over 10 years ago. This morning, patient was found to be increasingly hypoxic to the low 90s, was told to come to ED by his PMDs office. While in the ED, he started having increasing work of breathing, was not able to pull any volumes on incentive spirometer or participate in a NIF. His mental status acutely decompensated and patient was intubated for airway protection and brought to the MICU.  (2021 11:56)      PAST MEDICAL & SURGICAL HISTORY:  Seizure    History of neuromuscular disorder    Status post meniscectomy        MEDICATIONS  (STANDING):  albuterol/ipratropium for Nebulization. 3 milliLiter(s) Nebulizer once  atorvastatin 40 milliGRAM(s) Oral at bedtime  chlorhexidine 0.12% Liquid 15 milliLiter(s) Oral Mucosa every 12 hours  chlorhexidine 4% Liquid 1 Application(s) Topical <User Schedule>  dexMEDEtomidine Infusion 0.2 MICROgram(s)/kG/Hr (3.57 mL/Hr) IV Continuous <Continuous>  donepezil 10 milliGRAM(s) Oral at bedtime  heparin   Injectable 5000 Unit(s) SubCutaneous every 8 hours  ketamine Infusion. 0.25 mG/kG/Hr (1.78 mL/Hr) IV Continuous <Continuous>  latanoprost 0.005% Ophthalmic Solution 1 Drop(s) Both EYES at bedtime  levETIRAcetam 750 milliGRAM(s) Oral two times a day  norepinephrine Infusion 0.08 MICROgram(s)/kG/Min (9 mL/Hr) IV Continuous <Continuous>  OXcarbazepine 300 milliGRAM(s) Oral two times a day  piperacillin/tazobactam IVPB.. 3.375 Gram(s) IV Intermittent every 8 hours  riluzole 50 milliGRAM(s) Oral <User Schedule>      Allergies    No Known Allergies    Intolerances        SOCIAL HISTORY:  Denies alcohol abuse, drug abuse or tobacco usage.     FAMILY HISTORY:  FH: ALS (amyotrophic lateral sclerosis) (Father)    FH: stroke (Sibling)    FH: breast cancer (Sibling)        VITALS:  T(C): 37.6 (21 @ 08:00), Max: 38.9 (21 @ 00:00)  HR: 75 (21 @ 10:47) (65 - 145)  BP: 96/66 (21 @ 10:15) (80/63 - 127/73)  RR: 14 (21 @ 10:15) (0 - 26)  SpO2: 96% (21 @ 10:47) (92% - 100%)    REVIEW OF SYSTEMS:    PHYSICAL EXAM:  Constitutional: NAD  HEENT: EOMI  Neck:  No JVD, supple   Respiratory: CTA B/L  Cardiovascular: S1 and S2, RRR  Gastrointestinal: + BS, soft, NT, ND  Extremities: No peripheral edema, + peripheral pulses  Neurological: A/O x 3, CN2-12 intact  Psychiatric: Normal mood, normal affect  : No Wan  Skin: No rashes, C/D/I  Access: Not applicable    I and O's:     @ :  -   @ 07:00  --------------------------------------------------------  IN: 2145.9 mL / OUT: 945 mL / NET: 1200.9 mL     @ :  -   @ 07:00  --------------------------------------------------------  IN: 3424.4 mL / OUT: 795 mL / NET: 2629.4 mL     @ 07:01  -   @ 10:49  --------------------------------------------------------  IN: 418.3 mL / OUT: 160 mL / NET: 258.3 mL          LABS:                        12.2   13.10 )-----------( 186      ( 2021 05:08 )             36.7     07-06    134<L>  |  100  |  14  ----------------------------<  133<H>  4.3   |  26  |  0.67    Ca    8.3<L>      2021 05:08  Phos  2.7     -  Mg     1.80         TPro  5.4<L>  /  Alb  2.9<L>  /  TBili  0.4  /  DBili  x   /  AST  26  /  ALT  23  /  AlkPhos  63  -      URINE:  Urinalysis Basic - ( 2021 21:04 )    Color: Yellow / Appearance: Clear / S.018 / pH: x  Gluc: x / Ketone: Negative  / Bili: Negative / Urobili: <2 mg/dL   Blood: x / Protein: Trace / Nitrite: Positive   Leuk Esterase: Small / RBC: 5 /HPF / WBC 10 /HPF   Sq Epi: x / Non Sq Epi: 0 /HPF / Bacteria: Many      Creatinine, Random Urine: 173 mg/dL ( @ 17:54)  Sodium, Random Urine: <20 mmol/L ( @ 17:54)    RADIOLOGY & ADDITIONAL STUDIES:     NEPHROLOGY - NSN    Patient seen and examined.    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 Patient started having difficulty with breathing back in 2021, underwent cardiopulmonary evaluation where it was determined that his diaphragm was weak. He is followed by outpatient neuro (Dr. Carrasco) who is evaluating patient for possible ALS though most of the workup including EMG was negative thus far. Wife reports that he might have a variant of ALS and that he is to seek second opinion at the end of this month. Wife states that Dr. Carrasco is working him up for bekah disease. About one month ago, patient was started on Riluzole. Pt has a history of seizures treated with Keppra and Oxcarbazepine, his last seizure was over 10 years ago. This morning, patient was found to be increasingly hypoxic to the low 90s, was told to come to ED by his PMDs office. While in the ED, he started having increasing work of breathing, was not able to pull any volumes on incentive spirometer or participate in a NIF. His mental status acutely decompensated and patient was intubated for airway protection and brought to the MICU.  (2021 11:56)  Pt had CO2 retention and now intubated.  Was on Ketamine and now off pending possible extubation   During his course in the MICU he did have oliguria n renal eval was called   Off pressors       PAST MEDICAL & SURGICAL HISTORY:  Seizure    History of neuromuscular disorder    Status post meniscectomy        MEDICATIONS  (STANDING):  albuterol/ipratropium for Nebulization. 3 milliLiter(s) Nebulizer once  atorvastatin 40 milliGRAM(s) Oral at bedtime  chlorhexidine 0.12% Liquid 15 milliLiter(s) Oral Mucosa every 12 hours  chlorhexidine 4% Liquid 1 Application(s) Topical <User Schedule>  dexMEDEtomidine Infusion 0.2 MICROgram(s)/kG/Hr (3.57 mL/Hr) IV Continuous <Continuous>  donepezil 10 milliGRAM(s) Oral at bedtime  heparin   Injectable 5000 Unit(s) SubCutaneous every 8 hours  ketamine Infusion. 0.25 mG/kG/Hr (1.78 mL/Hr) IV Continuous <Continuous>  latanoprost 0.005% Ophthalmic Solution 1 Drop(s) Both EYES at bedtime  levETIRAcetam 750 milliGRAM(s) Oral two times a day  norepinephrine Infusion 0.08 MICROgram(s)/kG/Min (9 mL/Hr) IV Continuous <Continuous>  OXcarbazepine 300 milliGRAM(s) Oral two times a day  piperacillin/tazobactam IVPB.. 3.375 Gram(s) IV Intermittent every 8 hours  riluzole 50 milliGRAM(s) Oral <User Schedule>      Allergies    No Known Allergies    Intolerances        SOCIAL HISTORY:  Denies alcohol abuse, drug abuse or tobacco usage.     FAMILY HISTORY:  FH: ALS (amyotrophic lateral sclerosis) (Father)    FH: stroke (Sibling)    FH: breast cancer (Sibling)        VITALS:  T(C): 37.6 (21 @ 08:00), Max: 38.9 (21 @ 00:00)  HR: 75 (21 @ 10:47) (65 - 145)  BP: 96/66 (21 @ 10:15) (80/63 - 127/73)  RR: 14 (21 @ 10:15) (0 - 26)  SpO2: 96% (21 @ 10:47) (92% - 100%)    REVIEW OF SYSTEMS:    PHYSICAL EXAM:  Constitutional: intubated   HEENT: EOMI  Neck:  No JVD, supple   Respiratory: CTA B/L  Cardiovascular: S1 and S2, RRR  Gastrointestinal: + BS, soft, NT, ND  Extremities: No peripheral edema, + peripheral pulses  Neurological: unable   : No Wan  Skin: No rashes, C/D/I  Access: Not applicable    I and O's:     @ 07:  -   @ 07:00  --------------------------------------------------------  IN: 2145.9 mL / OUT: 945 mL / NET: 1200.9 mL     @ 07:01  -   @ 07:00  --------------------------------------------------------  IN: 3424.4 mL / OUT: 795 mL / NET: 2629.4 mL     @ 07:01  -   @ 10:49  --------------------------------------------------------  IN: 418.3 mL / OUT: 160 mL / NET: 258.3 mL          LABS:                        12.2   13.10 )-----------( 186      ( 2021 05:08 )             36.7         134<L>  |  100  |  14  ----------------------------<  133<H>  4.3   |  26  |  0.67    Ca    8.3<L>      2021 05:08  Phos  2.7       Mg     1.80         TPro  5.4<L>  /  Alb  2.9<L>  /  TBili  0.4  /  DBili  x   /  AST  26  /  ALT  23  /  AlkPhos  63        URINE:  Urinalysis Basic - ( 2021 21:04 )    Color: Yellow / Appearance: Clear / S.018 / pH: x  Gluc: x / Ketone: Negative  / Bili: Negative / Urobili: <2 mg/dL   Blood: x / Protein: Trace / Nitrite: Positive   Leuk Esterase: Small / RBC: 5 /HPF / WBC 10 /HPF   Sq Epi: x / Non Sq Epi: 0 /HPF / Bacteria: Many      Creatinine, Random Urine: 173 mg/dL ( @ 17:54)  Sodium, Random Urine: <20 mmol/L ( @ 17:54)    RADIOLOGY & ADDITIONAL STUDIES:

## 2021-07-06 NOTE — PROGRESS NOTE ADULT - SUBJECTIVE AND OBJECTIVE BOX
SUBJECTIVE/INTERVAL EVENTS/HOSPITAL COURSE UPDATES:    Home Medications:  DONEPEZIL    TAB 10M:  (04 Jul 2021 14:22)  LATANOPROST 0.005% EYE DROPS: INSTILL 1 DROP IN EACH EYE AT BEDTIME (04 Jul 2021 14:22)  levETIRAcetam 750 mg oral tablet: 1 tab(s) orally 2 times a day (04 Jul 2021 14:22)  OXcarbazepine 300 mg oral tablet: 1 tab(s) orally 2 times a day (04 Jul 2021 14:22)  RILUZOLE 50 MG TABLET: TAKE 1 TABLET BY MOUTH EVERY DAY (04 Jul 2021 14:22)  SIMVASTATIN  TAB 10M:  (04 Jul 2021 14:22)      MEDICATIONS  (STANDING):  albuterol/ipratropium for Nebulization. 3 milliLiter(s) Nebulizer once  atorvastatin 40 milliGRAM(s) Oral at bedtime  chlorhexidine 4% Liquid 1 Application(s) Topical <User Schedule>  dexMEDEtomidine Infusion 0.2 MICROgram(s)/kG/Hr (3.57 mL/Hr) IV Continuous <Continuous>  donepezil 10 milliGRAM(s) Oral at bedtime  heparin   Injectable 5000 Unit(s) SubCutaneous every 8 hours  latanoprost 0.005% Ophthalmic Solution 1 Drop(s) Both EYES at bedtime  levETIRAcetam 750 milliGRAM(s) Oral two times a day  OXcarbazepine 300 milliGRAM(s) Oral two times a day  piperacillin/tazobactam IVPB.. 3.375 Gram(s) IV Intermittent every 8 hours  riluzole 50 milliGRAM(s) Oral <User Schedule>    MEDICATIONS  (PRN):      OBJECTIVE:  VITAL SIGNS:   Vital Signs Last 24 Hrs  T(C): 37.2 (06 Jul 2021 20:00), Max: 38.9 (06 Jul 2021 00:00)  T(F): 98.9 (06 Jul 2021 20:00), Max: 102.1 (06 Jul 2021 00:00)  HR: 121 (06 Jul 2021 19:00) (65 - 121)  BP: 102/69 (06 Jul 2021 19:00) (87/64 - 153/81)  BP(mean): 81 (06 Jul 2021 19:00) (65 - 116)  RR: 34 (06 Jul 2021 19:00) (0 - 36)  SpO2: 95% (06 Jul 2021 19:00) (92% - 100%)    EXAM  Mental Status: AxO to person, place, situation, time.   Language: Fluent with preserved naming, repetition and comprehension.  Follows all commands.  Cranial Nerves: PERRL (R = 3mm, L = 3mm).  EOMI no nystagmus. V1-3 intact to light touch.  No facial asymmetry b/l.  Hearing grossly normal to conversation.  Speech clear.  Symmetric palate elevation in midline.  Tongue midline, normal movements.  Cortical: Visual fields intact.  No extinction on double simultaneous touch and no signs of neglect.   Motor: Normal muscle bulk & tone.  ***/5 strength.	                                                                                          Left              Right  Shoulder abduction; axillary (C5/C6)                        /5                 /5   Elbow flexion; musculocutaneous (C5/C6)              /5                 /5   Elbow extension; radial (C6/C7/C8)                          /5                 /5   Wrist extension; radial (C5/6)                                    /5                 /5   Wrist flexion; ulnar & median (C7/C8/T1)               /5                 /5   Digit extension; radial (C7/C8)                                   /5                 /5   Digit abduction; ulnar(C8/T1)                                     /5                 /5  Hip flexion; femoral (L1/L2)                                        /5                 /5   Hip adduction; obturator (L2/L3)                               /5                 /5   Hip abduction; superior gluteal(L4/L5)                     /5                 /5   Knee flexion sciatic (L5/S1)                                         /5                 /5   Knee extension; femoral (L3/L4)                                /5                 /5   Dorsiflexion; deep peroneal(L4/L5)                           /5                 /5   Plantarflexion; tibial (S1/S2)                                       /5                 /5   Eversion; superficial peroneal (L5/S1)                      /5                 /5                                         Inversion; tibial (L4/L5)                                                /5                 /5     Sensation: Symmetric B/L preserved sensation to light touch, temperature, proprioception..      Reflexes: ***/4.  Plantar Responses are ***.   Coordination: Intact rapid-alternating movements. No dysmetria on finger-to-nose or heel-to-shin.  No dysdiadodyskinesia  Gait: Normal stance, stride length, touch off, arm swing and trish.   Normal Romberg. No postural instability.       LABS AND STUDIES:                        12.2   13.10 )-----------( 186      ( 06 Jul 2021 05:08 )             36.7     07-06    134<L>  |  100  |  14  ----------------------------<  133<H>  4.3   |  26  |  0.67    Ca    8.3<L>      06 Jul 2021 05:08  Phos  2.7     07-06  Mg     1.80     07-06    TPro  5.4<L>  /  Alb  2.9<L>  /  TBili  0.4  /  DBili  x   /  AST  26  /  ALT  23  /  AlkPhos  63  07-06    LIVER FUNCTIONS - ( 06 Jul 2021 05:08 )  Alb: 2.9 g/dL / Pro: 5.4 g/dL / ALK PHOS: 63 U/L / ALT: 23 U/L / AST: 26 U/L / GGT: x           PTT - ( 05 Jul 2021 02:20 )  PTT:30.9 sec          CSF Results:       IMAGING/STUDIES    CT Head No Cont:  (07-05)

## 2021-07-06 NOTE — PROGRESS NOTE ADULT - SUBJECTIVE AND OBJECTIVE BOX
Sheryl Parkinson (Ariel)  PGY-3  NS: [phone]    INTERVAL HPI/OVERNIGHT EVENTS: fever 102.1, UA positive. Started on zosyn    SUBJECTIVE: Patient seen and examined at bedside. Unable to obtain due to mental status.    OBJECTIVE:    VITAL SIGNS:  ICU Vital Signs Last 24 Hrs  T(C): 37.6 (2021 08:00), Max: 38.9 (2021 00:00)  T(F): 99.7 (2021 08:00), Max: 102.1 (2021 00:00)  HR: 76 (2021 11:00) (65 - 110)  BP: 90/65 (:00) (80/63 - 127/73)  BP(mean): 74 (:) (69 - 92)  ABP: --  ABP(mean): --  RR: 14 (2021 11:) (0 - 26)  SpO2: 98% (:00) (92% - 100%)    Mode: AC/ CMV (Assist Control/ Continuous Mandatory Ventilation), RR (machine): 14, TV (machine): 400, FiO2: 30, PEEP: 5, ITime: 0.92, MAP: 8, PIP: 20     @ 07: @ 07:00  --------------------------------------------------------  IN: 3424.4 mL / OUT: 795 mL / NET: 2629.4 mL     @ 07: @ 11:47  --------------------------------------------------------  IN: 419.2 mL / OUT: 160 mL / NET: 259.2 mL      CAPILLARY BLOOD GLUCOSE      POCT Blood Glucose.: 123 mg/dL (2021 00:31)      PHYSICAL EXAM:    General: NAD, intubated and sedated  HEENT: NC/AT; PERRL, clear conjunctiva  Neck: supple  Respiratory: CTA b/l  Cardiovascular: +S1/S2; RRR  Abdomen: soft, NT/ND; +BS x4  Extremities: WWP, 2+ peripheral pulses b/l; no LE edema  Skin: normal color and turgor; no rash    MEDICATIONS:  MEDICATIONS  (STANDING):  albuterol/ipratropium for Nebulization. 3 milliLiter(s) Nebulizer once  atorvastatin 40 milliGRAM(s) Oral at bedtime  chlorhexidine 0.12% Liquid 15 milliLiter(s) Oral Mucosa every 12 hours  chlorhexidine 4% Liquid 1 Application(s) Topical <User Schedule>  dexMEDEtomidine Infusion 0.2 MICROgram(s)/kG/Hr (3.57 mL/Hr) IV Continuous <Continuous>  donepezil 10 milliGRAM(s) Oral at bedtime  heparin   Injectable 5000 Unit(s) SubCutaneous every 8 hours  ketamine Infusion. 0.25 mG/kG/Hr (1.78 mL/Hr) IV Continuous <Continuous>  latanoprost 0.005% Ophthalmic Solution 1 Drop(s) Both EYES at bedtime  levETIRAcetam 750 milliGRAM(s) Oral two times a day  norepinephrine Infusion 0.08 MICROgram(s)/kG/Min (9 mL/Hr) IV Continuous <Continuous>  OXcarbazepine 300 milliGRAM(s) Oral two times a day  piperacillin/tazobactam IVPB.. 3.375 Gram(s) IV Intermittent every 8 hours  riluzole 50 milliGRAM(s) Oral <User Schedule>    MEDICATIONS  (PRN):      ALLERGIES:  Allergies    No Known Allergies    Intolerances        LABS:                        12.2   13.10 )-----------( 186      ( 2021 05:08 )             36.7     07-06    134<L>  |  100  |  14  ----------------------------<  133<H>  4.3   |  26  |  0.67    Ca    8.3<L>      2021 05:08  Phos  2.7     07-06  Mg     1.80     07-06    TPro  5.4<L>  /  Alb  2.9<L>  /  TBili  0.4  /  DBili  x   /  AST  26  /  ALT  23  /  AlkPhos  63  07-06    PT/INR - ( 2021 14:29 )   PT: 13.7 sec;   INR: 1.21 ratio         PTT - ( 2021 02:20 )  PTT:30.9 sec  Urinalysis Basic - ( 2021 21:04 )    Color: Yellow / Appearance: Clear / S.018 / pH: x  Gluc: x / Ketone: Negative  / Bili: Negative / Urobili: <2 mg/dL   Blood: x / Protein: Trace / Nitrite: Positive   Leuk Esterase: Small / RBC: 5 /HPF / WBC 10 /HPF   Sq Epi: x / Non Sq Epi: 0 /HPF / Bacteria: Many        RADIOLOGY & ADDITIONAL TESTS: Reviewed.

## 2021-07-06 NOTE — CONSULT NOTE ADULT - ASSESSMENT
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   Respiratory failure with CO2 retention   Hypotension and was on pressors  GABINO (YAMILE criteria of serum creatinine > 0.3 elevation with reduced urine output)    1 Renal- Renal function seems to be improving now-- Likely exacerbated by the pressors;  The hyponatremia is of little significance at present   Daily SMA7;  No need for renal sono  2 Pulm-Hopefully extubated later today;  Off sedation   3 Neuro- Workup for ALS in progress(but thus far the workup is negative)    Sayed [facility]   [phone]

## 2021-07-06 NOTE — PROGRESS NOTE ADULT - ATTENDING COMMENTS
70 M with seizures, possible ALS here with sob/weakness, found to have acute hypoxemic and hypercapnic respiratory failure of unclear etiology.    Will wean ketamine today and start PS trials with precedex on board  had temperature overnight, started on zosyn, f/u cultures  need some collateral info from neuro and his neurologist re: underlying disease (?ALS or variant and/or underlying undiagnosed BART/OHS)

## 2021-07-06 NOTE — PATIENT PROFILE ADULT - NSASFALLWHENOCCURRED_GEN_A_NUR
6/4/21 Fell moving from 1 couch to the other in his living room. No trauma as per patient/last six months

## 2021-07-07 NOTE — CHART NOTE - NSCHARTNOTEFT_GEN_A_CORE
Critically ill patient requiring ABG to determine respiratory status.  This was an emergent procedure.  Patients R radial artery was visualized with US and cleaned with alcohol pad.   23g x 3/4" x 12" butterfly needed was inserted into the left/right radial artery with pulsatile flash.  One attempt was performed 3cc of blood was obtained from patient.  Gauze pad was placed over site, needle withdrawn and firm pressure was held until complete hemostasis was achieved and band-aid was applied.  Patient tolerated procedure well with no complications.        Ingris Pavon PA-C  [facility] [id number]

## 2021-07-07 NOTE — DIETITIAN INITIAL EVALUATION ADULT. - PERTINENT MEDS FT
MEDICATIONS  (STANDING):  atorvastatin 40 milliGRAM(s) Oral at bedtime  chlorhexidine 4% Liquid 1 Application(s) Topical <User Schedule>  dexMEDEtomidine Infusion 0.2 MICROgram(s)/kG/Hr (3.57 mL/Hr) IV Continuous <Continuous>  donepezil 10 milliGRAM(s) Oral at bedtime  heparin   Injectable 5000 Unit(s) SubCutaneous every 8 hours  latanoprost 0.005% Ophthalmic Solution 1 Drop(s) Both EYES at bedtime  levETIRAcetam  Solution 750 milliGRAM(s) Oral two times a day  midodrine 5 milliGRAM(s) Oral once  norepinephrine Infusion 0.05 MICROgram(s)/kG/Min (6.68 mL/Hr) IV Continuous <Continuous>  OXcarbazepine 300 milliGRAM(s) Oral two times a day  piperacillin/tazobactam IVPB.. 3.375 Gram(s) IV Intermittent every 8 hours  riluzole 50 milliGRAM(s) Oral <User Schedule>

## 2021-07-07 NOTE — DIETITIAN INITIAL EVALUATION ADULT. - OTHER INFO
Pt. w PMH seizures, HTN, possible ALS presenting for weakness and SOB found to be hypoxic and hypercapnic, thus intubated for airway protection.  Was previously receiving enteral feeding via OGT (7/4-7/6) without any noted intolerance.  TF d/c'd as Pt. s/p extubation (7/6).    Spoke with RN.      Pt. currently on continuous BiPAP @ time of RDN encounter, hence remains NPO.  Wife present @ bedside.  Pt. denies food allergies, nausea/vomiting/diarrhea/constipation, or any issues with chewing/swallowing PTA.  Last BM (7/7).    Wife reports Pt. with persistent weight decrease x 1 year from usual body weight of 200lbs--->168lbs most recently in Feb 2021.  Current admission weight 157.1lbs suggestive of even further decrease.  Height not documented.

## 2021-07-07 NOTE — PROGRESS NOTE ADULT - SUBJECTIVE AND OBJECTIVE BOX
NEPHROLOGY-[facility] [phone]        Patient seen and examined in bed.  Off pressors         MEDICATIONS  (STANDING):  atorvastatin 40 milliGRAM(s) Oral at bedtime  chlorhexidine 4% Liquid 1 Application(s) Topical <User Schedule>  dexMEDEtomidine Infusion 0.2 MICROgram(s)/kG/Hr (3.57 mL/Hr) IV Continuous <Continuous>  donepezil 10 milliGRAM(s) Oral at bedtime  heparin   Injectable 5000 Unit(s) SubCutaneous every 8 hours  latanoprost 0.005% Ophthalmic Solution 1 Drop(s) Both EYES at bedtime  levETIRAcetam  Solution 750 milliGRAM(s) Oral two times a day  midodrine 5 milliGRAM(s) Oral once  norepinephrine Infusion 0.05 MICROgram(s)/kG/Min (6.68 mL/Hr) IV Continuous <Continuous>  OXcarbazepine 300 milliGRAM(s) Oral two times a day  piperacillin/tazobactam IVPB.. 3.375 Gram(s) IV Intermittent every 8 hours  riluzole 50 milliGRAM(s) Oral <User Schedule>      VITAL:  T(C): , Max: 37.7 (21 @ 12:00)  T(F): , Max: 99.9 (21 @ 12:00)  HR: 78 (21 @ 10:33)  BP: 139/83 (21 @ 10:00)  BP(mean): 99 (21 @ 10:00)  RR: 34 (21 @ 10:00)  SpO2: 98% (21 @ 10:33)  Wt(kg): --    I and O's:     @ 07:01  -   @ 07:00  --------------------------------------------------------  IN: 1047.2 mL / OUT: 620 mL / NET: 427.2 mL          PHYSICAL EXAM:    Constitutional: NAD  Neck:  No JVD  Respiratory: CTAB/L  Cardiovascular: S1 and S2  Gastrointestinal: BS+, soft, NT/ND  Extremities: No peripheral edema  Neurological: A/O x 3, no focal deficits  Psychiatric: Normal mood, normal affect  : No Wan  Skin: No rashes  Access: Not applicable    LABS:                        11.4   13.26 )-----------( 148      ( 2021 01:07 )             35.1     -    135  |  100  |  18  ----------------------------<  101<H>  4.1   |  24  |  0.68    Ca    8.8      2021 01:07  Phos  3.0     -  Mg     2.00         TPro  5.7<L>  /  Alb  2.8<L>  /  TBili  0.4  /  DBili  x   /  AST  23  /  ALT  20  /  AlkPhos  50            Urine Studies:  Urinalysis Basic - ( 2021 21:04 )    Color: Yellow / Appearance: Clear / S.018 / pH: x  Gluc: x / Ketone: Negative  / Bili: Negative / Urobili: <2 mg/dL   Blood: x / Protein: Trace / Nitrite: Positive   Leuk Esterase: Small / RBC: 5 /HPF / WBC 10 /HPF   Sq Epi: x / Non Sq Epi: 0 /HPF / Bacteria: Many      Creatinine, Random Urine: 173 mg/dL ( @ 17:54)  Sodium, Random Urine: <20 mmol/L ( @ 17:54)        RADIOLOGY & ADDITIONAL STUDIES:             NEPHROLOGY-[facility] [phone]        Patient seen and examined in bed.  Off pressors   Extubated and on Bipap        MEDICATIONS  (STANDING):  atorvastatin 40 milliGRAM(s) Oral at bedtime  chlorhexidine 4% Liquid 1 Application(s) Topical <User Schedule>  dexMEDEtomidine Infusion 0.2 MICROgram(s)/kG/Hr (3.57 mL/Hr) IV Continuous <Continuous>  donepezil 10 milliGRAM(s) Oral at bedtime  heparin   Injectable 5000 Unit(s) SubCutaneous every 8 hours  latanoprost 0.005% Ophthalmic Solution 1 Drop(s) Both EYES at bedtime  levETIRAcetam  Solution 750 milliGRAM(s) Oral two times a day  midodrine 5 milliGRAM(s) Oral once  norepinephrine Infusion 0.05 MICROgram(s)/kG/Min (6.68 mL/Hr) IV Continuous <Continuous>  OXcarbazepine 300 milliGRAM(s) Oral two times a day  piperacillin/tazobactam IVPB.. 3.375 Gram(s) IV Intermittent every 8 hours  riluzole 50 milliGRAM(s) Oral <User Schedule>      VITAL:  T(C): , Max: 37.7 (21 @ 12:00)  T(F): , Max: 99.9 (21 @ 12:00)  HR: 78 (21 @ 10:33)  BP: 139/83 (21 @ 10:00)  BP(mean): 99 (21 @ 10:00)  RR: 34 (21 @ 10:00)  SpO2: 98% (21 @ 10:33)  Wt(kg): --    I and O's:     @ 07:01  -   @ 07:00  --------------------------------------------------------  IN: 1047.2 mL / OUT: 620 mL / NET: 427.2 mL          PHYSICAL EXAM:    Constitutional: NAD  Neck:  No JVD  Respiratory: CTAB/L  Cardiovascular: S1 and S2  Gastrointestinal: BS+, soft, NT/ND  Extremities: No peripheral edema  Neurological: A/O x 3, no focal deficits  Psychiatric: Normal mood, normal affect  : No Wan  Skin: No rashes  Access: Not applicable    LABS:                        11.4   13.26 )-----------( 148      ( 2021 01:07 )             35.1         135  |  100  |  18  ----------------------------<  101<H>  4.1   |  24  |  0.68    Ca    8.8      2021 01:07  Phos  3.0       Mg     2.00         TPro  5.7<L>  /  Alb  2.8<L>  /  TBili  0.4  /  DBili  x   /  AST  23  /  ALT  20  /  AlkPhos  50            Urine Studies:  Urinalysis Basic - ( 2021 21:04 )    Color: Yellow / Appearance: Clear / S.018 / pH: x  Gluc: x / Ketone: Negative  / Bili: Negative / Urobili: <2 mg/dL   Blood: x / Protein: Trace / Nitrite: Positive   Leuk Esterase: Small / RBC: 5 /HPF / WBC 10 /HPF   Sq Epi: x / Non Sq Epi: 0 /HPF / Bacteria: Many      Creatinine, Random Urine: 173 mg/dL ( @ 17:54)  Sodium, Random Urine: <20 mmol/L ( @ 17:54)        RADIOLOGY & ADDITIONAL STUDIES:

## 2021-07-07 NOTE — PROGRESS NOTE ADULT - ASSESSMENT
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   Respiratory failure with CO2 retention   Hypotension and was on pressors  GABINO (YAMILE criteria of serum creatinine > 0.3 elevation with reduced urine output)    1 Renal- Renal function seems to be improving now-- Daily SMA7;  No need for renal sono  2 Pulm-On Bipap and when off engage in incentive spirometry   3 Neuro- Workup for ALS in progress(but thus far the workup is negative)  4 ID-IV abx     DW Wife in detail     [facility]   [phone]

## 2021-07-07 NOTE — DIETITIAN INITIAL EVALUATION ADULT. - ORAL INTAKE PTA/DIET HISTORY
Typically consumes 2-3 meals/day which are home prepared or dining out/take out.    Drinks Sprite throughout the day.  Also consumes Ensure supplement 1 bottle/day.  Overall decrease in appetite/PO intake x 1 year.

## 2021-07-07 NOTE — PROGRESS NOTE ADULT - ASSESSMENT
70M w/ PMHx seizures, HTN, and possible ALS presenting for weakness and shortness of breath, found to be hypoxic and hypercapneic and intubated for airway protection in setting of poor inspiratory effort.       #Neuro  -now on precedex and ketamine, wean ketamine to assess neuro status and SBT as tolerated  -Encephalopathy possible etiologies include infectious, metabolic, structural (less likely given neg CTH)  -outpt w/u to eval for ALS/bekah disease (Dr. Carrasco), on home riluzole  -house neuro consulted, f/u recs  -c/w home keppra, oxcarbazepine, riluzole  -CTH neg for acute pathology    #Pulm  -acute on chronic hypercapneic respiratory failure, initial VBG pH 7.17, pCO2>110, bicarb 32; intubated in the ED. CXT no acute finding. Likely in the setting of motor neuron dysfunction. Considered infectious etiology but CXR neg and no reported cough, less likely at this time  -ABG this am  -Bedside US didn't show consolidation    #CV  -on pressor in the setting of sedation, slowly uptitrating for the past 24 hrs  -wean as tolerated    #GI  -no active issue  -c/w tube feed    #Renal  -elevated bicarb, likely in the setting of chronic respiratory acidosis  -monitor UOP  -GABINO--will check urine lytes for etiology, trend Cr    #Endo  -FS q6h while npo on tube feed    #heme  -heparin subq    #ID  -no clear infectious symptoms  -UA neg for infection  -CXR and bedside US no evidence of PNA  -will send blood culture  -monitor off antibiotics    #Ethics  -Full code    70M w/ PMHx seizures, HTN, and possible ALS presenting for weakness and shortness of breath, found to be hypoxic and hypercapneic and intubated for airway protection in setting of poor inspiratory effort.     #Neuro  -wean precedex (on for anxiolytic)  -outpt w/u to eval for ALS/bkeah disease (Dr. Carrasco), on home riluzole  -house neuro consulted, f/u recs  -c/w home keppra, oxcarbazepine, riluzole  -CTH neg for acute pathology  -PT eval    #Pulm  -acute on chronic hypercapneic respiratory failure, initial VBG pH 7.17, pCO2>110, bicarb 32; intubated in the ED. CXT no acute finding. Likely in the setting of motor neuron dysfunction. Considered infectious etiology but CXR neg and no reported cough, less likely at this time  -Bedside US didn't show consolidation  -trial highflow NC today and repeat ABG, if not retaining pCO2 then can do highflow NC during the day and nocturnal avap/bipap    #CV  -levophed restarted overnight for hypotension, now weaned off  -continue to monitor  -trial midodrine 5 for hypotension    #GI  -no active issue  -NPO except med at this time. If stable on highflow and deemed low risk for re-intubation, then can do bedside swallow and start diet    #Renal  -elevated bicarb on admission, likely in the setting of chronic respiratory acidosis, now resolved  -monitor UOP    #Endo  -FS q6h while npo on tube feed    #heme  -heparin subq    #ID  -UA neg on admission now +, started on zosyn, continue for now and adjust based on sensitivities  -CXR and bedside US no evidence of PNA  -blood culture neg so far, f/u final results    #Ethics  -Full code

## 2021-07-07 NOTE — CHART NOTE - NSCHARTNOTEFT_GEN_A_CORE
: Shanice    INDICATION: Hypoxemia, hypercarbia    PROCEDURE:  [ ] LIMITED ECHO  [x] LIMITED CHEST  [ ] LIMITED RETROPERITONEAL  [ ] LIMITED ABDOMINAL  [ ] LIMITED DVT  [ ] NEEDLE GUIDANCE VASCULAR  [ ] NEEDLE GUIDANCE THORACENTESIS  [ ] NEEDLE GUIDANCE PARACENTESIS  [ ] NEEDLE GUIDANCE PERICARDIOCENTESIS  [ ] OTHER    FINDINGS:  A-line predominant, curtain sign present over lungs    INTERPRETATION:  Positive diaphragmatic movement; clear lungs without large consolidation or effusion    Suleiman Prasad MD  PGY-5  PCCM Fellow  Pager [phone] : Shanice    INDICATION: Hypoxemia, hypercarbia    PROCEDURE:  [ ] LIMITED ECHO  [x] LIMITED CHEST  [ ] LIMITED RETROPERITONEAL  [ ] LIMITED ABDOMINAL  [ ] LIMITED DVT  [ ] NEEDLE GUIDANCE VASCULAR  [ ] NEEDLE GUIDANCE THORACENTESIS  [ ] NEEDLE GUIDANCE PARACENTESIS  [ ] NEEDLE GUIDANCE PERICARDIOCENTESIS  [ ] OTHER    FINDINGS:  A-line predominant, curtain sign present over lungs    INTERPRETATION:  Positive diaphragmatic movement; clear lungs without large consolidation or effusion    Suleiman Prasad MD  PGY-5  PCCM Fellow  Pager [phone]    Attending Attestation:  I was present during the key portions of the procedure and immediately available during the entire procedure.  Eun Love MD  Attending  Pulmonary & Critical Care Medicine

## 2021-07-07 NOTE — DIETITIAN INITIAL EVALUATION ADULT. - PERTINENT LABORATORY DATA
07-07    135  |  100  |  18  ----------------------------<  101<H>  4.1   |  24  |  0.68    Ca    8.8      07 Jul 2021 01:07  Phos  3.0     07-07  Mg     2.00     07-07    TPro  5.7<L>  /  Alb  2.8<L>  /  TBili  0.4  /  DBili  x   /  AST  23  /  ALT  20  /  AlkPhos  50  07-07    HbA1c 5.2%

## 2021-07-07 NOTE — PROGRESS NOTE ADULT - SUBJECTIVE AND OBJECTIVE BOX
Sheryl (Antonallan Parkinson  PGY-3  NS: [phone]    INTERVAL HPI/OVERNIGHT EVENTS:    SUBJECTIVE: Patient seen and examined at bedside.     CONSTITUTIONAL: No weakness, fevers or chills  EYES/ENT: No visual changes;  No vertigo or throat pain   NECK: No pain or stiffness  RESPIRATORY: No cough, wheezing, hemoptysis; No shortness of breath  CARDIOVASCULAR: No chest pain or palpitations  GASTROINTESTINAL: No abdominal or epigastric pain. No nausea, vomiting, or hematemesis; No diarrhea or constipation. No melena or hematochezia.  GENITOURINARY: No dysuria, frequency or hematuria  NEUROLOGICAL: No numbness or weakness  SKIN: No itching, rashes    OBJECTIVE:    VITAL SIGNS:  ICU Vital Signs Last 24 Hrs  T(C): 36.9 (2021 08:00), Max: 37.7 (2021 12:00)  T(F): 98.5 (2021 08:00), Max: 99.9 (2021 12:00)  HR: 71 (2021 07:30) (60 - 121)  BP: 94/74 (2021 07:30) (65/50 - 153/81)  BP(mean): 81 (2021 07:30) (53 - 116)  ABP: --  ABP(mean): --  RR: 18 (2021 07:30) (14 - 45)  SpO2: 100% (2021 07:30) (90% - 100%)    Mode: AC/ CMV (Assist Control/ Continuous Mandatory Ventilation), RR (machine): 14, TV (machine): 400, FiO2: 30, PEEP: 5, ITime: 0.92, MAP: 8, PIP: 20     @ 07:01  -   @ 07:00  --------------------------------------------------------  IN: 1047.2 mL / OUT: 620 mL / NET: 427.2 mL      CAPILLARY BLOOD GLUCOSE      POCT Blood Glucose.: 105 mg/dL (2021 06:22)      PHYSICAL EXAM:    General: NAD  HEENT: NC/AT; PERRL, clear conjunctiva  Neck: supple  Respiratory: CTA b/l  Cardiovascular: +S1/S2; RRR  Abdomen: soft, NT/ND; +BS x4  Extremities: WWP, 2+ peripheral pulses b/l; no LE edema  Skin: normal color and turgor; no rash  Neurological:    MEDICATIONS:  MEDICATIONS  (STANDING):  atorvastatin 40 milliGRAM(s) Oral at bedtime  chlorhexidine 4% Liquid 1 Application(s) Topical <User Schedule>  dexMEDEtomidine Infusion 0.2 MICROgram(s)/kG/Hr (3.57 mL/Hr) IV Continuous <Continuous>  donepezil 10 milliGRAM(s) Oral at bedtime  heparin   Injectable 5000 Unit(s) SubCutaneous every 8 hours  latanoprost 0.005% Ophthalmic Solution 1 Drop(s) Both EYES at bedtime  levETIRAcetam  Solution 750 milliGRAM(s) Oral two times a day  norepinephrine Infusion 0.05 MICROgram(s)/kG/Min (6.68 mL/Hr) IV Continuous <Continuous>  OXcarbazepine 300 milliGRAM(s) Oral two times a day  piperacillin/tazobactam IVPB.. 3.375 Gram(s) IV Intermittent every 8 hours  riluzole 50 milliGRAM(s) Oral <User Schedule>    MEDICATIONS  (PRN):      ALLERGIES:  Allergies    No Known Allergies    Intolerances        LABS:                        11.4   13.26 )-----------( 148      ( 2021 01:07 )             35.1     07-07    135  |  100  |  18  ----------------------------<  101<H>  4.1   |  24  |  0.68    Ca    8.8      2021 01:07  Phos  3.0     07-07  Mg     2.00     07-07    TPro  5.7<L>  /  Alb  2.8<L>  /  TBili  0.4  /  DBili  x   /  AST  23  /  ALT  20  /  AlkPhos  50  07-07      Urinalysis Basic - ( 2021 21:04 )    Color: Yellow / Appearance: Clear / S.018 / pH: x  Gluc: x / Ketone: Negative  / Bili: Negative / Urobili: <2 mg/dL   Blood: x / Protein: Trace / Nitrite: Positive   Leuk Esterase: Small / RBC: 5 /HPF / WBC 10 /HPF   Sq Epi: x / Non Sq Epi: 0 /HPF / Bacteria: Many        RADIOLOGY & ADDITIONAL TESTS: Reviewed. Sheryl Parkinson (Ariel)  PGY-3  NS: [phone]    INTERVAL HPI/OVERNIGHT EVENTS: hypotensive overnight requiring restarting levophed. Urine culture grew ecoli, sensitivity not available yet.    SUBJECTIVE: Patient seen and examined at bedside. Reports feeling thirsty. Mask on the face is a little uncomfortable but otherwise feeling okay. Breathing is better with the bipap machine.    OBJECTIVE:    VITAL SIGNS:  ICU Vital Signs Last 24 Hrs  T(C): 36.9 (2021 08:00), Max: 37.7 (2021 12:00)  T(F): 98.5 (2021 08:00), Max: 99.9 (2021 12:00)  HR: 71 (2021 07:30) (60 - 121)  BP: 94/74 (2021 07:30) (65/50 - 153/81)  BP(mean): 81 (2021 07:30) (53 - 116)  ABP: --  ABP(mean): --  RR: 18 (2021 07:30) (14 - 45)  SpO2: 100% (2021 07:30) (90% - 100%)    Mode: AC/ CMV (Assist Control/ Continuous Mandatory Ventilation), RR (machine): 14, TV (machine): 400, FiO2: 30, PEEP: 5, ITime: 0.92, MAP: 8, PIP: 20    07-06 @ 07:01  -  07-07 @ 07:00  --------------------------------------------------------  IN: 1047.2 mL / OUT: 620 mL / NET: 427.2 mL      CAPILLARY BLOOD GLUCOSE      POCT Blood Glucose.: 105 mg/dL (2021 06:22)      PHYSICAL EXAM:    General: NAD, sitting up in bed comfortably  HEENT: NC/AT; PERRL, clear conjunctiva  Neck: supple  Respiratory: CTA b/l, on bipap  Cardiovascular: +S1/S2; RRR  Abdomen: soft, NT/ND; +BS x4  Extremities: WWP, 2+ peripheral pulses b/l; no LE edema  Skin: normal color and turgor; no rash  Neurological: moving extremities spontaneously    MEDICATIONS:  MEDICATIONS  (STANDING):  atorvastatin 40 milliGRAM(s) Oral at bedtime  chlorhexidine 4% Liquid 1 Application(s) Topical <User Schedule>  dexMEDEtomidine Infusion 0.2 MICROgram(s)/kG/Hr (3.57 mL/Hr) IV Continuous <Continuous>  donepezil 10 milliGRAM(s) Oral at bedtime  heparin   Injectable 5000 Unit(s) SubCutaneous every 8 hours  latanoprost 0.005% Ophthalmic Solution 1 Drop(s) Both EYES at bedtime  levETIRAcetam  Solution 750 milliGRAM(s) Oral two times a day  norepinephrine Infusion 0.05 MICROgram(s)/kG/Min (6.68 mL/Hr) IV Continuous <Continuous>  OXcarbazepine 300 milliGRAM(s) Oral two times a day  piperacillin/tazobactam IVPB.. 3.375 Gram(s) IV Intermittent every 8 hours  riluzole 50 milliGRAM(s) Oral <User Schedule>    MEDICATIONS  (PRN):      ALLERGIES:  Allergies    No Known Allergies    Intolerances        LABS:                        11.4   13.26 )-----------( 148      ( 2021 01:07 )             35.1     07-07    135  |  100  |  18  ----------------------------<  101<H>  4.1   |  24  |  0.68    Ca    8.8      2021 01:07  Phos  3.0     07-07  Mg     2.00     07-07    TPro  5.7<L>  /  Alb  2.8<L>  /  TBili  0.4  /  DBili  x   /  AST  23  /  ALT  20  /  AlkPhos  50  07-07      Urinalysis Basic - ( 2021 21:04 )    Color: Yellow / Appearance: Clear / S.018 / pH: x  Gluc: x / Ketone: Negative  / Bili: Negative / Urobili: <2 mg/dL   Blood: x / Protein: Trace / Nitrite: Positive   Leuk Esterase: Small / RBC: 5 /HPF / WBC 10 /HPF   Sq Epi: x / Non Sq Epi: 0 /HPF / Bacteria: Many        RADIOLOGY & ADDITIONAL TESTS: Reviewed.

## 2021-07-07 NOTE — PROGRESS NOTE ADULT - ATTENDING COMMENTS
70 M with seizures, possible ALS here with sob/weakness, found to have acute hypoxemic and hypercapnic respiratory failure of unclear etiology.    - extubated yesterday to NIPPV; have tried to take it off, but he gets more sob  - will c/w NIPPV and wean as tolerated (switch to AVAPS); can check ABG  - still on precedex, will try to wean  - start on low dose midodrine  - f/u neurology reccs 70 M with seizures, possible ALS here with sob/weakness, found to have acute hypoxemic and hypercapnic respiratory failure of unclear etiology.    - extubated yesterday to NIPPV; have tried to take it off, but he gets more sob  - will c/w NIPPV and wean as tolerated (switch to AVAPS); can check ABG  - still on precedex, will try to wean  - start on low dose midodrine  - c/w zosyn for ecoli uti  - f/u neurology reccs

## 2021-07-07 NOTE — DIETITIAN INITIAL EVALUATION ADULT. - ADD RECOMMEND
-Maintain NPO @ this time.  If NPO remains prolonged (>/= 3 days), suggest provision of alternate means of nutrition, as medically feasible/appropriate.                                                                         -If Pt. pulmonary/respiratory status improves, consider advancing to PO diet.  Would obtain swallow evaluation prior to initiation.

## 2021-07-08 NOTE — CHART NOTE - NSCHARTNOTEFT_GEN_A_CORE
Pre-Bronchoscopy Tuberculosis Risk Screening Tool  To reduce the risk for airborne transmission of Mycobacterium tuberculosis, this assessment form must be completed prior to bronchoscopy procedures being performed outside of a negative pressure environment.    Procedure Date & Time: ________7/8/21 @ 1600______________  Health Care Provider Name: ________Duenas________________  Reason for the Bronchoscopy: __________Hypoxemia, infection________________    Planned Location for the Procedure:  [ ] Emergency Department     [x] Intensive Care Unit     [ ] Operating Room     Other: ___________________    Risk Assessment  I. I have personally evaluated this patient for Mycobacterium tuberculosis and I determined the following risk:  [x] Low risk for TB     [ ] Significant risk for TB    II. Additional Findings: _________________________    III. Based on the Determined Risk for TB the following Action(s) are Suggested:  If there is a significant risk for tuberculosis infection, the following recommendations should be followed:            a. Perform the procedure in a negative pressure room, with N95 respirator.            b. If not feasible to move the patient or defer the procedure:                    i. Use a single-bedded room low traffic area to perform the bronchoscopy procedure.                   ii. Place a portable high-efficiency particulate air (HEPA) filter in the space prior to starting the procedure and keep closed.                       Refer to the INF.1132 titled “Tuberculosis Control Strategy Plan” for additional information.                  iii. All Health Care Providers within the procedure room shall wear an N95 respirator.            c. Documentation of the tuberculosis risk assessment should be included within the patient’s medical record.      Bronchoscopy Procedure Note:  Indication: Hypoxemic respiratory failure    Operators: Shanice    Pre-op Dx: HRF    History: ALS    Xray/CT Findings: Reviewed    Findings:  Bronchoscope inserted through ETT. ETT noted to be in good position. Airways examined to the subsegments. Examination notable for thick white secretions from LLL and RML/RLL. BAL performed in RLL and RML. No endobronchial masses or lesions seen. Bronchoscope then withdrawn from ETT.    Specimens: Cell count, fungal culture, bacterial culture, AFB Pre-Bronchoscopy Tuberculosis Risk Screening Tool  To reduce the risk for airborne transmission of Mycobacterium tuberculosis, this assessment form must be completed prior to bronchoscopy procedures being performed outside of a negative pressure environment.    Procedure Date & Time: ________7/8/21 @ 1600______________  Health Care Provider Name: ________Duenas________________  Reason for the Bronchoscopy: __________Hypoxemia, infection________________    Planned Location for the Procedure:  [ ] Emergency Department     [x] Intensive Care Unit     [ ] Operating Room     Other: ___________________    Risk Assessment  I. I have personally evaluated this patient for Mycobacterium tuberculosis and I determined the following risk:  [x] Low risk for TB     [ ] Significant risk for TB    II. Additional Findings: _________________________    III. Based on the Determined Risk for TB the following Action(s) are Suggested:  If there is a significant risk for tuberculosis infection, the following recommendations should be followed:            a. Perform the procedure in a negative pressure room, with N95 respirator.            b. If not feasible to move the patient or defer the procedure:                    i. Use a single-bedded room low traffic area to perform the bronchoscopy procedure.                   ii. Place a portable high-efficiency particulate air (HEPA) filter in the space prior to starting the procedure and keep closed.                       Refer to the INF.1132 titled “Tuberculosis Control Strategy Plan” for additional information.                  iii. All Health Care Providers within the procedure room shall wear an N95 respirator.            c. Documentation of the tuberculosis risk assessment should be included within the patient’s medical record.      Bronchoscopy Procedure Note:  Indication: Hypoxemic respiratory failure    Operators: Shanice    Pre-op Dx: HRF    History: ALS    Xray/CT Findings: Reviewed    Findings:  Bronchoscope inserted through ETT. ETT noted to be in good position. Airways examined to the subsegments. Examination notable for thick white secretions from LLL and RML/RLL. BAL performed in RLL and RML. No endobronchial masses or lesions seen. Bronchoscope then withdrawn from ETT.    Specimens: Cell count, fungal culture, bacterial culture, AFB    Attending Attestation:  I was present during the key portions of the procedure and immediately available during the entire procedure.  Eun Love MD  Attending  Pulmonary & Critical Care Medicine

## 2021-07-08 NOTE — PROGRESS NOTE ADULT - ASSESSMENT
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   Respiratory failure with CO2 retention and reintubated   Hypotension and   on pressors  GABINO (YAMILE criteria of serum creatinine > 0.3 elevation with reduced urine output)  Hypophosphatemia     1 Renal- Renal function improved despite intubation and pressors   IV naphos x 1  2 Pulm-Reintubated and bronch today   3 Neuro- Workup for ALS in progress(but thus far the workup is negative)  4 ID-IV abx     DW Wife in detail     [facility]   [phone]

## 2021-07-08 NOTE — PROGRESS NOTE ADULT - SUBJECTIVE AND OBJECTIVE BOX
SUBJECTIVE/INTERVAL EVENTS/HOSPITAL COURSE UPDATES: PT records reviewed from outpatient neurologist, Dr. Carrasco.  Had basic work up including anti-ganglioside antibodies which were negative and EMG which showed axonal loss but in arms only and was non-conclusive.  MRI outpatient was also non-informative other than cervicogenic disc disease.     Home Medications:  DONEPEZIL    TAB 10M:  (04 Jul 2021 14:22)  LATANOPROST 0.005% EYE DROPS: INSTILL 1 DROP IN EACH EYE AT BEDTIME (04 Jul 2021 14:22)  levETIRAcetam 750 mg oral tablet: 1 tab(s) orally 2 times a day (04 Jul 2021 14:22)  OXcarbazepine 300 mg oral tablet: 1 tab(s) orally 2 times a day (04 Jul 2021 14:22)  RILUZOLE 50 MG TABLET: TAKE 1 TABLET BY MOUTH EVERY DAY (04 Jul 2021 14:22)  SIMVASTATIN  TAB 10M:  (04 Jul 2021 14:22)      MEDICATIONS  (STANDING):  atorvastatin 40 milliGRAM(s) Oral at bedtime  chlorhexidine 0.12% Liquid 15 milliLiter(s) Oral Mucosa every 12 hours  chlorhexidine 4% Liquid 1 Application(s) Topical <User Schedule>  donepezil 10 milliGRAM(s) Oral at bedtime  fentaNYL   Infusion. 2 MICROgram(s)/kG/Hr (14.3 mL/Hr) IV Continuous <Continuous>  heparin   Injectable 5000 Unit(s) SubCutaneous every 8 hours  latanoprost 0.005% Ophthalmic Solution 1 Drop(s) Both EYES at bedtime  levETIRAcetam 750 milliGRAM(s) Oral two times a day  norepinephrine Infusion 0.05 MICROgram(s)/kG/Min (6.68 mL/Hr) IV Continuous <Continuous>  OXcarbazepine 300 milliGRAM(s) Oral two times a day  piperacillin/tazobactam IVPB.. 3.375 Gram(s) IV Intermittent every 8 hours  propofol Infusion 10 MICROgram(s)/kG/Min (4.28 mL/Hr) IV Continuous <Continuous>  riluzole 50 milliGRAM(s) Oral <User Schedule>    MEDICATIONS  (PRN):  acetaminophen    Suspension .. 650 milliGRAM(s) Oral every 6 hours PRN Temp greater or equal to 38C (100.4F)      OBJECTIVE:  VITAL SIGNS:   Vital Signs Last 24 Hrs  T(C): 38.1 (08 Jul 2021 20:00), Max: 38.3 (08 Jul 2021 16:00)  T(F): 100.5 (08 Jul 2021 20:00), Max: 100.9 (08 Jul 2021 16:00)  HR: 75 (08 Jul 2021 22:00) (58 - 112)  BP: 107/64 (08 Jul 2021 22:00) (79/59 - 168/80)  BP(mean): 77 (08 Jul 2021 22:00) (62 - 109)  RR: 20 (08 Jul 2021 22:00) (15 - 34)  SpO2: 99% (08 Jul 2021 22:00) (91% - 100%)                                12.6   17.33 )-----------( 233      ( 08 Jul 2021 09:51 )             38.7     07-08    137  |  99  |  22  ----------------------------<  122<H>  4.5   |  25  |  0.52    Ca    8.8      08 Jul 2021 09:51  Phos  1.3     07-08  Mg     2.10     07-08    TPro  6.3  /  Alb  2.9<L>  /  TBili  0.2  /  DBili  x   /  AST  24  /  ALT  27  /  AlkPhos  65  07-08    LIVER FUNCTIONS - ( 08 Jul 2021 09:51 )  Alb: 2.9 g/dL / Pro: 6.3 g/dL / ALK PHOS: 65 U/L / ALT: 27 U/L / AST: 24 U/L / GGT: x                     CSF Results:       IMAGING/STUDIES    CT Head No Cont:  (07-05)

## 2021-07-08 NOTE — PROGRESS NOTE ADULT - ASSESSMENT
70M w/ PMHx seizures, HTN, and possible ALS presenting for weakness and shortness of breath, found to be hypoxic and hypercapneic and intubated for airway protection in setting of poor inspiratory effort.     #Neuro  -wean precedex (on for anxiolytic)  -outpt w/u to eval for ALS/bekah disease (Dr. Carrasco), on home riluzole  -house neuro consulted, f/u recs  -c/w home keppra, oxcarbazepine, riluzole  -CTH neg for acute pathology  -PT eval    #Pulm  -acute on chronic hypercapneic respiratory failure, initial VBG pH 7.17, pCO2>110, bicarb 32; intubated in the ED. CXT no acute finding. Likely in the setting of motor neuron dysfunction. Considered infectious etiology but CXR neg and no reported cough, less likely at this time  -Bedside US didn't show consolidation  -trial highflow NC today and repeat ABG, if not retaining pCO2 then can do highflow NC during the day and nocturnal avap/bipap    #CV  -levophed restarted overnight for hypotension, now weaned off  -continue to monitor  -trial midodrine 5 for hypotension    #GI  -no active issue  -NPO except med at this time. If stable on highflow and deemed low risk for re-intubation, then can do bedside swallow and start diet    #Renal  -elevated bicarb on admission, likely in the setting of chronic respiratory acidosis, now resolved  -monitor UOP    #Endo  -FS q6h while npo on tube feed    #heme  -heparin subq    #ID  -UA neg on admission now +, started on zosyn, continue for now and adjust based on sensitivities  -CXR and bedside US no evidence of PNA  -blood culture neg so far, f/u final results    #Ethics  -Full code    70M w/ PMHx seizures, HTN, and possible ALS presenting for weakness and shortness of breath, found to be hypoxic and hypercapneic and intubated for airway protection in setting of poor inspiratory effort.     #Neuro  -on gent and propofol, wean as tolerated  -outpt w/u to eval for ALS/bekah disease (Dr. Carrasco), on home riluzole  -house neuro consulted, f/u recs  -c/w home keppra, oxcarbazepine, riluzole  -CTH neg for acute pathology  -PT eval    #Pulm  -acute on chronic hypercapneic respiratory failure, initial VBG pH 7.17, pCO2>110, bicarb 32; intubated in the ED. CXT no acute finding. Likely in the setting of motor neuron dysfunction. Considered infectious etiology but CXR neg and no reported cough, less likely at this time  -Bedside US didn't show consolidation on admission  -CXR today showed RLL consolidation on our read  -reintubated for hypoxia respiratory failure likely 2/2 aspiration; bronch today w/ scant thick secretion  -will discuss with neuro regarding etiology of his respiration status. If it is from ALS, then may need trach    #CV  -levophed restarted in the setting sedatives, wean as tolerated  -continue to monitor    #GI  -no active issue  -NPO w/ tube feed    #Renal  -elevated bicarb on admission, likely in the setting of chronic respiratory acidosis, now resolved  -retaining urine, cabrera placed today    #Endo  -FS q6h while npo on tube feed    #heme  -heparin subq    #ID  -UA neg on admission now +, started on zosyn, continue for now and adjust based on sensitivities  -CXR and bedside US no evidence of PNA, CXR today showed consolidation so c/w zosyn  -blood culture neg so far, f/u final results    #Ethics  -Full code    (2) assistive person

## 2021-07-08 NOTE — PROGRESS NOTE ADULT - ATTENDING COMMENTS
70 M with seizures, possible ALS here with sob/weakness, found to have acute hypoxemic and hypercapnic respiratory failure of unclear etiology.    - extubated but remained on NIPPV, [id number] he was aspirating again and this AM, became more hypoxemic and hypercapnic, required re-intubation  - s/p bronch, will f/u cultures  - c/w broad abx for uti and aspiration pna  - f/u neurology reccs; if this is indeed ALS variant, he may need tracheostomy

## 2021-07-08 NOTE — PROGRESS NOTE ADULT - ATTENDING COMMENTS
Patient seen and examined at bedside with neurology consult team on 7/8/2021.     Family at bedside (wife and brother in law). Wife reports that patient has a family hx of ALS- father and moms cousin. He began to have trouble with SOB (particularly when lying flat) starting in Feb 2021. This slowly progressed. He was seen by pulm and card and it was determined that he had diaphragm weakness and referred to see Dr. Carrasco. It was advised by Pulm that he use trilogy machine, however he did not tolerate the machine. Over the last month, he reported more increased work of breathing with exertion, some weakness in proximal UE and some trouble swallowing with neck extension weakness. He was seeing PT and they gave him a neck collar to help assist with the neck weakness. She denied any recent illness prior to arrival. Work up completed by Dr. Carrasco included UE EMG/NCS, MRI cervical spine and anti ganglioside antibodies which were non diagnostic. Dr. Carrasco suspected clinical diagnosis of ALS/Motor neuron disease - given fasciculations noted on exam and referred to Dr. Gill for second opinion (apt scheduled for end of July).    Patient was intubated on admission due to hypoxic-hypercarbic  respiratory failure. He was however successfully extubated to Bipap and was able to communicate with writing, however on 7/8 AM he was re intubated. Also noted per nursing staff, + copious secretions and plan for bronchial lavage today.  Patient on Zoysn.    On exam, patient is intubated and on sedation (propofol@ 50). He moves RUE spontaneously and reaches for ET tube and opens eyes to voice. Does not follow commands. + Fasciculations noted in left thigh. 3-4 beat clonus on left.     Neurology will re assess patient off sedation. Please call neurology once patient is extubated. Continue supportive care and infection management as per primary team. Patient seen and examined at bedside with neurology consult team on 7/8/2021.     Family at bedside (wife and brother in law). Wife reports that patient has a family hx of ALS- father and moms cousin. He began to have trouble with SOB (particularly when lying flat) starting in Feb 2021. This slowly progressed. He was seen by pulm and card and it was determined that he had diaphragm weakness and referred to see Dr. Carrasco. It was advised by Pulm that he use trilogy machine, however he did not tolerate the machine. Over the last month, he reported more increased work of breathing with exertion, some weakness in proximal UE and some trouble swallowing with neck extension weakness. He was seeing PT and they gave him a neck collar to help assist with the neck weakness. She denied any recent illness prior to arrival. Work up completed by Dr. Carrasco included UE EMG/NCS, MRI cervical spine and anti ganglioside antibodies which were non diagnostic. Dr. Carrasco suspected clinical diagnosis of ALS/Motor neuron disease - given fasciculations noted on exam and referred to Dr. Gill for second opinion (apt scheduled for end of July).    Patient was intubated on admission due to hypoxic-hypercarbic  respiratory failure. He was however successfully extubated to Bipap and was able to communicate with writing, however on 7/8 AM he was re intubated due to copious secretions and trouble clearing secretions. Plan for bronchial lavage per primary team.  Patient on Zoysn. + UTI.     On exam, patient is intubated and on sedation (propofol@ 50). He moves RUE spontaneously and reaches for ET tube and opens eyes to voice. Does not follow commands. + Fasciculations noted in left thigh. 3-4 beat clonus on left.     Neurology will re assess patient off sedation. Please call neurology once patient is extubated. Continue supportive care and infection management as per primary team. At this time, there continues to be a clinical concern for motor neuron disease/ALS.

## 2021-07-08 NOTE — PROGRESS NOTE ADULT - SUBJECTIVE AND OBJECTIVE BOX
Sheryl (Santino Parkinson  PGY-3  NS: [phone]    INTERVAL HPI/OVERNIGHT EVENTS:    SUBJECTIVE: Patient seen and examined at bedside.     CONSTITUTIONAL: No weakness, fevers or chills  EYES/ENT: No visual changes;  No vertigo or throat pain   NECK: No pain or stiffness  RESPIRATORY: No cough, wheezing, hemoptysis; No shortness of breath  CARDIOVASCULAR: No chest pain or palpitations  GASTROINTESTINAL: No abdominal or epigastric pain. No nausea, vomiting, or hematemesis; No diarrhea or constipation. No melena or hematochezia.  GENITOURINARY: No dysuria, frequency or hematuria  NEUROLOGICAL: No numbness or weakness  SKIN: No itching, rashes    OBJECTIVE:    VITAL SIGNS:  ICU Vital Signs Last 24 Hrs  T(C): 35.9 (08 Jul 2021 04:00), Max: 36.9 (07 Jul 2021 08:00)  T(F): 96.7 (08 Jul 2021 04:00), Max: 98.5 (07 Jul 2021 08:00)  HR: 86 (08 Jul 2021 07:00) (57 - 102)  BP: 168/80 (08 Jul 2021 07:00) (72/52 - 168/80)  BP(mean): 104 (08 Jul 2021 07:00) (60 - 112)  ABP: --  ABP(mean): --  RR: 32 (08 Jul 2021 07:00) (9 - 38)  SpO2: 97% (08 Jul 2021 07:00) (91% - 100%)    Mode: NIV (Noninvasive Ventilation), RR (machine): 20, TV (machine): 450, FiO2: 55, PEEP: 5, ITime: 0.9, P-High: 30, P-Low: 10, PIP: 15    07-07 @ 07:01  -  07-08 @ 07:00  --------------------------------------------------------  IN: 770.3 mL / OUT: 550 mL / NET: 220.3 mL      CAPILLARY BLOOD GLUCOSE      POCT Blood Glucose.: 108 mg/dL (08 Jul 2021 06:40)      PHYSICAL EXAM:    General: NAD  HEENT: NC/AT; PERRL, clear conjunctiva  Neck: supple  Respiratory: CTA b/l  Cardiovascular: +S1/S2; RRR  Abdomen: soft, NT/ND; +BS x4  Extremities: WWP, 2+ peripheral pulses b/l; no LE edema  Skin: normal color and turgor; no rash  Neurological:    MEDICATIONS:  MEDICATIONS  (STANDING):  atorvastatin 40 milliGRAM(s) Oral at bedtime  cefTRIAXone   IVPB 1000 milliGRAM(s) IV Intermittent every 24 hours  chlorhexidine 4% Liquid 1 Application(s) Topical <User Schedule>  dexMEDEtomidine Infusion 0.2 MICROgram(s)/kG/Hr (3.57 mL/Hr) IV Continuous <Continuous>  donepezil 10 milliGRAM(s) Oral at bedtime  heparin   Injectable 5000 Unit(s) SubCutaneous every 8 hours  latanoprost 0.005% Ophthalmic Solution 1 Drop(s) Both EYES at bedtime  levETIRAcetam 750 milliGRAM(s) Oral two times a day  norepinephrine Infusion 0.05 MICROgram(s)/kG/Min (6.68 mL/Hr) IV Continuous <Continuous>  OXcarbazepine 300 milliGRAM(s) Oral two times a day  riluzole 50 milliGRAM(s) Oral <User Schedule>    MEDICATIONS  (PRN):      ALLERGIES:  Allergies    No Known Allergies    Intolerances        LABS:                        12.7   17.09 )-----------( 191      ( 08 Jul 2021 02:13 )             40.2     07-08    136  |  98  |  21  ----------------------------<  113<H>  4.5   |  27  |  0.56    Ca    9.1      08 Jul 2021 02:13  Phos  2.9     07-08  Mg     2.20     07-08    TPro  6.6  /  Alb  3.1<L>  /  TBili  0.3  /  DBili  x   /  AST  29  /  ALT  30  /  AlkPhos  63  07-08          RADIOLOGY & ADDITIONAL TESTS: Reviewed. Sheryl (Santino Parkinson  PGY-3  NS: [phone]    INTERVAL HPI/OVERNIGHT EVENTS: urine culture grew e coli so zosyn changed to ctx.     SUBJECTIVE: Patient seen and examined at bedside. Patient was tachypneic this morning and having a lot of sputum but unable to bring it up. Patient desatted acutely and requiring intubation.     OBJECTIVE:    VITAL SIGNS:  ICU Vital Signs Last 24 Hrs  T(C): 35.9 (08 Jul 2021 04:00), Max: 36.9 (07 Jul 2021 08:00)  T(F): 96.7 (08 Jul 2021 04:00), Max: 98.5 (07 Jul 2021 08:00)  HR: 86 (08 Jul 2021 07:00) (57 - 102)  BP: 168/80 (08 Jul 2021 07:00) (72/52 - 168/80)  BP(mean): 104 (08 Jul 2021 07:00) (60 - 112)  ABP: --  ABP(mean): --  RR: 32 (08 Jul 2021 07:00) (9 - 38)  SpO2: 97% (08 Jul 2021 07:00) (91% - 100%)    Mode: NIV (Noninvasive Ventilation), RR (machine): 20, TV (machine): 450, FiO2: 55, PEEP: 5, ITime: 0.9, P-High: 30, P-Low: 10, PIP: 15    07-07 @ 07:01  -  07-08 @ 07:00  --------------------------------------------------------  IN: 770.3 mL / OUT: 550 mL / NET: 220.3 mL      CAPILLARY BLOOD GLUCOSE      POCT Blood Glucose.: 108 mg/dL (08 Jul 2021 06:40)      PHYSICAL EXAM:    General: NAD  HEENT: NC/AT; PERRL, clear conjunctiva  Neck: supple  Respiratory: CTA b/l  Cardiovascular: +S1/S2; RRR  Abdomen: soft, NT/ND; +BS x4  Extremities: WWP, 2+ peripheral pulses b/l; no LE edema  Skin: normal color and turgor; no rash  Neurological:    MEDICATIONS:  MEDICATIONS  (STANDING):  atorvastatin 40 milliGRAM(s) Oral at bedtime  cefTRIAXone   IVPB 1000 milliGRAM(s) IV Intermittent every 24 hours  chlorhexidine 4% Liquid 1 Application(s) Topical <User Schedule>  dexMEDEtomidine Infusion 0.2 MICROgram(s)/kG/Hr (3.57 mL/Hr) IV Continuous <Continuous>  donepezil 10 milliGRAM(s) Oral at bedtime  heparin   Injectable 5000 Unit(s) SubCutaneous every 8 hours  latanoprost 0.005% Ophthalmic Solution 1 Drop(s) Both EYES at bedtime  levETIRAcetam 750 milliGRAM(s) Oral two times a day  norepinephrine Infusion 0.05 MICROgram(s)/kG/Min (6.68 mL/Hr) IV Continuous <Continuous>  OXcarbazepine 300 milliGRAM(s) Oral two times a day  riluzole 50 milliGRAM(s) Oral <User Schedule>    MEDICATIONS  (PRN):      ALLERGIES:  Allergies    No Known Allergies    Intolerances        LABS:                        12.7   17.09 )-----------( 191      ( 08 Jul 2021 02:13 )             40.2     07-08    136  |  98  |  21  ----------------------------<  113<H>  4.5   |  27  |  0.56    Ca    9.1      08 Jul 2021 02:13  Phos  2.9     07-08  Mg     2.20     07-08    TPro  6.6  /  Alb  3.1<L>  /  TBili  0.3  /  DBili  x   /  AST  29  /  ALT  30  /  AlkPhos  63  07-08          RADIOLOGY & ADDITIONAL TESTS: Reviewed. Sheryl (Santino Parkinson  PGY-3  NS: [phone]    INTERVAL HPI/OVERNIGHT EVENTS: urine culture grew e coli so zosyn changed to ctx.     SUBJECTIVE: Patient seen and examined at bedside. Patient was tachypneic this morning and having a lot of sputum but unable to bring it up. Patient desatted acutely and requiring re-intubation.     OBJECTIVE:    VITAL SIGNS:  ICU Vital Signs Last 24 Hrs  T(C): 35.9 (08 Jul 2021 04:00), Max: 36.9 (07 Jul 2021 08:00)  T(F): 96.7 (08 Jul 2021 04:00), Max: 98.5 (07 Jul 2021 08:00)  HR: 86 (08 Jul 2021 07:00) (57 - 102)  BP: 168/80 (08 Jul 2021 07:00) (72/52 - 168/80)  BP(mean): 104 (08 Jul 2021 07:00) (60 - 112)  ABP: --  ABP(mean): --  RR: 32 (08 Jul 2021 07:00) (9 - 38)  SpO2: 97% (08 Jul 2021 07:00) (91% - 100%)    Mode: NIV (Noninvasive Ventilation), RR (machine): 20, TV (machine): 450, FiO2: 55, PEEP: 5, ITime: 0.9, P-High: 30, P-Low: 10, PIP: 15    07-07 @ 07:01  -  07-08 @ 07:00  --------------------------------------------------------  IN: 770.3 mL / OUT: 550 mL / NET: 220.3 mL      CAPILLARY BLOOD GLUCOSE      POCT Blood Glucose.: 108 mg/dL (08 Jul 2021 06:40)      PHYSICAL EXAM:    General: moderate distress with tachypnea  HEENT: NC/AT; PERRL, clear conjunctiva  Neck: supple  Respiratory: congested b/l  Cardiovascular: +S1/S2; RRR  Abdomen: soft, NT/ND; +BS x4  Extremities: WWP, 2+ peripheral pulses b/l; no LE edema  Skin: normal color and turgor; no rash  Neurological: moving upper extremities    MEDICATIONS:  MEDICATIONS  (STANDING):  atorvastatin 40 milliGRAM(s) Oral at bedtime  cefTRIAXone   IVPB 1000 milliGRAM(s) IV Intermittent every 24 hours  chlorhexidine 4% Liquid 1 Application(s) Topical <User Schedule>  dexMEDEtomidine Infusion 0.2 MICROgram(s)/kG/Hr (3.57 mL/Hr) IV Continuous <Continuous>  donepezil 10 milliGRAM(s) Oral at bedtime  heparin   Injectable 5000 Unit(s) SubCutaneous every 8 hours  latanoprost 0.005% Ophthalmic Solution 1 Drop(s) Both EYES at bedtime  levETIRAcetam 750 milliGRAM(s) Oral two times a day  norepinephrine Infusion 0.05 MICROgram(s)/kG/Min (6.68 mL/Hr) IV Continuous <Continuous>  OXcarbazepine 300 milliGRAM(s) Oral two times a day  riluzole 50 milliGRAM(s) Oral <User Schedule>    MEDICATIONS  (PRN):      ALLERGIES:  Allergies    No Known Allergies    Intolerances        LABS:                        12.7   17.09 )-----------( 191      ( 08 Jul 2021 02:13 )             40.2     07-08    136  |  98  |  21  ----------------------------<  113<H>  4.5   |  27  |  0.56    Ca    9.1      08 Jul 2021 02:13  Phos  2.9     07-08  Mg     2.20     07-08    TPro  6.6  /  Alb  3.1<L>  /  TBili  0.3  /  DBili  x   /  AST  29  /  ALT  30  /  AlkPhos  63  07-08          RADIOLOGY & ADDITIONAL TESTS: Reviewed.

## 2021-07-08 NOTE — PROGRESS NOTE ADULT - ASSESSMENT
Incomplete  70y M with h/o seizures and suspected neuromuscular disorder of undetermined type admitted w/ increased work of breathing for whom neurology was consulted.  PT currently undergoing outpatient work up for ALS vs. MG vs other primary motor neuron disorder.      IMPRESSION: Possible ALS vs other neuromuscular disorder vs other 1* motor neuron disorder.  ALS remains high on differnetial given presentation and FH on both sides of ALS, but this remains a clinical diagnosis and requires exclusion of other causes of neuromuscular respiratory compromise    Plan  []Continue riluozole for now  []F/U remaining MG labs  []Continue weaning trial for possible extubation, consider diaphragmatic ultrasound prior as a metric of possible readiness to extubate.

## 2021-07-09 NOTE — CONSULT NOTE ADULT - SUBJECTIVE AND OBJECTIVE BOX
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 Patient started having difficulty with breathing back in February 2021, underwent cardiopulmonary evaluation where it was determined that his diaphragm was weak. He is followed by outpatient neuro (Dr. Carrasco) who is evaluating patient for possible ALS though most of the workup including EMG was negative thus far. Wife reports that he might have a variant of ALS and that he is to seek second opinion at the end of this month. Wife states that Dr. Carrasco is working him up for bekah disease. About one month ago, patient was started on Riluzole. Pt has a history of seizures treated with Keppra and Oxcarbazepine, his last seizure was over 10 years ago. This morning, patient was found to be increasingly hypoxic to the low 90s, was told to come to ED by his PMDs office. While in the ED, he started having increasing work of breathing, was not able to pull any volumes on incentive spirometer or participate in a NIF. His mental status acutely decompensated and patient was intubated for airway protection and brought to the MICU.  (04 Jul 2021 11:56)    PERTINENT PM/SXH:   No pertinent past medical history    Seizure    History of neuromuscular disorder      Status post meniscectomy      FAMILY HISTORY:  FH: ALS (amyotrophic lateral sclerosis) (Father)    FH: stroke (Sibling)    FH: breast cancer (Sibling)    ITEMS NOT CHECKED ARE NOT PRESENT    SOCIAL HISTORY:   Significant other/partner:  [ ]  Children:  [ ]  Gnosticism/Spirituality:  Substance hx:  [ ]   Tobacco hx:  [ ]   Alcohol hx: [ ]   Home Opioid hx:  [ ] I-Stop Reference No:  Living Situation: [ ]Home  [ ]Long term care  [ ]Rehab [ ]Other    ADVANCE DIRECTIVES:    DNR  MOLST  [ ]  Living Will  [ ]   DECISION MAKER(s):  [ ] Health Care Proxy(s)  [ ] Surrogate(s)  [ ] Guardian           Name(s): Phone Number(s):    BASELINE (I)ADL(s) (prior to admission):  [place]: [ ]Total  [ ] Moderate [ ]Dependent    Allergies    No Known Allergies    Intolerances    MEDICATIONS  (STANDING):  atorvastatin 40 milliGRAM(s) Oral at bedtime  chlorhexidine 0.12% Liquid 15 milliLiter(s) Oral Mucosa every 12 hours  chlorhexidine 4% Liquid 1 Application(s) Topical <User Schedule>  dexMEDEtomidine Infusion 0.2 MICROgram(s)/kG/Hr (3.57 mL/Hr) IV Continuous <Continuous>  donepezil 10 milliGRAM(s) Oral at bedtime  enoxaparin Injectable 40 milliGRAM(s) SubCutaneous daily  latanoprost 0.005% Ophthalmic Solution 1 Drop(s) Both EYES at bedtime  levETIRAcetam 750 milliGRAM(s) Oral two times a day  norepinephrine Infusion 0.05 MICROgram(s)/kG/Min (6.68 mL/Hr) IV Continuous <Continuous>  OXcarbazepine 300 milliGRAM(s) Oral two times a day  piperacillin/tazobactam IVPB.. 3.375 Gram(s) IV Intermittent every 8 hours  propofol Infusion 10 MICROgram(s)/kG/Min (4.28 mL/Hr) IV Continuous <Continuous>  riluzole 50 milliGRAM(s) Oral <User Schedule>    MEDICATIONS  (PRN):  acetaminophen    Suspension .. 650 milliGRAM(s) Oral every 6 hours PRN Temp greater or equal to 38C (100.4F)      PRESENT SYMPTOMS: [ ]Unable to obtain due to poor mentation   Source if other than patient:  [ ]Family   [ ]Team     Pain (Impact on QOL):    Location -         Minimal acceptable level (0-10 scale):                    Aggravating factors -  Quality -  Radiation -  Severity (0-10 scale) -    Timing -    PAIN AD Score:     [email or web address] (press ctrl +  left click to view)    Dyspnea:                           [ ]Mild [ ]Moderate [ ]Severe  Anxiety:                             [ ]Mild [ ]Moderate [ ]Severe  Fatigue:                             [ ]Mild [ ]Moderate [ ]Severe  Nausea:                             [ ]Mild [ ]Moderate [ ]Severe  Loss of appetite:              [ ]Mild [ ]Moderate [ ]Severe  Constipation:                    [ ]Mild [ ]Moderate [ ]Severe    Other Symptoms:  [ ]All other review of systems negative     Karnofsky Performance Score/Palliative Performance Status Version 2:         %    http://palliative.info/resource_material/PPSv2.pdf    PHYSICAL EXAM:  Vital Signs Last 24 Hrs  T(C): 36.9 (09 Jul 2021 12:00), Max: 38.3 (08 Jul 2021 16:00)  T(F): 98.5 (09 Jul 2021 12:00), Max: 100.9 (08 Jul 2021 16:00)  HR: 108 (09 Jul 2021 14:00) (62 - 108)  BP: 128/73 (09 Jul 2021 14:00) (64/41 - 136/75)  BP(mean): 91 (09 Jul 2021 14:00) (49 - 93)  RR: 14 (09 Jul 2021 14:00) (14 - 20)  SpO2: 100% (09 Jul 2021 14:00) (98% - 100%) I&O's Summary    08 Jul 2021 07:01  -  09 Jul 2021 07:00  --------------------------------------------------------  IN: 2749.6 mL / OUT: 1620 mL / NET: 1129.6 mL    09 Jul 2021 07:01  -  09 Jul 2021 14:39  --------------------------------------------------------  IN: 1351.6 mL / OUT: 395 mL / NET: 956.6 mL    GENERAL:  [ ]Alert  [ ]Oriented x   [ ]Lethargic  [ ]Cachexia  [ ]Unarousable  [ ]Verbal  [ ]Non-Verbal  Behavioral:   [ ] Anxiety  [ ] Delirium [ ] Agitation [ ] Other  HEENT:  [ ]Normal   [ ]Dry mouth   [ ]ET Tube/Trach  [ ]Oral lesions  PULMONARY:   [ ]Clear [ ]Tachypnea  [ ]Audible excessive secretions   [ ]Rhonchi        [ ]Right [ ]Left [ ]Bilateral  [ ]Crackles        [ ]Right [ ]Left [ ]Bilateral  [ ]Wheezing     [ ]Right [ ]Left [ ]Bilateral  CARDIOVASCULAR:    [ ]Regular [ ]Irregular [ ]Tachy  [ ]Tan [ ]Murmur [ ]Other  GASTROINTESTINAL:  [ ]Soft  [ ]Distended   [ ]+BS  [ ]Non tender [ ]Tender  [ ]PEG [ ]OGT/ NGT  Last BM:   GENITOURINARY:  [ ]Normal [ ] Incontinent   [ ]Oliguria/Anuria   [ ]Wan  MUSCULOSKELETAL:   [ ]Normal   [ ]Weakness  [ ]Bed/Wheelchair bound [ ]Edema  NEUROLOGIC:   [ ]No focal deficits  [ ] Cognitive impairment  [ ] Dysphagia [ ]Dysarthria [ ] Paresis [ ]Other   SKIN:   [ ]Normal   [ ]Pressure ulcer(s)  [ ]Rash    CRITICAL CARE:  [ ] Shock Present  [ ]Septic [ ]Cardiogenic [ ]Neurologic [ ]Hypovolemic  [ ]  Vasopressors [ ]  Inotropes   [ ] Respiratory failure present  [ ] Acute  [ ] Chronic [ ] Hypoxic  [ ] Hypercarbic [ ] Other  [ ] Other organ failure     GRIEF  [ ] Yes  [ ] No    LABS:                        11.3   11.79 )-----------( 218      ( 09 Jul 2021 01:14 )             34.4   07-09    138  |  101  |  16  ----------------------------<  122<H>  3.1<L>   |  26  |  0.62    Ca    8.2<L>      09 Jul 2021 01:14  Phos  0.5     07-09  Mg     1.90     07-09    TPro  5.2<L>  /  Alb  2.5<L>  /  TBili  0.7  /  DBili  x   /  AST  17  /  ALT  20  /  AlkPhos  50  07-09        RADIOLOGY & ADDITIONAL STUDIES:    PROTEIN CALORIE MALNUTRITION PRESENT: [ ] Yes [ ] No  [ ] PPSV2 < or = to 30% [ ] significant weight loss  [ ] poor nutritional intake [ ] catabolic state [ ] anasarca     Albumin, Serum: 2.5 g/dL (07-09-21 @ 01:14)  Artificial Nutrition [ ]     REFERRALS:   [ ]Chaplaincy  [ ] Hospice  [ ]Child Life  [ ]Social Work  [ ]Case management [ ]Holistic Therapy   Goals of Care Discussion Document:  HPI:  70F w/ PMHx seizures (last 10 years ago), HTN, 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 Patient started having difficulty with breathing back in February 2021, underwent cardiopulmonary evaluation where it was determined that his diaphragm was weak. He is followed by outpatient neuro (Dr. Carrasco) who is evaluating patient for possible ALS though most of the workup including EMG was negative thus far. Wife reports that he might have a variant of ALS and that he is to seek second opinion at the end of this month. Wife states that Dr. Carrasco is working him up for bekah disease. About one month ago, patient was started on Riluzole. Pt has a history of seizures treated with Keppra and Oxcarbazepine, his last seizure was over 10 years ago. This morning, patient was found to be increasingly hypoxic to the low 90s, was told to come to ED by his PMDs office. While in the ED, he started having increasing work of breathing, was not able to pull any volumes on incentive spirometer or participate in a NIF. His mental status acutely decompensated and patient was intubated for airway protection and brought to the MICU.  (04 Jul 2021 11:56)    PERTINENT PM/SXH:   No pertinent past medical history    Seizure    History of neuromuscular disorder      Status post meniscectomy      FAMILY HISTORY:  FH: ALS (amyotrophic lateral sclerosis) (Father)    FH: stroke (Sibling)    FH: breast cancer (Sibling)    ITEMS NOT CHECKED ARE NOT PRESENT    SOCIAL HISTORY:   Significant other/partner:  [ x]  Children:  [ ]  Oriental orthodox/Spirituality: Jehovah's witness  Substance hx:  [ ]   Tobacco hx:  [ ]   Alcohol hx: [ ]   Home Opioid hx:  [ ] I-Stop Reference No:  Living Situation: [ x]Home  [ ]Long term care  [ ]Rehab [ ]Other  Lives at home with wife    ADVANCE DIRECTIVES:    DNR  MOLST  [ ]  Living Will  [ ]   DECISION MAKER(s):  [ ] Health Care Proxy(s)  [x ] Surrogate(s)  [ ] Guardian           Name(s): Phone Number(s):  Wife - Kiki Mills #[phone]    BASELINE (I)ADL(s) (prior to admission):  [place]: [ ]Total  [ x] Moderate [ ]Dependent  Big decline since Feb 2021    Allergies    No Known Allergies    Intolerances    MEDICATIONS  (STANDING):  atorvastatin 40 milliGRAM(s) Oral at bedtime  chlorhexidine 0.12% Liquid 15 milliLiter(s) Oral Mucosa every 12 hours  chlorhexidine 4% Liquid 1 Application(s) Topical <User Schedule>  dexMEDEtomidine Infusion 0.2 MICROgram(s)/kG/Hr (3.57 mL/Hr) IV Continuous <Continuous>  donepezil 10 milliGRAM(s) Oral at bedtime  enoxaparin Injectable 40 milliGRAM(s) SubCutaneous daily  latanoprost 0.005% Ophthalmic Solution 1 Drop(s) Both EYES at bedtime  levETIRAcetam 750 milliGRAM(s) Oral two times a day  norepinephrine Infusion 0.05 MICROgram(s)/kG/Min (6.68 mL/Hr) IV Continuous <Continuous>  OXcarbazepine 300 milliGRAM(s) Oral two times a day  piperacillin/tazobactam IVPB.. 3.375 Gram(s) IV Intermittent every 8 hours  propofol Infusion 10 MICROgram(s)/kG/Min (4.28 mL/Hr) IV Continuous <Continuous>  riluzole 50 milliGRAM(s) Oral <User Schedule>    MEDICATIONS  (PRN):  acetaminophen    Suspension .. 650 milliGRAM(s) Oral every 6 hours PRN Temp greater or equal to 38C (100.4F)    PRESENT SYMPTOMS: [x ]Unable to obtain due to poor mentation   Source if other than patient:  [ ]Family   [ ]Team     Pain (Impact on QOL):    Location -         Minimal acceptable level (0-10 scale):                    Aggravating factors -  Quality -  Radiation -  Severity (0-10 scale) -    Timing -    PAIN AD Score: 0    [email or web address].[email or web address] (press ctrl +  left click to view)    Dyspnea:                           [ ]Mild [ ]Moderate [ ]Severe  Anxiety:                             [ ]Mild [ ]Moderate [ ]Severe  Fatigue:                             [ ]Mild [ ]Moderate [ ]Severe  Nausea:                             [ ]Mild [ ]Moderate [ ]Severe  Loss of appetite:              [ ]Mild [ ]Moderate [ ]Severe  Constipation:                    [ ]Mild [ ]Moderate [ ]Severe    Other Symptoms:  [ ]All other review of systems negative     Karnofsky Performance Score/Palliative Performance Status Version 2: 30 %    http://palliative.info/resource_material/PPSv2.pdf    PHYSICAL EXAM:  Vital Signs Last 24 Hrs  T(C): 36.9 (09 Jul 2021 12:00), Max: 38.3 (08 Jul 2021 16:00)  T(F): 98.5 (09 Jul 2021 12:00), Max: 100.9 (08 Jul 2021 16:00)  HR: 108 (09 Jul 2021 14:00) (62 - 108)  BP: 128/73 (09 Jul 2021 14:00) (64/41 - 136/75)  BP(mean): 91 (09 Jul 2021 14:00) (49 - 93)  RR: 14 (09 Jul 2021 14:00) (14 - 20)  SpO2: 100% (09 Jul 2021 14:00) (98% - 100%) I&O's Summary    08 Jul 2021 07:01  -  09 Jul 2021 07:00  --------------------------------------------------------  IN: 2749.6 mL / OUT: 1620 mL / NET: 1129.6 mL    09 Jul 2021 07:01  -  09 Jul 2021 14:39  --------------------------------------------------------  IN: 1351.6 mL / OUT: 395 mL / NET: 956.6 mL    GENERAL:  [x ]Alert  [ ]Oriented x   [ ]Lethargic  [ ]Cachexia  [ ]Unarousable  [ ]Verbal  [x ]Non-Verbal  Behavioral:   [ ] Anxiety  [ ] Delirium [ ] Agitation [ ] Other  HEENT:  [ ]Normal   [ ]Dry mouth   [x ]ET Tube/Trach  [ ]Oral lesions  PULMONARY:   [ ]Clear [ ]Tachypnea  [ ]Audible excessive secretions   [x ]Rhonchi        [ ]Right [ ]Left [ ]Bilateral  [ ]Crackles        [ ]Right [ ]Left [ ]Bilateral  [ ]Wheezing     [ ]Right [ ]Left [ ]Bilateral  CARDIOVASCULAR:    [x ]Regular [ ]Irregular [ ]Tachy  [ ]Tan [ ]Murmur [ ]Other  GASTROINTESTINAL:  [ x]Soft  [ ]Distended   [x ]+BS  [ ]Non tender [ ]Tender  [ ]PEG [ ]OGT/ NGT  Last BM: 7/9  GENITOURINARY:  [ ]Normal [ ] Incontinent   [ ]Oliguria/Anuria   [ x]Wan  MUSCULOSKELETAL:   [ ]Normal   [x ]Weakness  [ ]Bed/Wheelchair bound [ ]Edema  NEUROLOGIC:   [ ]No focal deficits  [ ] Cognitive impairment  [ ] Dysphagia [ ]Dysarthria [ ] Paresis [ ]Other   SKIN:   [x ]Normal   [ ]Pressure ulcer(s)  [ ]Rash    CRITICAL CARE:  [ ] Shock Present  [ ]Septic [ ]Cardiogenic [ ]Neurologic [ ]Hypovolemic  [ ]  Vasopressors [ ]  Inotropes   [x ] Respiratory failure present  [x ] Acute  [ ] Chronic [ ] Hypoxic  [ ] Hypercarbic [ ] Other  [ ] Other organ failure     GRIEF  [x ] Yes  [ ] No    LABS:                        11.3   11.79 )-----------( 218      ( 09 Jul 2021 01:14 )             34.4   07-09    138  |  101  |  16  ----------------------------<  122<H>  3.1<L>   |  26  |  0.62    Ca    8.2<L>      09 Jul 2021 01:14  Phos  0.5     07-09  Mg     1.90     07-09    TPro  5.2<L>  /  Alb  2.5<L>  /  TBili  0.7  /  DBili  x   /  AST  17  /  ALT  20  /  AlkPhos  50  07-09    RADIOLOGY & ADDITIONAL STUDIES:   < from: CT Head No Cont (07.05.21 @ 01:27) >  IMPRESSION:  No CT evidence of acute intracranial pathology.    PROTEIN CALORIE MALNUTRITION PRESENT: [ ] Yes [ ] No  [ ] PPSV2 < or = to 30% [ ] significant weight loss  [ ] poor nutritional intake [ ] catabolic state [ ] anasarca     Albumin, Serum: 2.5 g/dL (07-09-21 @ 01:14)  Artificial Nutrition [ ]     REFERRALS:   [ ]Chaplaincy  [ ] Hospice  [ ]Child Life  [ ]Social Work  [ ]Case management [ ]Holistic Therapy   Goals of Care Discussion Document:

## 2021-07-09 NOTE — PROGRESS NOTE ADULT - ASSESSMENT
70M w/ PMHx seizures, HTN, and possible ALS presenting for weakness and shortness of breath, found to be hypoxic and hypercapneic and intubated for airway protection in setting of poor inspiratory effort.     #Neuro  -on gent and propofol, wean as tolerated  -outpt w/u to eval for ALS/bekah disease (Dr. Carrasco), on home riluzole  -house neuro consulted, f/u recs  -c/w home keppra, oxcarbazepine, riluzole  -CTH neg for acute pathology  -PT eval    #Pulm  -acute on chronic hypercapneic respiratory failure, initial VBG pH 7.17, pCO2>110, bicarb 32; intubated in the ED. CXT no acute finding. Likely in the setting of motor neuron dysfunction. Considered infectious etiology but CXR neg and no reported cough, less likely at this time  -Bedside US didn't show consolidation on admission  -CXR today showed RLL consolidation on our read  -reintubated for hypoxia respiratory failure likely 2/2 aspiration; bronch today w/ scant thick secretion  -will discuss with neuro regarding etiology of his respiration status. If it is from ALS, then may need trach    #CV  -levophed restarted in the setting sedatives, wean as tolerated  -continue to monitor    #GI  -no active issue  -NPO w/ tube feed    #Renal  -elevated bicarb on admission, likely in the setting of chronic respiratory acidosis, now resolved  -retaining urine, cabrera placed today    #Endo  -FS q6h while npo on tube feed    #heme  -heparin subq    #ID  -UA neg on admission now +, started on zosyn, continue for now and adjust based on sensitivities  -CXR and bedside US no evidence of PNA, CXR today showed consolidation so c/w zosyn  -blood culture neg so far, f/u final results    #Ethics  -Full code    70M w/ PMHx seizures, HTN, and possible ALS presenting for weakness and shortness of breath, found to be hypoxic and hypercapneic and intubated for airway protection in setting of poor inspiratory effort.     #Neuro  -wean off prop, now on precedex  -outpt w/u to eval for ALS/bekah disease (Dr. Carrasco), on home riluzole  -house neuro consulted, needs to r/o other causes since ALS is a clinical dx, f/u neuro recs  -c/w home keppra, oxcarbazepine, riluzole  -CTH neg for acute pathology  -PT eval    #Pulm  -acute on chronic hypercapneic respiratory failure, initial VBG pH 7.17, pCO2>110, bicarb 32; intubated in the ED. CXT no acute finding. Likely in the setting of motor neuron dysfunction.   -Bedside US didn't show consolidation on admission  -CXR 7/8 showed RLL consolidation on our read, treating with antibiotic as below  -reintubated for hypoxia respiratory failure likely 2/2 aspiration; bronch today w/ scant thick secretion  -Discussed with neuro regarding etiology of his respiration status. ALS is high on the differential since clinical presentation and family hx. Will discuss GOC with patient when patient is able to participate in the discussion. Planned to trial another extubation and if failed, will pursue trach if inline with goal of patinet    #CV  -levophed restarted in the setting sedatives, wean as tolerated  -continue to monitor    #GI  -no active issue  -NPO w/ tube feed    #Renal  -elevated bicarb on admission, likely in the setting of chronic respiratory acidosis, now resolved  -monitor UOP    #Endo  -no active issue    #heme  -heparin subq switched to lovenox today    #ID  -UA neg on admission now +, started on zosyn, continue for now and adjust based on sensitivities  -CXR and bedside US no evidence of PNA, CXR 7/8 showed consolidation so c/w zosyn  -blood culture neg so far, f/u final results    #Ethics  -Full code    70M w/ PMHx seizures, HTN, and possible ALS presenting for weakness and shortness of breath, found to be hypoxic and hypercapneic and intubated for airway protection in setting of poor inspiratory effort.     #Neuro  -wean off prop, now on precedex  -outpt w/u to eval for ALS/bekah disease (Dr. Carrasco), on home riluzole  -house neuro consulted, needs to r/o other causes since ALS is a clinical dx, f/u neuro recs  -c/w home keppra, oxcarbazepine, riluzole  -CTH neg for acute pathology  -PT eval    #Pulm  -acute on chronic hypercapneic respiratory failure, initial VBG pH 7.17, pCO2>110, bicarb 32; intubated in the ED. CXT no acute finding. Likely in the setting of motor neuron dysfunction.   -Bedside US didn't show consolidation on admission  -CXR 7/8 showed RLL consolidation on our read, treating with antibiotic as below  -reintubated for hypoxia respiratory failure likely 2/2 aspiration; bronch today w/ scant thick secretion  -Discussed with neuro regarding etiology of his respiration status. ALS is high on the differential since clinical presentation and family hx. Will discuss GOC with patient when patient is able to participate in the discussion. Planned to trial another extubation and if failed, will pursue trach if inline with goal of patinet    #CV  -levophed restarted in the setting sedatives, wean as tolerated  -continue to monitor    #GI  -no active issue  -NPO w/ tube feed    #Renal  -elevated bicarb on admission, likely in the setting of chronic respiratory acidosis, now resolved  -monitor UOP    #Endo  -no active issue    #heme  -heparin subq switched to lovenox today    #ID  -UA neg on admission now +, started on zosyn, continue for now and adjust based on sensitivities  -CXR and bedside US no evidence of PNA, CXR 7/8 showed consolidation so c/w zosyn  -blood culture neg so far, f/u final results    #Ethics  -Full code. Palliative consulted. GOC with patient when pt is off sedation

## 2021-07-09 NOTE — CONSULT NOTE ADULT - PROBLEM SELECTOR RECOMMENDATION 4
Pt is full code  Surrogate is wife, they don't have any children. Wife's support is brother - Rafael  Recommended that as a family they should discuss what quality of life means for the pt. Currently primary team are continuing to do all that they can to improve his respiratory status, but there might come a point, specially if there is an underlying neuromuscular condition, where they might need to consider placing a trache. Brother-in-law Rafael mentioned that pt has expressed in the past that if he ended up having a diagnosis such as ALS that he would not want to live dependent on machines or for any other extraordinary measures to be taken.  Left contact number, will continue to follow and provide support  Chaplaincy services offered and accepted, referral made.

## 2021-07-09 NOTE — PROGRESS NOTE ADULT - SUBJECTIVE AND OBJECTIVE BOX
Sheryl (Antonallan Parkinson  PGY-3  NS: [phone]    INTERVAL HPI/OVERNIGHT EVENTS:    SUBJECTIVE: Patient seen and examined at bedside.     CONSTITUTIONAL: No weakness, fevers or chills  EYES/ENT: No visual changes;  No vertigo or throat pain   NECK: No pain or stiffness  RESPIRATORY: No cough, wheezing, hemoptysis; No shortness of breath  CARDIOVASCULAR: No chest pain or palpitations  GASTROINTESTINAL: No abdominal or epigastric pain. No nausea, vomiting, or hematemesis; No diarrhea or constipation. No melena or hematochezia.  GENITOURINARY: No dysuria, frequency or hematuria  NEUROLOGICAL: No numbness or weakness  SKIN: No itching, rashes    OBJECTIVE:    VITAL SIGNS:  ICU Vital Signs Last 24 Hrs  T(C): 37.6 (09 Jul 2021 04:00), Max: 38.3 (08 Jul 2021 16:00)  T(F): 99.7 (09 Jul 2021 04:00), Max: 100.9 (08 Jul 2021 16:00)  HR: 65 (09 Jul 2021 07:28) (62 - 103)  BP: 109/66 (09 Jul 2021 06:00) (64/41 - 136/75)  BP(mean): 80 (09 Jul 2021 06:00) (49 - 93)  ABP: --  ABP(mean): --  RR: 14 (09 Jul 2021 07:00) (14 - 20)  SpO2: 100% (09 Jul 2021 07:28) (96% - 100%)    Mode: AC/ CMV (Assist Control/ Continuous Mandatory Ventilation), RR (machine): 14, TV (machine): 450, FiO2: 40, PEEP: 5, ITime: 0.72, MAP: 9, PIP: 21    07-08 @ 07:01  -  07-09 @ 07:00  --------------------------------------------------------  IN: 2749.6 mL / OUT: 1620 mL / NET: 1129.6 mL      CAPILLARY BLOOD GLUCOSE      POCT Blood Glucose.: 141 mg/dL (09 Jul 2021 06:31)      PHYSICAL EXAM:    General: NAD  HEENT: NC/AT; PERRL, clear conjunctiva  Neck: supple  Respiratory: CTA b/l  Cardiovascular: +S1/S2; RRR  Abdomen: soft, NT/ND; +BS x4  Extremities: WWP, 2+ peripheral pulses b/l; no LE edema  Skin: normal color and turgor; no rash  Neurological:    MEDICATIONS:  MEDICATIONS  (STANDING):  atorvastatin 40 milliGRAM(s) Oral at bedtime  chlorhexidine 0.12% Liquid 15 milliLiter(s) Oral Mucosa every 12 hours  chlorhexidine 4% Liquid 1 Application(s) Topical <User Schedule>  donepezil 10 milliGRAM(s) Oral at bedtime  fentaNYL   Infusion. 2 MICROgram(s)/kG/Hr (14.3 mL/Hr) IV Continuous <Continuous>  heparin   Injectable 5000 Unit(s) SubCutaneous every 8 hours  latanoprost 0.005% Ophthalmic Solution 1 Drop(s) Both EYES at bedtime  levETIRAcetam 750 milliGRAM(s) Oral two times a day  norepinephrine Infusion 0.05 MICROgram(s)/kG/Min (6.68 mL/Hr) IV Continuous <Continuous>  OXcarbazepine 300 milliGRAM(s) Oral two times a day  piperacillin/tazobactam IVPB.. 3.375 Gram(s) IV Intermittent every 8 hours  potassium phosphate / sodium phosphate Powder (PHOS-NaK) 1 Packet(s) Oral three times a day  propofol Infusion 10 MICROgram(s)/kG/Min (4.28 mL/Hr) IV Continuous <Continuous>  riluzole 50 milliGRAM(s) Oral <User Schedule>    MEDICATIONS  (PRN):  acetaminophen    Suspension .. 650 milliGRAM(s) Oral every 6 hours PRN Temp greater or equal to 38C (100.4F)      ALLERGIES:  Allergies    No Known Allergies    Intolerances        LABS:                        11.3   11.79 )-----------( 218      ( 09 Jul 2021 01:14 )             34.4     07-09    138  |  101  |  16  ----------------------------<  122<H>  3.1<L>   |  26  |  0.62    Ca    8.2<L>      09 Jul 2021 01:14  Phos  0.5     07-09  Mg     1.90     07-09    TPro  5.2<L>  /  Alb  2.5<L>  /  TBili  0.7  /  DBili  x   /  AST  17  /  ALT  20  /  AlkPhos  50  07-09          RADIOLOGY & ADDITIONAL TESTS: Reviewed. Sheryl (Antonallan Parkinson  PGY-3  NS: [phone]    INTERVAL HPI/OVERNIGHT EVENTS: no     SUBJECTIVE: Patient seen and examined at bedside.     CONSTITUTIONAL: No weakness, fevers or chills  EYES/ENT: No visual changes;  No vertigo or throat pain   NECK: No pain or stiffness  RESPIRATORY: No cough, wheezing, hemoptysis; No shortness of breath  CARDIOVASCULAR: No chest pain or palpitations  GASTROINTESTINAL: No abdominal or epigastric pain. No nausea, vomiting, or hematemesis; No diarrhea or constipation. No melena or hematochezia.  GENITOURINARY: No dysuria, frequency or hematuria  NEUROLOGICAL: No numbness or weakness  SKIN: No itching, rashes    OBJECTIVE:    VITAL SIGNS:  ICU Vital Signs Last 24 Hrs  T(C): 37.6 (09 Jul 2021 04:00), Max: 38.3 (08 Jul 2021 16:00)  T(F): 99.7 (09 Jul 2021 04:00), Max: 100.9 (08 Jul 2021 16:00)  HR: 65 (09 Jul 2021 07:28) (62 - 103)  BP: 109/66 (09 Jul 2021 06:00) (64/41 - 136/75)  BP(mean): 80 (09 Jul 2021 06:00) (49 - 93)  ABP: --  ABP(mean): --  RR: 14 (09 Jul 2021 07:00) (14 - 20)  SpO2: 100% (09 Jul 2021 07:28) (96% - 100%)    Mode: AC/ CMV (Assist Control/ Continuous Mandatory Ventilation), RR (machine): 14, TV (machine): 450, FiO2: 40, PEEP: 5, ITime: 0.72, MAP: 9, PIP: 21    07-08 @ 07:01  -  07-09 @ 07:00  --------------------------------------------------------  IN: 2749.6 mL / OUT: 1620 mL / NET: 1129.6 mL      CAPILLARY BLOOD GLUCOSE      POCT Blood Glucose.: 141 mg/dL (09 Jul 2021 06:31)      PHYSICAL EXAM:    General: NAD  HEENT: NC/AT; PERRL, clear conjunctiva  Neck: supple  Respiratory: CTA b/l  Cardiovascular: +S1/S2; RRR  Abdomen: soft, NT/ND; +BS x4  Extremities: WWP, 2+ peripheral pulses b/l; no LE edema  Skin: normal color and turgor; no rash  Neurological:    MEDICATIONS:  MEDICATIONS  (STANDING):  atorvastatin 40 milliGRAM(s) Oral at bedtime  chlorhexidine 0.12% Liquid 15 milliLiter(s) Oral Mucosa every 12 hours  chlorhexidine 4% Liquid 1 Application(s) Topical <User Schedule>  donepezil 10 milliGRAM(s) Oral at bedtime  fentaNYL   Infusion. 2 MICROgram(s)/kG/Hr (14.3 mL/Hr) IV Continuous <Continuous>  heparin   Injectable 5000 Unit(s) SubCutaneous every 8 hours  latanoprost 0.005% Ophthalmic Solution 1 Drop(s) Both EYES at bedtime  levETIRAcetam 750 milliGRAM(s) Oral two times a day  norepinephrine Infusion 0.05 MICROgram(s)/kG/Min (6.68 mL/Hr) IV Continuous <Continuous>  OXcarbazepine 300 milliGRAM(s) Oral two times a day  piperacillin/tazobactam IVPB.. 3.375 Gram(s) IV Intermittent every 8 hours  potassium phosphate / sodium phosphate Powder (PHOS-NaK) 1 Packet(s) Oral three times a day  propofol Infusion 10 MICROgram(s)/kG/Min (4.28 mL/Hr) IV Continuous <Continuous>  riluzole 50 milliGRAM(s) Oral <User Schedule>    MEDICATIONS  (PRN):  acetaminophen    Suspension .. 650 milliGRAM(s) Oral every 6 hours PRN Temp greater or equal to 38C (100.4F)      ALLERGIES:  Allergies    No Known Allergies    Intolerances        LABS:                        11.3   11.79 )-----------( 218      ( 09 Jul 2021 01:14 )             34.4     07-09    138  |  101  |  16  ----------------------------<  122<H>  3.1<L>   |  26  |  0.62    Ca    8.2<L>      09 Jul 2021 01:14  Phos  0.5     07-09  Mg     1.90     07-09    TPro  5.2<L>  /  Alb  2.5<L>  /  TBili  0.7  /  DBili  x   /  AST  17  /  ALT  20  /  AlkPhos  50  07-09          RADIOLOGY & ADDITIONAL TESTS: Reviewed. Sheryl SmithAntonallan Parkinson  PGY-3  NS: [phone]    INTERVAL HPI/OVERNIGHT EVENTS: no acute event overnight. Adjusted ventilator RR 20 to 14 and pH changed from 7.60 to 7.49.    SUBJECTIVE: Patient seen and examined at bedside. Intubated and sedated    OBJECTIVE:    VITAL SIGNS:  ICU Vital Signs Last 24 Hrs  T(C): 37.6 (09 Jul 2021 04:00), Max: 38.3 (08 Jul 2021 16:00)  T(F): 99.7 (09 Jul 2021 04:00), Max: 100.9 (08 Jul 2021 16:00)  HR: 65 (09 Jul 2021 07:28) (62 - 103)  BP: 109/66 (09 Jul 2021 06:00) (64/41 - 136/75)  BP(mean): 80 (09 Jul 2021 06:00) (49 - 93)  ABP: --  ABP(mean): --  RR: 14 (09 Jul 2021 07:00) (14 - 20)  SpO2: 100% (09 Jul 2021 07:28) (96% - 100%)    Mode: AC/ CMV (Assist Control/ Continuous Mandatory Ventilation), RR (machine): 14, TV (machine): 450, FiO2: 40, PEEP: 5, ITime: 0.72, MAP: 9, PIP: 21    07-08 @ 07:01  -  07-09 @ 07:00  --------------------------------------------------------  IN: 2749.6 mL / OUT: 1620 mL / NET: 1129.6 mL      CAPILLARY BLOOD GLUCOSE      POCT Blood Glucose.: 141 mg/dL (09 Jul 2021 06:31)      PHYSICAL EXAM:    General: sedated and intubated  HEENT: NC/AT; PERRL, clear conjunctiva  Neck: supple  Respiratory: CTA b/l  Cardiovascular: +S1/S2; RRR  Abdomen: soft, NT/ND; +BS x4  Extremities: WWP, 2+ peripheral pulses b/l; no LE edema  Skin: normal color and turgor; no rash  Neurological: moving upper extremities      MEDICATIONS:  MEDICATIONS  (STANDING):  atorvastatin 40 milliGRAM(s) Oral at bedtime  chlorhexidine 0.12% Liquid 15 milliLiter(s) Oral Mucosa every 12 hours  chlorhexidine 4% Liquid 1 Application(s) Topical <User Schedule>  donepezil 10 milliGRAM(s) Oral at bedtime  fentaNYL   Infusion. 2 MICROgram(s)/kG/Hr (14.3 mL/Hr) IV Continuous <Continuous>  heparin   Injectable 5000 Unit(s) SubCutaneous every 8 hours  latanoprost 0.005% Ophthalmic Solution 1 Drop(s) Both EYES at bedtime  levETIRAcetam 750 milliGRAM(s) Oral two times a day  norepinephrine Infusion 0.05 MICROgram(s)/kG/Min (6.68 mL/Hr) IV Continuous <Continuous>  OXcarbazepine 300 milliGRAM(s) Oral two times a day  piperacillin/tazobactam IVPB.. 3.375 Gram(s) IV Intermittent every 8 hours  potassium phosphate / sodium phosphate Powder (PHOS-NaK) 1 Packet(s) Oral three times a day  propofol Infusion 10 MICROgram(s)/kG/Min (4.28 mL/Hr) IV Continuous <Continuous>  riluzole 50 milliGRAM(s) Oral <User Schedule>    MEDICATIONS  (PRN):  acetaminophen    Suspension .. 650 milliGRAM(s) Oral every 6 hours PRN Temp greater or equal to 38C (100.4F)      ALLERGIES:  Allergies    No Known Allergies    Intolerances        LABS:                        11.3   11.79 )-----------( 218      ( 09 Jul 2021 01:14 )             34.4     07-09    138  |  101  |  16  ----------------------------<  122<H>  3.1<L>   |  26  |  0.62    Ca    8.2<L>      09 Jul 2021 01:14  Phos  0.5     07-09  Mg     1.90     07-09    TPro  5.2<L>  /  Alb  2.5<L>  /  TBili  0.7  /  DBili  x   /  AST  17  /  ALT  20  /  AlkPhos  50  07-09          RADIOLOGY & ADDITIONAL TESTS: Reviewed.

## 2021-07-09 NOTE — PROGRESS NOTE ADULT - ATTENDING COMMENTS
70 M with seizures, possible ALS here with sob/weakness, found to have acute hypoxemic and hypercapnic respiratory failure of unclear etiology.    acute hypoxemic respiratory failure due to aspiration pna, possible ALS    - will sandie sedation today  as tolerated and start PS Trials  - c/w broad abx for aspiration pna and UTI  - d/w neurology, since ALS is a clinical diagnosis, would need to see how he does post-extubation.  Pre-reintubation, he had good diaphragmatic movement on sono though this was on NIPPV, so hard to really interpret it    d/w wife at bedside

## 2021-07-09 NOTE — PROGRESS NOTE ADULT - SUBJECTIVE AND OBJECTIVE BOX
[facility] [phone]        Patient seen and examined in bed.  He was on CPAP 5/5  Off pressors   Febrile     ros-unable         MEDICATIONS  (STANDING):  atorvastatin 40 milliGRAM(s) Oral at bedtime  chlorhexidine 0.12% Liquid 15 milliLiter(s) Oral Mucosa every 12 hours  chlorhexidine 4% Liquid 1 Application(s) Topical <User Schedule>  dexMEDEtomidine Infusion 0.2 MICROgram(s)/kG/Hr (3.57 mL/Hr) IV Continuous <Continuous>  donepezil 10 milliGRAM(s) Oral at bedtime  enoxaparin Injectable 40 milliGRAM(s) SubCutaneous daily  latanoprost 0.005% Ophthalmic Solution 1 Drop(s) Both EYES at bedtime  levETIRAcetam 750 milliGRAM(s) Oral two times a day  norepinephrine Infusion 0.05 MICROgram(s)/kG/Min (6.68 mL/Hr) IV Continuous <Continuous>  OXcarbazepine 300 milliGRAM(s) Oral two times a day  piperacillin/tazobactam IVPB.. 3.375 Gram(s) IV Intermittent every 8 hours  propofol Infusion 10 MICROgram(s)/kG/Min (4.28 mL/Hr) IV Continuous <Continuous>  riluzole 50 milliGRAM(s) Oral <User Schedule>      VITAL:  T(C): , Max: 38.3 (07-08-21 @ 16:00)  T(F): , Max: 100.9 (07-08-21 @ 16:00)  HR: 108 (07-09-21 @ 14:00)  BP: 128/73 (07-09-21 @ 14:00)  BP(mean): 91 (07-09-21 @ 14:00)  RR: 14 (07-09-21 @ 14:00)  SpO2: 100% (07-09-21 @ 14:00)  Wt(kg): --    I and O's:    07-08 @ 07:01  -  07-09 @ 07:00  --------------------------------------------------------  IN: 2749.6 mL / OUT: 1620 mL / NET: 1129.6 mL    07-09 @ 07:01  -  07-09 @ 15:02  --------------------------------------------------------  IN: 1351.6 mL / OUT: 395 mL / NET: 956.6 mL          PHYSICAL EXAM:    Constitutional: NAD; intubated and off sedation   Neck:  No JVD  Respiratory: CTAB/L  Cardiovascular: S1 and S2  Gastrointestinal: BS+, soft, NT/ND  Extremities: No peripheral edema  Neurological: A/O x 3, no focal deficits  Psychiatric: Normal mood, normal affect  : + Wan  Skin: No rashes  Access: Not applicable    LABS:                        11.3   11.79 )-----------( 218      ( 09 Jul 2021 01:14 )             34.4     07-09    138  |  101  |  16  ----------------------------<  122<H>  3.1<L>   |  26  |  0.62    Ca    8.2<L>      09 Jul 2021 01:14  Phos  0.5     07-09  Mg     1.90     07-09    TPro  5.2<L>  /  Alb  2.5<L>  /  TBili  0.7  /  DBili  x   /  AST  17  /  ALT  20  /  AlkPhos  50  07-09          Urine Studies:          RADIOLOGY & ADDITIONAL STUDIES:

## 2021-07-09 NOTE — PROGRESS NOTE ADULT - ASSESSMENT
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   Respiratory failure with CO2 retention and reintubated   Hypotension and   on pressors  GABINO (YAMILE criteria of serum creatinine > 0.3 elevation with reduced urine output)  Hypophosphatemia   Hypokalemia     1 Renal-  IV naphos x 2 (30mmols) and 3 packages of potassium phos;    After the infusion check labs again   2 Pulm-Reintubated and CPAP 5/5 now and possible extubation;  Question is if this fails then trach?  3 Neuro- Workup for ALS in progress(but thus far the workup is negative)  4 ID-IV abx     DW Wife in detail   DW Palliation as well     Sayed [facility]   [phone]

## 2021-07-09 NOTE — CONSULT NOTE ADULT - PROBLEM SELECTOR RECOMMENDATION 9
Unclear if it's related to his neuromuscular disorder or other etiology  On CPAP trials  Wife aware of pts current respiratory condition

## 2021-07-10 NOTE — PROGRESS NOTE ADULT - SUBJECTIVE AND OBJECTIVE BOX
INTERVAL HPI/OVERNIGHT EVENTS:    SUBJECTIVE:     OBJECTIVE:    VITAL SIGNS:  ICU Vital Signs Last 24 Hrs  T(C): 36.9 (10 Jul 2021 04:00), Max: 37.5 (09 Jul 2021 16:00)  T(F): 98.5 (10 Jul 2021 04:00), Max: 99.5 (09 Jul 2021 16:00)  HR: 100 (10 Jul 2021 07:03) (60 - 108)  BP: 96/67 (10 Jul 2021 06:00) (83/71 - 143/87)  BP(mean): 78 (10 Jul 2021 06:00) (71 - 105)  ABP: --  ABP(mean): --  RR: 22 (10 Jul 2021 06:00) (4 - 25)  SpO2: 96% (10 Jul 2021 07:03) (96% - 100%)    Mode: CPAP with PS, FiO2: 30, PEEP: 5, PS: 10, MAP: 10, PIP: 15    07-09 @ 07:01  -  07-10 @ 07:00  --------------------------------------------------------  IN: 2576.6 mL / OUT: 2070 mL / NET: 506.6 mL        PHYSICAL EXAM:  GENERAL: NAD, conversant  CHEST/LUNG: Clear to auscultation bilaterally; No crackles, rhonchi, wheezing, or rubs  HEART: Regular rate and rhythm; No murmurs, rubs, or gallops  ABDOMEN: Soft, Nontender, Nondistended; Bowel sounds present  EXTREMITIES:  2+ Peripheral Pulses, No clubbing, cyanosis, or edema  SKIN: No rashes or lesions  NERVOUS SYSTEM:  Alert & Oriented, Good concentration      LINES:                                       MEDICATIONS:  MEDICATIONS  (STANDING):  atorvastatin 40 milliGRAM(s) Oral at bedtime  chlorhexidine 0.12% Liquid 15 milliLiter(s) Oral Mucosa every 12 hours  chlorhexidine 4% Liquid 1 Application(s) Topical <User Schedule>  dexMEDEtomidine Infusion 0.2 MICROgram(s)/kG/Hr (3.57 mL/Hr) IV Continuous <Continuous>  donepezil 10 milliGRAM(s) Oral at bedtime  enoxaparin Injectable 40 milliGRAM(s) SubCutaneous daily  latanoprost 0.005% Ophthalmic Solution 1 Drop(s) Both EYES at bedtime  levETIRAcetam 750 milliGRAM(s) Oral two times a day  norepinephrine Infusion 0.05 MICROgram(s)/kG/Min (6.68 mL/Hr) IV Continuous <Continuous>  OXcarbazepine 300 milliGRAM(s) Oral two times a day  piperacillin/tazobactam IVPB.. 3.375 Gram(s) IV Intermittent every 8 hours  riluzole 50 milliGRAM(s) Oral <User Schedule>    MEDICATIONS  (PRN):  acetaminophen    Suspension .. 650 milliGRAM(s) Oral every 6 hours PRN Temp greater or equal to 38C (100.4F)      ALLERGIES:  Allergies    No Known Allergies    Intolerances        LABS:                        12.1   7.92  )-----------( 210      ( 10 Jul 2021 03:23 )             36.7     07-10    135  |  99  |  10  ----------------------------<  137<H>  3.6   |  26  |  0.52    Ca    8.2<L>      10 Jul 2021 03:23  Phos  2.5     07-10  Mg     1.90     07-10    TPro  5.7<L>  /  Alb  2.6<L>  /  TBili  0.5  /  DBili  x   /  AST  26  /  ALT  24  /  AlkPhos  57  07-10          CAPILLARY BLOOD GLUCOSE      POCT Blood Glucose.: 141 mg/dL (09 Jul 2021 06:31)      RADIOLOGY & ADDITIONAL TESTS: Reviewed. MICU PROGRESS NOTE    INTERVAL HPI/OVERNIGHT EVENTS: propofol off since 1 pm yesterday - discontinued; vent change RR 18 --> 14.    SUBJECTIVE: pt intubated, writing on paper pad, no acute complaints    OBJECTIVE:    VITAL SIGNS:  ICU Vital Signs Last 24 Hrs  T(C): 36.9 (10 Jul 2021 04:00), Max: 37.5 (09 Jul 2021 16:00)  T(F): 98.5 (10 Jul 2021 04:00), Max: 99.5 (09 Jul 2021 16:00)  HR: 100 (10 Jul 2021 07:03) (60 - 108)  BP: 96/67 (10 Jul 2021 06:00) (83/71 - 143/87)  BP(mean): 78 (10 Jul 2021 06:00) (71 - 105)  ABP: --  ABP(mean): --  RR: 22 (10 Jul 2021 06:00) (4 - 25)  SpO2: 96% (10 Jul 2021 07:03) (96% - 100%)    Mode: CPAP with PS, FiO2: 30, PEEP: 5, PS: 10, MAP: 10, PIP: 15    07-09 @ 07:01  -  07-10 @ 07:00  --------------------------------------------------------  IN: 2576.6 mL / OUT: 2070 mL / NET: 506.6 mL    PHYSICAL EXAM:  GENERAL: NAD, intubated, awake and alert, following commands  CHEST/LUNG: Clear to auscultation bilaterally; No crackles, rhonchi, wheezing, or rubs  HEART: Regular rate and rhythm; No murmurs, rubs, or gallops  ABDOMEN: Soft, Nontender, Nondistended; Bowel sounds present  EXTREMITIES:  2+ Peripheral Pulses, No clubbing, cyanosis, or edema  SKIN: No rashes or lesions  NERVOUS SYSTEM:  Alert & Oriented, Good concentration, 5/5 strength in all extremities  LINES: peripherals                                      MEDICATIONS:  MEDICATIONS  (STANDING):  atorvastatin 40 milliGRAM(s) Oral at bedtime  chlorhexidine 0.12% Liquid 15 milliLiter(s) Oral Mucosa every 12 hours  chlorhexidine 4% Liquid 1 Application(s) Topical <User Schedule>  dexMEDEtomidine Infusion 0.2 MICROgram(s)/kG/Hr (3.57 mL/Hr) IV Continuous <Continuous>  donepezil 10 milliGRAM(s) Oral at bedtime  enoxaparin Injectable 40 milliGRAM(s) SubCutaneous daily  latanoprost 0.005% Ophthalmic Solution 1 Drop(s) Both EYES at bedtime  levETIRAcetam 750 milliGRAM(s) Oral two times a day  norepinephrine Infusion 0.05 MICROgram(s)/kG/Min (6.68 mL/Hr) IV Continuous <Continuous>  OXcarbazepine 300 milliGRAM(s) Oral two times a day  piperacillin/tazobactam IVPB.. 3.375 Gram(s) IV Intermittent every 8 hours  riluzole 50 milliGRAM(s) Oral <User Schedule>    MEDICATIONS  (PRN):  acetaminophen    Suspension .. 650 milliGRAM(s) Oral every 6 hours PRN Temp greater or equal to 38C (100.4F)    ALLERGIES:  Allergies  No Known Allergies  Intolerances    LABS:                        12.1   7.92  )-----------( 210      ( 10 Jul 2021 03:23 )             36.7     07-10    135  |  99  |  10  ----------------------------<  137<H>  3.6   |  26  |  0.52    Ca    8.2<L>      10 Jul 2021 03:23  Phos  2.5     07-10  Mg     1.90     07-10    TPro  5.7<L>  /  Alb  2.6<L>  /  TBili  0.5  /  DBili  x   /  AST  26  /  ALT  24  /  AlkPhos  57  07-10    CAPILLARY BLOOD GLUCOSE  POCT Blood Glucose.: 141 mg/dL (09 Jul 2021 06:31)    Culture - Sputum . (07.08.21 @ 09:30)    Gram Stain:   Moderate polymorphonuclear leukocytes per low power field  Rare Squamous epithelial cells per low power field  No organisms seen    Specimen Source: .Sputum Sputum    Culture Results:   Normal Respiratory Afia present    RADIOLOGY & ADDITIONAL TESTS: Reviewed.

## 2021-07-10 NOTE — PROGRESS NOTE ADULT - ATTENDING COMMENTS
patient with progressive muscle/diaphragm weakness questionable ALS, intubated for acute hypercapneic, hypoxemic respiratory failure, failed extubation trial likely d/t aspiration pna and was reintubated 7/8. I d/w pt and wife regarding GOC, possible need for tracheostomy if extubation is unsuccessful. SBT, CPAP on 10/5 tolerated for 3 hours today, better than yesterday. trial 5/5 cpap if pt tolerated, repeat ABG to optimize for extubation trial. continue zosyn for pna.

## 2021-07-10 NOTE — PROGRESS NOTE ADULT - ASSESSMENT
70M w/ PMHx seizures, HTN, and possible ALS presenting for weakness and shortness of breath, found to be hypoxic and hypercapneic and intubated for airway protection in setting of poor inspiratory effort.     #Neuro  -wean off prop, now on precedex  -outpt w/u to eval for ALS/bekah disease (Dr. Carrasco), on home riluzole  -house neuro consulted, needs to r/o other causes since ALS is a clinical dx, f/u neuro recs  -c/w home keppra, oxcarbazepine, riluzole  -CTH neg for acute pathology  -PT eval    #Pulm  -acute on chronic hypercapneic respiratory failure, initial VBG pH 7.17, pCO2>110, bicarb 32; intubated in the ED. CXT no acute finding. Likely in the setting of motor neuron dysfunction.   -Bedside US didn't show consolidation on admission  -CXR 7/8 showed RLL consolidation on our read, treating with antibiotic as below  -reintubated for hypoxia respiratory failure likely 2/2 aspiration; bronch today w/ scant thick secretion  -Discussed with neuro regarding etiology of his respiration status. ALS is high on the differential since clinical presentation and family hx. Will discuss GOC with patient when patient is able to participate in the discussion. Planned to trial another extubation and if failed, will pursue trach if inline with goal of patinet    #CV  -levophed restarted in the setting sedatives, wean as tolerated  -continue to monitor    #GI  -no active issue  -NPO w/ tube feed    #Renal  -elevated bicarb on admission, likely in the setting of chronic respiratory acidosis, now resolved  -monitor UOP    #Endo  -no active issue    #heme  -heparin subq switched to lovenox today    #ID  -UA neg on admission now +, started on zosyn, continue for now and adjust based on sensitivities  -CXR and bedside US no evidence of PNA, CXR 7/8 showed consolidation so c/w zosyn  -blood culture neg so far, f/u final results    #Ethics  -Full code. Palliative consulted. GOC with patient when pt is off sedation   70M w/ PMHx seizures, HTN, and possible ALS presenting for weakness and shortness of breath, found to be hypoxic and hypercapneic and intubated for airway protection in setting of poor inspiratory effort.     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 CXT no acute finding. Likely in the setting of motor neuron dysfunction.   -Bedside US didn't show consolidation on admission  -CXR 7/8 showed RLL consolidation on our read, treating with antibiotic as below  -reintubated for hypoxia respiratory failure likely 2/2 aspiration; bronch today w/ scant thick secretion  -Discussed with neuro regarding etiology of his respiration status. ALS is high on the differential since clinical presentation and family hx. Will discuss GOC with patient when patient is able to participate in the discussion. Planned to trial another extubation and if failed, will pursue trach if inline with goal of patient  - 7/10: pt tolerated CPAP trial for most of the morning, but then became hypoxic and needed to be switched to AC/VC, thus unable to get ABG on CPAP. Reassess tomorrow.    #CV  -levophed restarted in the setting sedatives, wean as tolerated  -continue to monitor    #GI  -no active issue  -NPO w/ tube feed    #Renal  -elevated bicarb on admission, likely in the setting of chronic respiratory acidosis, now resolved  -monitor UOP    #Endo  -no active issue    #heme  -lovenox    #ID  -UA neg on admission now +, started on zosyn, continue for now and adjust based on sensitivities  -CXR and bedside US no evidence of PNA, CXR 7/8 showed consolidation so c/w zosyn  -blood culture neg so far, f/u final results    #Ethics  -Full code. Palliative consulted. GOC with patient - discussed possible tracheostomy with patient and spouse on 7/10, they will think about it.

## 2021-07-11 NOTE — PROGRESS NOTE ADULT - ASSESSMENT
70M w/ PMHx seizures, HTN, and possible ALS presenting for weakness and shortness of breath, found to be hypoxic and hypercapneic and intubated for airway protection in setting of poor inspiratory effort.     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 CXT no acute finding. Likely in the setting of motor neuron dysfunction.   -Bedside US didn't show consolidation on admission  -CXR 7/8 showed RLL consolidation on our read, treating with antibiotic as below  -reintubated for hypoxia respiratory failure likely 2/2 aspiration; bronch today w/ scant thick secretion  -Discussed with neuro regarding etiology of his respiration status. ALS is high on the differential since clinical presentation and family hx. Will discuss GOC with patient when patient is able to participate in the discussion. Planned to trial another extubation and if failed, will pursue trach if inline with goal of patient  - 7/10: pt tolerated CPAP trial for most of the morning, but then became hypoxic and needed to be switched to AC/VC, thus unable to get ABG on CPAP. Reassess tomorrow.    #CV  -levophed restarted in the setting sedatives, wean as tolerated  -continue to monitor    #GI  -no active issue  -NPO w/ tube feed    #Renal  -elevated bicarb on admission, likely in the setting of chronic respiratory acidosis, now resolved  -monitor UOP    #Endo  -no active issue    #heme  -lovenox    #ID  -UA neg on admission now +, started on zosyn, continue for now and adjust based on sensitivities  -CXR and bedside US no evidence of PNA, CXR 7/8 showed consolidation so c/w zosyn  -blood culture neg so far, f/u final results    #Ethics  -Full code. Palliative consulted. GOC with patient - discussed possible tracheostomy with patient and spouse on 7/10, they will think about it.   70M w/ PMHx seizures, HTN, and possible ALS presenting for weakness and shortness of breath, found to be hypoxic and hypercapneic and intubated for airway protection in setting of poor inspiratory effort.     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 CXT no acute finding. Likely in the setting of motor neuron dysfunction.   -Bedside US didn't show consolidation on admission  -CXR 7/8 showed RLL consolidation on our read, treating with antibiotic as below  -reintubated for hypoxia respiratory failure likely 2/2 aspiration; bronch today w/ scant thick secretion  -Discussed with neuro regarding etiology of his respiration status. ALS is high on the differential since clinical presentation and family hx. Will discuss GOC with patient when patient is able to participate in the discussion. Planned to trial another extubation and if failed, will pursue trach if inline with goal of patient  - 7/10: pt tolerated CPAP trial for most of the morning, but then became hypoxic and needed to be switched to AC/VC, thus unable to get ABG on CPAP. Reattempted CPAP this morning, pulling poor volume so back on A/C. Daily cpap trial    #CV  -levophed restarted in the setting sedatives, wean as tolerated  -continue to monitor    #GI  -no active issue  -NPO w/ tube feed    #Renal  -elevated bicarb on admission, likely in the setting of chronic respiratory acidosis, now resolved  -monitor UOP    #Endo  -no active issue    #heme  -lovenox    #ID  -UA neg on admission now +, zosyn as below  -CXR and bedside US no evidence of PNA, CXR 7/8 showed consolidation so c/w zosyn, plan for 7-day course  -blood culture neg so far, f/u final results    #Ethics  -Full code. Palliative consulted. GOC with patient - discussed possible tracheostomy with patient and spouse on 7/10, they will think about it.

## 2021-07-11 NOTE — PROGRESS NOTE ADULT - SUBJECTIVE AND OBJECTIVE BOX
Sheryl (Antonallan Parkinson  PGY-3  NS: [phone]    INTERVAL HPI/OVERNIGHT EVENTS:    SUBJECTIVE: Patient seen and examined at bedside.     CONSTITUTIONAL: No weakness, fevers or chills  EYES/ENT: No visual changes;  No vertigo or throat pain   NECK: No pain or stiffness  RESPIRATORY: No cough, wheezing, hemoptysis; No shortness of breath  CARDIOVASCULAR: No chest pain or palpitations  GASTROINTESTINAL: No abdominal or epigastric pain. No nausea, vomiting, or hematemesis; No diarrhea or constipation. No melena or hematochezia.  GENITOURINARY: No dysuria, frequency or hematuria  NEUROLOGICAL: No numbness or weakness  SKIN: No itching, rashes    OBJECTIVE:    VITAL SIGNS:  ICU Vital Signs Last 24 Hrs  T(C): 38 (11 Jul 2021 04:00), Max: 38.4 (11 Jul 2021 00:00)  T(F): 100.4 (11 Jul 2021 04:00), Max: 101.1 (11 Jul 2021 00:00)  HR: 55 (11 Jul 2021 07:18) (50 - 113)  BP: 103/65 (11 Jul 2021 07:00) (72/56 - 141/93)  BP(mean): 76 (11 Jul 2021 07:00) (58 - 107)  ABP: --  ABP(mean): --  RR: 14 (11 Jul 2021 07:00) (14 - 28)  SpO2: 97% (11 Jul 2021 07:18) (90% - 100%)    Mode: AC/ CMV (Assist Control/ Continuous Mandatory Ventilation), RR (machine): 14, TV (machine): 400, FiO2: 40, PEEP: 5, PS: , ITime: 0.71, MAP: 9, PIP: 26    07-10 @ 07:01  -  07-11 @ 07:00  --------------------------------------------------------  IN: 1028 mL / OUT: 2460 mL / NET: -1432 mL      CAPILLARY BLOOD GLUCOSE      POCT Blood Glucose.: 104 mg/dL (10 Jul 2021 18:21)      PHYSICAL EXAM:    General: NAD  HEENT: NC/AT; PERRL, clear conjunctiva  Neck: supple  Respiratory: CTA b/l  Cardiovascular: +S1/S2; RRR  Abdomen: soft, NT/ND; +BS x4  Extremities: WWP, 2+ peripheral pulses b/l; no LE edema  Skin: normal color and turgor; no rash  Neurological:    MEDICATIONS:  MEDICATIONS  (STANDING):  atorvastatin 40 milliGRAM(s) Oral at bedtime  chlorhexidine 0.12% Liquid 15 milliLiter(s) Oral Mucosa every 12 hours  chlorhexidine 4% Liquid 1 Application(s) Topical <User Schedule>  dexMEDEtomidine Infusion 0.2 MICROgram(s)/kG/Hr (3.57 mL/Hr) IV Continuous <Continuous>  donepezil 10 milliGRAM(s) Oral at bedtime  enoxaparin Injectable 40 milliGRAM(s) SubCutaneous daily  latanoprost 0.005% Ophthalmic Solution 1 Drop(s) Both EYES at bedtime  levETIRAcetam 750 milliGRAM(s) Oral two times a day  norepinephrine Infusion 0.05 MICROgram(s)/kG/Min (6.68 mL/Hr) IV Continuous <Continuous>  OXcarbazepine Suspension 300 milliGRAM(s) Oral two times a day  piperacillin/tazobactam IVPB.. 3.375 Gram(s) IV Intermittent every 8 hours  potassium phosphate IVPB 30 milliMole(s) IV Intermittent once  riluzole 50 milliGRAM(s) Oral <User Schedule>    MEDICATIONS  (PRN):  acetaminophen    Suspension .. 650 milliGRAM(s) Oral every 6 hours PRN Temp greater or equal to 38C (100.4F)      ALLERGIES:  Allergies    No Known Allergies    Intolerances        LABS:                        12.2   8.78  )-----------( 221      ( 11 Jul 2021 05:02 )             36.3     07-11    135  |  100  |  12  ----------------------------<  110<H>  3.4<L>   |  23  |  0.57    Ca    8.7      11 Jul 2021 05:02  Phos  2.4     07-11  Mg     2.00     07-11    TPro  5.8<L>  /  Alb  2.7<L>  /  TBili  0.6  /  DBili  x   /  AST  94<H>  /  ALT  78<H>  /  AlkPhos  54  07-11          RADIOLOGY & ADDITIONAL TESTS: Reviewed. Sheryl (Antonallan Parkinson  PGY-3  NS: [phone]    INTERVAL HPI/OVERNIGHT EVENTS: no acute event overnight. Got versed 2mg IVP x1 ON for     SUBJECTIVE: Patient seen and examined at bedside.     OBJECTIVE:    VITAL SIGNS:  ICU Vital Signs Last 24 Hrs  T(C): 38 (11 Jul 2021 04:00), Max: 38.4 (11 Jul 2021 00:00)  T(F): 100.4 (11 Jul 2021 04:00), Max: 101.1 (11 Jul 2021 00:00)  HR: 55 (11 Jul 2021 07:18) (50 - 113)  BP: 103/65 (11 Jul 2021 07:00) (72/56 - 141/93)  BP(mean): 76 (11 Jul 2021 07:00) (58 - 107)  ABP: --  ABP(mean): --  RR: 14 (11 Jul 2021 07:00) (14 - 28)  SpO2: 97% (11 Jul 2021 07:18) (90% - 100%)    Mode: AC/ CMV (Assist Control/ Continuous Mandatory Ventilation), RR (machine): 14, TV (machine): 400, FiO2: 40, PEEP: 5, PS: , ITime: 0.71, MAP: 9, PIP: 26    07-10 @ 07:01  -  07-11 @ 07:00  --------------------------------------------------------  IN: 1028 mL / OUT: 2460 mL / NET: -1432 mL      CAPILLARY BLOOD GLUCOSE      POCT Blood Glucose.: 104 mg/dL (10 Jul 2021 18:21)      PHYSICAL EXAM:    General: NAD  HEENT: NC/AT; PERRL, clear conjunctiva  Neck: supple  Respiratory: CTA b/l  Cardiovascular: +S1/S2; RRR  Abdomen: soft, NT/ND; +BS x4  Extremities: WWP, 2+ peripheral pulses b/l; no LE edema  Skin: normal color and turgor; no rash  Neurological:    MEDICATIONS:  MEDICATIONS  (STANDING):  atorvastatin 40 milliGRAM(s) Oral at bedtime  chlorhexidine 0.12% Liquid 15 milliLiter(s) Oral Mucosa every 12 hours  chlorhexidine 4% Liquid 1 Application(s) Topical <User Schedule>  dexMEDEtomidine Infusion 0.2 MICROgram(s)/kG/Hr (3.57 mL/Hr) IV Continuous <Continuous>  donepezil 10 milliGRAM(s) Oral at bedtime  enoxaparin Injectable 40 milliGRAM(s) SubCutaneous daily  latanoprost 0.005% Ophthalmic Solution 1 Drop(s) Both EYES at bedtime  levETIRAcetam 750 milliGRAM(s) Oral two times a day  norepinephrine Infusion 0.05 MICROgram(s)/kG/Min (6.68 mL/Hr) IV Continuous <Continuous>  OXcarbazepine Suspension 300 milliGRAM(s) Oral two times a day  piperacillin/tazobactam IVPB.. 3.375 Gram(s) IV Intermittent every 8 hours  potassium phosphate IVPB 30 milliMole(s) IV Intermittent once  riluzole 50 milliGRAM(s) Oral <User Schedule>    MEDICATIONS  (PRN):  acetaminophen    Suspension .. 650 milliGRAM(s) Oral every 6 hours PRN Temp greater or equal to 38C (100.4F)      ALLERGIES:  Allergies    No Known Allergies    Intolerances        LABS:                        12.2   8.78  )-----------( 221      ( 11 Jul 2021 05:02 )             36.3     07-11    135  |  100  |  12  ----------------------------<  110<H>  3.4<L>   |  23  |  0.57    Ca    8.7      11 Jul 2021 05:02  Phos  2.4     07-11  Mg     2.00     07-11    TPro  5.8<L>  /  Alb  2.7<L>  /  TBili  0.6  /  DBili  x   /  AST  94<H>  /  ALT  78<H>  /  AlkPhos  54  07-11          RADIOLOGY & ADDITIONAL TESTS: Reviewed. Sheryl (Antonallan Parkinson  PGY-3  NS: [phone]    INTERVAL HPI/OVERNIGHT EVENTS: no acute event overnight. Got versed 2mg IVP x1 ON for tachynpnea    SUBJECTIVE: Patient seen and examined at bedside. Writing notes on notepad. Reports feeling okay, feels pressure at the back of his throat. Feels a little lack of air    OBJECTIVE:    VITAL SIGNS:  ICU Vital Signs Last 24 Hrs  T(C): 38 (11 Jul 2021 04:00), Max: 38.4 (11 Jul 2021 00:00)  T(F): 100.4 (11 Jul 2021 04:00), Max: 101.1 (11 Jul 2021 00:00)  HR: 55 (11 Jul 2021 07:18) (50 - 113)  BP: 103/65 (11 Jul 2021 07:00) (72/56 - 141/93)  BP(mean): 76 (11 Jul 2021 07:00) (58 - 107)  ABP: --  ABP(mean): --  RR: 14 (11 Jul 2021 07:00) (14 - 28)  SpO2: 97% (11 Jul 2021 07:18) (90% - 100%)    Mode: AC/ CMV (Assist Control/ Continuous Mandatory Ventilation), RR (machine): 14, TV (machine): 400, FiO2: 40, PEEP: 5, PS: , ITime: 0.71, MAP: 9, PIP: 26    07-10 @ 07:01  -  07-11 @ 07:00  --------------------------------------------------------  IN: 1028 mL / OUT: 2460 mL / NET: -1432 mL      CAPILLARY BLOOD GLUCOSE      POCT Blood Glucose.: 104 mg/dL (10 Jul 2021 18:21)      PHYSICAL EXAM:    General: NAD, sitting up in bed writing on notepad  HEENT: NC/AT; PERRL, clear conjunctiva  Neck: supple  Respiratory: CTA b/l  Cardiovascular: +S1/S2; RRR  Abdomen: soft, NT/ND; +BS x4  Extremities: WWP, 2+ peripheral pulses b/l; no LE edema  Skin: normal color and turgor; no rash  Neurological: AOx3    MEDICATIONS:  MEDICATIONS  (STANDING):  atorvastatin 40 milliGRAM(s) Oral at bedtime  chlorhexidine 0.12% Liquid 15 milliLiter(s) Oral Mucosa every 12 hours  chlorhexidine 4% Liquid 1 Application(s) Topical <User Schedule>  dexMEDEtomidine Infusion 0.2 MICROgram(s)/kG/Hr (3.57 mL/Hr) IV Continuous <Continuous>  donepezil 10 milliGRAM(s) Oral at bedtime  enoxaparin Injectable 40 milliGRAM(s) SubCutaneous daily  latanoprost 0.005% Ophthalmic Solution 1 Drop(s) Both EYES at bedtime  levETIRAcetam 750 milliGRAM(s) Oral two times a day  norepinephrine Infusion 0.05 MICROgram(s)/kG/Min (6.68 mL/Hr) IV Continuous <Continuous>  OXcarbazepine Suspension 300 milliGRAM(s) Oral two times a day  piperacillin/tazobactam IVPB.. 3.375 Gram(s) IV Intermittent every 8 hours  potassium phosphate IVPB 30 milliMole(s) IV Intermittent once  riluzole 50 milliGRAM(s) Oral <User Schedule>    MEDICATIONS  (PRN):  acetaminophen    Suspension .. 650 milliGRAM(s) Oral every 6 hours PRN Temp greater or equal to 38C (100.4F)      ALLERGIES:  Allergies    No Known Allergies    Intolerances        LABS:                        12.2   8.78  )-----------( 221      ( 11 Jul 2021 05:02 )             36.3     07-11    135  |  100  |  12  ----------------------------<  110<H>  3.4<L>   |  23  |  0.57    Ca    8.7      11 Jul 2021 05:02  Phos  2.4     07-11  Mg     2.00     07-11    TPro  5.8<L>  /  Alb  2.7<L>  /  TBili  0.6  /  DBili  x   /  AST  94<H>  /  ALT  78<H>  /  AlkPhos  54  07-11          RADIOLOGY & ADDITIONAL TESTS: Reviewed.

## 2021-07-11 NOTE — PROGRESS NOTE ADULT - ASSESSMENT
Seen and examined at bedside  Intubated  Communicates through writing on a pad    acetaminophen    Suspension .. 650 milliGRAM(s) Oral every 6 hours PRN  atorvastatin 40 milliGRAM(s) Oral at bedtime  chlorhexidine 0.12% Liquid 15 milliLiter(s) Oral Mucosa every 12 hours  chlorhexidine 4% Liquid 1 Application(s) Topical <User Schedule>  dexMEDEtomidine Infusion 0.2 MICROgram(s)/kG/Hr IV Continuous <Continuous>  donepezil 10 milliGRAM(s) Oral at bedtime  enoxaparin Injectable 40 milliGRAM(s) SubCutaneous daily  latanoprost 0.005% Ophthalmic Solution 1 Drop(s) Both EYES at bedtime  levETIRAcetam 750 milliGRAM(s) Oral two times a day  norepinephrine Infusion 0.05 MICROgram(s)/kG/Min IV Continuous <Continuous>  OXcarbazepine Suspension 300 milliGRAM(s) Oral two times a day  piperacillin/tazobactam IVPB.. 3.375 Gram(s) IV Intermittent every 8 hours  riluzole 50 milliGRAM(s) Oral <User Schedule>      VITAL:  T(C): , Max: 38.4 (07-11-21 @ 00:00)  T(F): , Max: 101.1 (07-11-21 @ 00:00)  HR: 79 (07-11-21 @ 16:00)  BP: 144/74 (07-11-21 @ 16:00)  BP(mean): 94 (07-11-21 @ 16:00)  RR: 22 (07-11-21 @ 16:00)  SpO2: 100% (07-11-21 @ 16:00)  Wt(kg): --    07-10-21 @ 07:01  -  07-11-21 @ 07:00  --------------------------------------------------------  IN: 1028 mL / OUT: 2460 mL / NET: -1432 mL    07-11-21 @ 07:01  -  07-11-21 @ 16:25  --------------------------------------------------------  IN: 669.3 mL / OUT: 1050 mL / NET: -380.7 mL        PHYSICAL EXAM:  Constitutional: NAD; intubated and off sedation   Neck:  No JVD  Respiratory: CTAB/L  Cardiovascular: S1 and S2  Gastrointestinal: BS+, soft, NT/ND  Extremities: No peripheral edema  Neurological: A/O x 3, no focal deficits  Psychiatric: Normal mood, normal affect  : + Wan  Skin: No rashes  Access: Not applicable    LABS:                          12.2   8.78  )-----------( 221      ( 11 Jul 2021 05:02 )             36.3     Na(135)/K(3.4)/Cl(100)/HCO3(23)/BUN(12)/Cr(0.57)Glu(110)/Ca(8.7)/Mg(2.00)/PO4(2.4)    07-11 @ 05:02  Na(135)/K(3.6)/Cl(99)/HCO3(26)/BUN(10)/Cr(0.52)Glu(137)/Ca(8.2)/Mg(1.90)/PO4(2.5)    07-10 @ 03:23  Na(138)/K(3.1)/Cl(101)/HCO3(26)/BUN(16)/Cr(0.62)Glu(122)/Ca(8.2)/Mg(1.90)/PO4(0.5)    07-09 @ 01:14            ASSESSMENT/PLAN  70F w/ PMHx seizures (last 10 years ago), HTN, 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   Respiratory failure with CO2 retention and reintubated   Hypotension and   on pressors  GABINO (YAMILE criteria of serum creatinine > 0.3 elevation with reduced urine output)  Hypophosphatemia   Hypokalemia     1 Renal- renal fxn intact .      Hypophosphatemia and hyperkalemia- a/w  repletion as ordered        2 Pulm-Reintubated and CPAP 5/5 now and possible extubation;  Question is if this fails then trach?  3 Neuro- Workup for ALS in progress(but thus far the workup is negative)  4 ID-IV abx        updated wife at bedside    Kennedy Antony NP-BC  [facility]  [phone]

## 2021-07-11 NOTE — PROGRESS NOTE ADULT - ATTENDING COMMENTS
patient with progressive muscle/diaphragm weakness questionable ALS, intubated for acute hypercapneic, hypoxemic respiratory failure, failed extubation trial likely d/t aspiration pna and was reintubated 7/8, continue zosyn for pna.. I d/w pt and wife regarding GOC, possible need for tracheostomy if extubation is unsuccessful. SBT, CPAP on 10/5 - pt did not tolerate, poor TV today.

## 2021-07-12 NOTE — ADVANCED PRACTICE NURSE CONSULT - ASSESSMENT
A&O4, able to communicate with writing and make needs known (patient was on precedex during assessment), bedbound, incontinent of urine (indwelling urethral catheter) and incontinent of stool. Skin warm, dry with increased moisture in intertriginous folds, adequate skin turgor. Blanchable erythema on bilateral heels and right lateral 5th toe.    Upper lip (infranasal area), Unstageable Pressure Injury secondary to endotracheal tube alvarez: measures 1cmx1.5cmx0.1cm. Tissue type is 30% brown eschar (dry, firmly attached to wound base), 40% dried serosanguinous exudate (was able to remove some of the exudate with gentle cleansing), 30% dry pink tissue that is surrounding dried exudate and eschar. No active drainage noted. No signs of soft tissue infection, no induration, no increased warmth, no erythema. Goal of care: maintain stable eschar/scab, protect from ETT (changed position of alvarez with bedside RN), decrease/control bioburden, plastic consult.     A&O4, able to communicate with writing and make needs known (patient was on precedex during assessment), bedbound, incontinent of urine (indwelling urethral catheter) and incontinent of stool. Skin warm, dry with increased moisture in intertriginous folds, adequate skin turgor. Blanchable erythema on bilateral heels and right lateral 5th toe.    Upper lip (infranasal area), Unstageable Pressure Injury secondary to endotracheal tube alvarez complicated by critical illness and vasopressor use: measures 1cmx1.5cmx0.1cm. Tissue type is 30% brown eschar (dry, firmly attached to wound base), 40% dried serosanguinous exudate (was able to remove some of the exudate with gentle cleansing), 30% dry pink tissue that is surrounding dried exudate and eschar. No active drainage noted. No signs of soft tissue infection, no induration, no increased warmth, no erythema. Goal of care: maintain stable eschar/scab, protect from ETT (changed position of alvarez with bedside RN), decrease/control bioburden, plastic consult.    Wife at bedside, topical management and further recommendations discussed with Wife. A&O4, able to communicate with writing and make needs known (patient was on precedex during assessment), bedbound, incontinent of urine (indwelling urethral catheter) and incontinent of stool. Skin warm, dry with increased moisture in intertriginous folds, adequate skin turgor. Blanchable erythema on bilateral heels and right lateral 5th toe.    Upper lip (infranasal area), Unstageable Pressure Injury secondary to endotracheal tube alvarez complicated by critical illness and vasopressor use: measures 1cmx1.5cmx0.1cm. Tissue type is 30% brown eschar (dry, firmly attached to wound base), 40% dried serosanguinous exudate (was able to remove some of the exudate with gentle cleansing), 30% dry pink tissue that is surrounding dried exudate and eschar. No active drainage noted. No signs of soft tissue infection, no induration, no increased warmth, no erythema. Goal of care: maintain stable eschar/scab, protect from ETT (changed position of alvarez with bedside RN), decrease/control bioburden, plastic consult.    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 Sacral fold with fissure noted and exposed dermis. When patient is in natural anatomical position the areas of blanchable erythema and skin impairment (fissure) is not visible.    Wife at bedside, topical management and further recommendations discussed with Wife.

## 2021-07-12 NOTE — PROGRESS NOTE ADULT - SUBJECTIVE AND OBJECTIVE BOX
Patient Education     Low-Salt Choices  Eating salt (sodium) can make your body retain too much water. Excess water makes your heart work harder. Canned, packaged, and frozen foods are easy to prepare. But they are often high in sodium. Here are some ideas for low-salt foods you can easily make yourself.    For breakfast    Fruit or 100% fruit juice    Whole-wheat bread or an English muffin. Look for sodium content on Nutrition Facts labels.    Low-fat milk or yogurt    Unsalted eggs    Shredded wheat    Corn tortillas    Unsalted steamed rice    Regular (not instant) hot cereal, made without salt  Stay away from:    Sausage, majano, and ham    Flour tortillas    Packaged muffins, pancakes, and biscuits    Instant hot cereals    Cottage cheese  For lunch and dinner    Fresh fish, chicken, turkey, or meat--baked, broiled, or roasted without salt    Dry beans, cooked without salt    Tofu, stir-fried without salt    Unsalted fresh fruit and vegetables, or frozen or canned fruit and vegetables with no added salt  Stay away from:    Lunch or deli meat that is cured or smoked    Cheese    Tomato juice and ketchup    Canned vegetables, soups, and fish not labeled as no-salt-added or reduced sodium    Packaged gravies and sauces    Olives, pickles, and relish    Bottled salad dressings  For snacks and desserts    Yogurt    Unsalted, air-popped popcorn    Unsalted nuts or seeds  Stay away from:    Pies and cakes    Packaged dessert mixes    Pizza    Canned and packaged puddings    Pretzels, chips, crackers, and nuts--unless the label says unsalted  Date Last Reviewed: 6/1/2017 2000-2018 [email or web address]. [address] [place] [address], [place] [address]. All rights reserved. This information is not intended as a substitute for professional medical care. Always follow your healthcare professional's instructions.           Patient Education     Uncontrolled High Blood Pressure (Established)    Your blood pressure was  unusually high today. This can occur if you ve missed doses of your blood pressure medicine. Or it can happen if you are taking other medicines. These include some asthma inhalers, decongestants, diet pills, and street drugs like cocaine and amphetamine.  Other causes include:    Weight gain    More salt in your diet    Smoking    Caffeine  Your blood pressure can also rise if you are emotionally upset or in intense pain. It may go back to normal after a period of rest.  Blood pressure measurements are given as 2 numbers. Systolic blood pressure is the upper number. This is the pressure when the heart contracts. Diastolic blood pressure is the lower number. This is the pressure when the heart relaxes between beats. You will see your blood pressure readings written together. For example, a person with a systolic pressure of 118 and a diastolic pressure of 78 will have 118/78 written in the medical record. To be high blood pressure, the numbers must be higher when tested over a period of time.  Blood pressure is categorized as normal, elevated, or stage 1 or stage 2 high blood pressure:    Normal blood pressure is systolic of less than 120 and diastolic of less than 80 (120/80)    Elevated blood pressure is systolic of 120 to 129 and diastolic less than 80    Stage 1 high blood pressure is systolic is 130 to 139 or diastolic between 80 to 89    Stage 2 high blood pressure is when systolic is 140 or higher or the diastolic is 90 or higher  Uncontrolled high blood pressure can cause serious health problems. It raises your risk for heart attack, stroke, and heart failure. In general, if you have high blood pressure, keeping your blood pressure below 130/80 mmHg may help prevent these problems. Your healthcare provider may prescribe medicine to help control blood pressure if lifestyle changes are not enough.  Home care  It s important to take steps to lower your blood pressure. If you are taking blood pressure medicine,  Rafael Fabian  PGY-2 | Internal Medicine      INTERVAL HPI/OVERNIGHT EVENTS: No overnight events. Currently on Versed 0.05    SUBJECTIVE: Patient seen and examined at bedside.     CONSTITUTIONAL: No weakness, fevers or chills  EYES/ENT: No visual changes;  No vertigo or throat pain   NECK: No pain or stiffness  RESPIRATORY: No cough, wheezing, hemoptysis; No shortness of breath  CARDIOVASCULAR: No chest pain or palpitations  GASTROINTESTINAL: No abdominal or epigastric pain. No nausea, vomiting, or hematemesis; No diarrhea or constipation. No melena or hematochezia.  GENITOURINARY: No dysuria, frequency or hematuria  NEUROLOGICAL: No numbness or weakness  SKIN: No itching, rashes    OBJECTIVE:    VITAL SIGNS:  ICU Vital Signs Last 24 Hrs  T(C): 37.1 (12 Jul 2021 04:00), Max: 38.3 (11 Jul 2021 12:00)  T(F): 98.7 (12 Jul 2021 04:00), Max: 101 (11 Jul 2021 12:00)  HR: 62 (12 Jul 2021 07:00) (58 - 95)  BP: 94/66 (12 Jul 2021 07:00) (94/66 - 145/96)  BP(mean): 76 (12 Jul 2021 07:00) (75 - 111)  ABP: --  ABP(mean): --  RR: 10 (12 Jul 2021 07:00) (0 - 35)  SpO2: 100% (12 Jul 2021 07:00) (91% - 100%)    Mode: AC/ CMV (Assist Control/ Continuous Mandatory Ventilation), RR (machine): 14, TV (machine): 400, FiO2: 40, PEEP: 5, MAP: 9, PIP: 20    07-11 @ 07:01  -  07-12 @ 07:00  --------------------------------------------------------  IN: 1267.7 mL / OUT: 2690 mL / NET: -1422.3 mL      CAPILLARY BLOOD GLUCOSE      POCT Blood Glucose.: 104 mg/dL (10 Jul 2021 18:21)      PHYSICAL EXAM:    General: NAD  HEENT: NC/AT; PERRL, clear conjunctiva  Neck: supple  Respiratory: CTA b/l  Cardiovascular: +S1/S2; RRR  Abdomen: soft, NT/ND; +BS x4  Extremities: WWP, 2+ peripheral pulses b/l; no LE edema  Skin: normal color and turgor; no rash  Neurological:    MEDICATIONS:  MEDICATIONS  (STANDING):  atorvastatin 40 milliGRAM(s) Oral at bedtime  chlorhexidine 0.12% Liquid 15 milliLiter(s) Oral Mucosa every 12 hours  chlorhexidine 4% Liquid 1 Application(s) Topical <User Schedule>  dexMEDEtomidine Infusion 0.2 MICROgram(s)/kG/Hr (3.57 mL/Hr) IV Continuous <Continuous>  donepezil 10 milliGRAM(s) Oral at bedtime  enoxaparin Injectable 40 milliGRAM(s) SubCutaneous daily  latanoprost 0.005% Ophthalmic Solution 1 Drop(s) Both EYES at bedtime  levETIRAcetam 750 milliGRAM(s) Oral two times a day  norepinephrine Infusion 0.05 MICROgram(s)/kG/Min (6.68 mL/Hr) IV Continuous <Continuous>  OXcarbazepine Suspension 300 milliGRAM(s) Oral two times a day  piperacillin/tazobactam IVPB.. 3.375 Gram(s) IV Intermittent every 8 hours  riluzole 50 milliGRAM(s) Oral <User Schedule>    MEDICATIONS  (PRN):  acetaminophen    Suspension .. 650 milliGRAM(s) Oral every 6 hours PRN Temp greater or equal to 38C (100.4F)      ALLERGIES:  Allergies    No Known Allergies    Intolerances        LABS:                        11.5   8.76  )-----------( 231      ( 12 Jul 2021 06:05 )             35.2     07-12    137  |  102  |  13  ----------------------------<  88  3.7   |  22  |  0.52    Ca    8.3<L>      12 Jul 2021 06:05  Phos  2.1     07-12  Mg     1.90     07-12    TPro  5.5<L>  /  Alb  2.6<L>  /  TBili  0.5  /  DBili  x   /  AST  57<H>  /  ALT  69<H>  /  AlkPhos  53  07-12          RADIOLOGY & ADDITIONAL TESTS: Reviewed. the guidelines below may help you need less or no medicines in the future.    Start a weight-loss program if you are overweight.    Cut back on the amount of salt in your diet:  ? Avoid high-salt foods like olives, pickles, smoked meats, and salted potato chips.  ? Don t add salt to your food at the table.  ? Use only small amounts of salt when cooking.    Start an exercise program. Talk with your healthcare provider about what exercise program is best for you. It doesn t have to be difficult. Even brisk walking for 20 minutes 3 times a week is a good form of exercise.    Avoid medicines that stimulates the heart. This includes many over-the-counter cold and sinus decongestant pills and sprays, as well as diet pills. Check the warnings about high blood pressure on the label. Before purchasing any over-the-counter medicines or supplements, always ask the pharmacist about the product's potential interaction with your high blood pressure and your medicines.    Stimulants such as amphetamine or cocaine could be lethal for someone with high blood pressure. Never take these.    Limit how much caffeine you drink. Or switch to noncaffeinated beverages.    Stop smoking. If you are a long-time smoker, this can be hard. Enroll in a stop-smoking program to make it more likely that you will succeed. Talk with your provider about ways to quit.    Learn how to handle stress better. This is an important part of any program to lower blood pressure. Learn ways to relax. These include meditation, yoga, and biofeedback.    If medicines were prescribed, take them exactly as directed. Missing doses may cause your blood pressure to get out of control.    If you miss a dose or doses of your medicines, check with your healthcare provider or pharmacist about what to do.    Consider buying an automatic blood pressure machine. Your provider may recommend a certain type. You can get one of these at most pharmacies. Measure your blood pressure  twice a day, in the morning, and in the late afternoon. Keep a written record of your home blood pressure readings and take the record to your medical appointments.  Here are some additional guidelines on home blood pressure monitoring from the American [employer] Association.    Don't smoke or drink coffee for 30 minutes    Go to the bathroom before the test.    Relax for 5 minutes before taking the measurement.    Sit correctly. Be sure your back is supported. Don't sit on a couch or soft chair. Uncross your feet and place them flat on the floor. Place your arm on a solid, flat surface like a table with the upper arm at heart level. Make certain the middle of the cuff is directly above the bend of the elbow. Check the monitor's instruction manual for an illustration.    Take multiple readings. When you measure, take 2 or 3 readings one minute apart and record all of the results.    Take your blood pressure at the same time every day, or as your healthcare provider recommends.    Record the date, time, and blood pressure reading.    Take the record with you to your next appointment. If your blood pressure monitor has a built-in memory, simply take the monitor with you to your next appointment.    Call your provider if you have several high readings. Don't be frightened by a single high reading, but if you get several high readings, check in with your healthcare provider.    Note: When blood pressure reaches a systolic (top number) of 180 or higher or a diastolic (bottom number) of 110 or higher, emergency medical treatment is required. Call your healthcare provider immediately.  Follow-up care  Regular visits to your own healthcare provider for blood pressure and medicine checks are an important part of your care. Make a follow-up appointment as directed. Bring the record of your home blood pressure readings to the appointment.  When to seek medical advice  Call your healthcare provider right away if any of these  occur:    Blood pressure reaches a systolic (top number) of 180 or higher or diastolic (bottom number) of 110 or higher, emergency medical treatment is required.    Chest, arm, shoulder, neck, or upper back pain    Shortness of breath    Severe headache    Throbbing or rushing sound in the ears    Nosebleed    Extreme drowsiness, confusion, or fainting    Dizziness or dizziness with spinning sensation (vertigo)    Weakness in an arm or leg or on one side of the face    Trouble speaking or seeing   Date Last Reviewed: 1/1/2017 2000-2018 [employer]. [address] [place] [address]. All rights reserved. This information is not intended as a substitute for professional medical care. Always follow your healthcare professional's instructions.           Patient Education     Lisinopril tablets  Brand Names: Prinivil, Zestril  What is this medicine?  LISINOPRIL (lyse IN oh pril) is an ACE inhibitor. This medicine is used to treat high blood pressure and heart failure. It is also used to protect the heart immediately after a heart attack.  How should I use this medicine?  Take this medicine by mouth with a glass of water. Follow the directions on your prescription label. You may take this medicine with or without food. If it upsets your stomach, take it with food. Take your medicine at regular intervals. Do not take it more often than directed. Do not stop taking except on your doctor's advice.  Talk to your pediatrician regarding the use of this medicine in children. Special care may be needed. While this drug may be prescribed for children as young as 6 years of age for selected conditions, precautions do apply.  What side effects may I notice from receiving this medicine?  Side effects that you should report to your doctor or health care professional as soon as possible:    allergic reactions like skin rash, itching or hives, swelling of the hands, feet, face, lips, throat, or  tongue    breathing problems    signs and symptoms of kidney injury like trouble passing urine or change in the amount of urine    signs and symptoms of increased potassium like muscle weakness; chest pain; or fast, irregular heartbeat    signs and symptoms of liver injury like dark yellow or brown urine; general ill feeling or flu-like symptoms; light-colored stools; loss of appetite; nausea; right upper belly pain; unusually weak or tired; yellowing of the eyes or skin    signs and symptoms of low blood pressure like dizziness; feeling faint or lightheaded, falls; unusually weak or tired    stomach pain with or without nausea and vomiting  Side effects that usually do not require medical attention (report to your doctor or health care professional if they continue or are bothersome):    changes in taste    cough    dizziness    fever    headache    sensitivity to light  What may interact with this medicine?  Do not take this medicine with any of the following medications:    hymenoptera venom    sacubitril; valsartan  This medicines may also interact with the following medications:    aliskiren    angiotensin receptor blockers, like losartan or valsartan    certain medicines for diabetes    diuretics    everolimus    gold compounds    lithium    NSAIDs, medicines for pain and inflammation, like ibuprofen or naproxen    potassium salts or supplements    salt substitutes    sirolimus    temsirolimus  What if I miss a dose?  If you miss a dose, take it as soon as you can. If it is almost time for your next dose, take only that dose. Do not take double or extra doses.  Where should I keep my medicine?  Keep out of the reach of children.  Store at room temperature between 15 and 30 degrees C (59 and 86 degrees F). Protect from moisture. Keep container tightly closed. Throw away any unused medicine after the expiration date.  What should I tell my health care provider before I take this medicine?  They need to know if  Rafael Fabian  PGY-2 | Internal Medicine      INTERVAL HPI/OVERNIGHT EVENTS: No overnight events. Currently on Precedex 1.5.     SUBJECTIVE: Patient seen and examined at bedside.     CONSTITUTIONAL: No weakness, fevers or chills  EYES/ENT: No visual changes;  No vertigo or throat pain   NECK: No pain or stiffness  RESPIRATORY: No cough, wheezing, hemoptysis; No shortness of breath  CARDIOVASCULAR: No chest pain or palpitations  GASTROINTESTINAL: No abdominal or epigastric pain. No nausea, vomiting, or hematemesis; No diarrhea or constipation. No melena or hematochezia.  GENITOURINARY: No dysuria, frequency or hematuria  NEUROLOGICAL: No numbness or weakness  SKIN: No itching, rashes    OBJECTIVE:    VITAL SIGNS:  ICU Vital Signs Last 24 Hrs  T(C): 37.1 (12 Jul 2021 04:00), Max: 38.3 (11 Jul 2021 12:00)  T(F): 98.7 (12 Jul 2021 04:00), Max: 101 (11 Jul 2021 12:00)  HR: 62 (12 Jul 2021 07:00) (58 - 95)  BP: 94/66 (12 Jul 2021 07:00) (94/66 - 145/96)  BP(mean): 76 (12 Jul 2021 07:00) (75 - 111)  ABP: --  ABP(mean): --  RR: 10 (12 Jul 2021 07:00) (0 - 35)  SpO2: 100% (12 Jul 2021 07:00) (91% - 100%)    Mode: AC/ CMV (Assist Control/ Continuous Mandatory Ventilation), RR (machine): 14, TV (machine): 400, FiO2: 40, PEEP: 5, MAP: 9, PIP: 20    07-11 @ 07:01  -  07-12 @ 07:00  --------------------------------------------------------  IN: 1267.7 mL / OUT: 2690 mL / NET: -1422.3 mL      CAPILLARY BLOOD GLUCOSE      POCT Blood Glucose.: 104 mg/dL (10 Jul 2021 18:21)      PHYSICAL EXAM:    General: NAD  HEENT: NC/AT; PERRL, clear conjunctiva  Neck: supple  Respiratory: CTA b/l  Cardiovascular: +S1/S2; RRR  Abdomen: soft, NT/ND; +BS x4  Extremities: WWP, 2+ peripheral pulses b/l; no LE edema  Skin: normal color and turgor; no rash  Neurological:    MEDICATIONS:  MEDICATIONS  (STANDING):  atorvastatin 40 milliGRAM(s) Oral at bedtime  chlorhexidine 0.12% Liquid 15 milliLiter(s) Oral Mucosa every 12 hours  chlorhexidine 4% Liquid 1 Application(s) Topical <User Schedule>  dexMEDEtomidine Infusion 0.2 MICROgram(s)/kG/Hr (3.57 mL/Hr) IV Continuous <Continuous>  donepezil 10 milliGRAM(s) Oral at bedtime  enoxaparin Injectable 40 milliGRAM(s) SubCutaneous daily  latanoprost 0.005% Ophthalmic Solution 1 Drop(s) Both EYES at bedtime  levETIRAcetam 750 milliGRAM(s) Oral two times a day  norepinephrine Infusion 0.05 MICROgram(s)/kG/Min (6.68 mL/Hr) IV Continuous <Continuous>  OXcarbazepine Suspension 300 milliGRAM(s) Oral two times a day  piperacillin/tazobactam IVPB.. 3.375 Gram(s) IV Intermittent every 8 hours  riluzole 50 milliGRAM(s) Oral <User Schedule>    MEDICATIONS  (PRN):  acetaminophen    Suspension .. 650 milliGRAM(s) Oral every 6 hours PRN Temp greater or equal to 38C (100.4F)      ALLERGIES:  Allergies    No Known Allergies    Intolerances        LABS:                        11.5   8.76  )-----------( 231      ( 12 Jul 2021 06:05 )             35.2     07-12    137  |  102  |  13  ----------------------------<  88  3.7   |  22  |  0.52    Ca    8.3<L>      12 Jul 2021 06:05  Phos  2.1     07-12  Mg     1.90     07-12    TPro  5.5<L>  /  Alb  2.6<L>  /  TBili  0.5  /  DBili  x   /  AST  57<H>  /  ALT  69<H>  /  AlkPhos  53  07-12          RADIOLOGY & ADDITIONAL TESTS: Reviewed. you have any of these conditions:    diabetes    heart or blood vessel disease    kidney disease    low blood pressure    previous swelling of the tongue, face, or lips with difficulty breathing, difficulty swallowing, hoarseness, or tightening of the throat    an unusual or allergic reaction to lisinopril, other ACE inhibitors, insect venom, foods, dyes, or preservatives    pregnant or trying to get pregnant    breast-feeding  What should I watch for while using this medicine?  Visit your doctor or health care professional for regular check ups. Check your blood pressure as directed. Ask your doctor what your blood pressure should be, and when you should contact him or her.  Do not treat yourself for coughs, colds, or pain while you are using this medicine without asking your doctor or health care professional for advice. Some ingredients may increase your blood pressure.  Women should inform their doctor if they wish to become pregnant or think they might be pregnant. There is a potential for serious side effects to an unborn child. Talk to your health care professional or pharmacist for more information.  Check with your doctor or health care professional if you get an attack of severe diarrhea, nausea and vomiting, or if you sweat a lot. The loss of too much body fluid can make it dangerous for you to take this medicine.  You may get drowsy or dizzy. Do not drive, use machinery, or do anything that needs mental alertness until you know how this drug affects you. Do not stand or sit up quickly, especially if you are an older patient. This reduces the risk of dizzy or fainting spells. Alcohol can make you more drowsy and dizzy. Avoid alcoholic drinks.  Avoid salt substitutes unless you are told otherwise by your doctor or health care professional.  NOTE:This sheet is a summary. It may not cover all possible information. If you have questions about this medicine, talk to your doctor, pharmacist, or health care  provider. Copyright  2018 [employer]           Patient Education     Eating Heart-Healthy Food: Using the DASH Plan    Eating for your heart doesn t have to be hard or boring. You just need to know how to make healthier choices. The DASH eating plan has been developed to help you do just that. DASH stands for Dietary Approaches to Stop Hypertension. It is a plan that has been proven to be healthier for your heart and to lower your risk for high blood pressure. It can also help lower your risk for cancer, heart disease, osteoporosis, and diabetes.  Choosing from each food group  Choose foods from each of the food groups below each day. Try to get the recommended number of servings for each food group. The serving numbers are based on a diet of 2,000 calories a day. Talk to your doctor if you re unsure about your calorie needs. Along with getting the correct servings, the DASH plan also recommends a sodium intake less than 2,300 mg per day.        Grains  Servings: 6 to 8 a day  A serving is:    1 slice bread    1 ounce dry cereal    Half a cup cooked rice, pasta or cereal  Best choices: Whole grains and any grains high in fiber. Vegetables  Servings: 4 to 5 a day  A serving is:    1 cup raw leafy vegetable    Half a cup cut-up raw or cooked vegetable    Half a cup vegetable juice  Best choices: Fresh or frozen vegetables prepared without added salt or fat.   Fruits  Servings: 4 to 5 a day  A serving is:    1 medium fruit    One-quarter cup dried fruit    Half a cup fresh, frozen, or canned fruit    Half a cup of 100% fruit juices  Best choices: A variety of fresh fruits of different colors. Whole fruits are a better choice than fruit juices. Low-fat or fat-free dairy  Servings: 2 to 3 a day  A serving is:    1 cup milk    1 cup yogurt    One and a half ounces cheese  Best choices: Skim or 1% milk, low-fat or fat-free yogurt or buttermilk, and low-fat cheeses.         Lean meats, poultry, fish  Servings: 6 or fewer a  day  A serving is:    1 ounce cooked meats, poultry, or fish    1 egg  Best choices: Lean poultry and fish. Trim away visible fat. Broil, grill, roast, or boil instead of frying. Remove skin from poultry before eating. Limit how much red meat you eat.  Nuts, seeds, beans  Servings: 4 to 5 a week  A serving is:    One-third cup nuts (one and a half ounces)    2 tablespoons nut butter or seeds    Half a cup cooked dry beans or legumes  Best choices: Dry roasted nuts with no salt added, lentils, kidney beans, garbanzo beans, and whole pacheco beans.   Fats and oils  Servings: 2 to 3 a day  A serving is:    1 teaspoon vegetable oil    1 teaspoon soft margarine    1 tablespoon mayonnaise    2 tablespoons salad dressing  Best choices: Nut and vegetable oils (nontropical vegetable oils), such as olive and canola oil. Sweets  Servings: 5 a week or fewer  A serving is:    1 tablespoon sugar, maple syrup, or honey    1 tablespoon jam or jelly    1 half-ounce jelly beans (about 15)    1 cup lemonade  Best choices: Dried fruit can be a satisfying sweet. Choose low-fat sweets. And watch your serving sizes!      For more on the DASH eating plan, visit:  www.nhlbi.nih.gov/health/health-topics/topics/dash   Date Last Reviewed: 6/1/2016 2000-2018 [employer]. [address] [place] [address], [place] [address]. All rights reserved. This information is not intended as a substitute for professional medical care. Always follow your healthcare professional's instructions.            Rafael Fabian  PGY-2 | Internal Medicine      INTERVAL HPI/OVERNIGHT EVENTS: No overnight events. Currently on Precedex 1.5.     SUBJECTIVE: Patient seen and examined at bedside. Able to communicate through writing.     CONSTITUTIONAL: No weakness, fevers or chills  EYES/ENT: No visual changes;  No vertigo or throat pain   NECK: No pain or stiffness  RESPIRATORY: No cough, wheezing, hemoptysis; No shortness of breath  CARDIOVASCULAR: No chest pain or palpitations  GASTROINTESTINAL: No abdominal or epigastric pain. No nausea, vomiting, or hematemesis; No diarrhea or constipation. No melena or hematochezia.  GENITOURINARY: No dysuria, frequency or hematuria  NEUROLOGICAL: No numbness or weakness  SKIN: No itching, rashes    OBJECTIVE:    VITAL SIGNS:  ICU Vital Signs Last 24 Hrs  T(C): 37.1 (12 Jul 2021 04:00), Max: 38.3 (11 Jul 2021 12:00)  T(F): 98.7 (12 Jul 2021 04:00), Max: 101 (11 Jul 2021 12:00)  HR: 62 (12 Jul 2021 07:00) (58 - 95)  BP: 94/66 (12 Jul 2021 07:00) (94/66 - 145/96)  BP(mean): 76 (12 Jul 2021 07:00) (75 - 111)  ABP: --  ABP(mean): --  RR: 10 (12 Jul 2021 07:00) (0 - 35)  SpO2: 100% (12 Jul 2021 07:00) (91% - 100%)    Mode: AC/ CMV (Assist Control/ Continuous Mandatory Ventilation), RR (machine): 14, TV (machine): 400, FiO2: 40, PEEP: 5, MAP: 9, PIP: 20    07-11 @ 07:01  -  07-12 @ 07:00  --------------------------------------------------------  IN: 1267.7 mL / OUT: 2690 mL / NET: -1422.3 mL      CAPILLARY BLOOD GLUCOSE      POCT Blood Glucose.: 104 mg/dL (10 Jul 2021 18:21)      PHYSICAL EXAM:    General: NAD, intubated, A/O x 3, communicated through writing   HEENT: NC/AT; PERRL, clear conjunctiva  Neck: supple  Respiratory: CTA b/l  Cardiovascular: +S1/S2; RRR  Abdomen: soft, NT/ND; +BS x4, cabrera catheter in place draining clear urine   Extremities: WWP, 2+ peripheral pulses b/l; no LE edema  Skin: normal color and turgor; no rash      MEDICATIONS:  MEDICATIONS  (STANDING):  atorvastatin 40 milliGRAM(s) Oral at bedtime  chlorhexidine 0.12% Liquid 15 milliLiter(s) Oral Mucosa every 12 hours  chlorhexidine 4% Liquid 1 Application(s) Topical <User Schedule>  dexMEDEtomidine Infusion 0.2 MICROgram(s)/kG/Hr (3.57 mL/Hr) IV Continuous <Continuous>  donepezil 10 milliGRAM(s) Oral at bedtime  enoxaparin Injectable 40 milliGRAM(s) SubCutaneous daily  latanoprost 0.005% Ophthalmic Solution 1 Drop(s) Both EYES at bedtime  levETIRAcetam 750 milliGRAM(s) Oral two times a day  norepinephrine Infusion 0.05 MICROgram(s)/kG/Min (6.68 mL/Hr) IV Continuous <Continuous>  OXcarbazepine Suspension 300 milliGRAM(s) Oral two times a day  piperacillin/tazobactam IVPB.. 3.375 Gram(s) IV Intermittent every 8 hours  riluzole 50 milliGRAM(s) Oral <User Schedule>    MEDICATIONS  (PRN):  acetaminophen    Suspension .. 650 milliGRAM(s) Oral every 6 hours PRN Temp greater or equal to 38C (100.4F)      ALLERGIES:  Allergies    No Known Allergies    Intolerances        LABS:                        11.5   8.76  )-----------( 231      ( 12 Jul 2021 06:05 )             35.2     07-12    137  |  102  |  13  ----------------------------<  88  3.7   |  22  |  0.52    Ca    8.3<L>      12 Jul 2021 06:05  Phos  2.1     07-12  Mg     1.90     07-12    TPro  5.5<L>  /  Alb  2.6<L>  /  TBili  0.5  /  DBili  x   /  AST  57<H>  /  ALT  69<H>  /  AlkPhos  53  07-12          RADIOLOGY & ADDITIONAL TESTS: Reviewed.

## 2021-07-12 NOTE — ADVANCED PRACTICE NURSE CONSULT - REASON FOR CONSULT
Patient seen on skin care rounds after wound care referral received for assessment of skin impairment and recommendations of topical management. Chart reviewed: Serum WBC 8.76, noted leukocytosis 17.33 during admission, Jordan range 10-14, Jordan was 12 on admission and most current is 14, LACE 8, H/H 11.5/35.2, BMI 23.2kg/m2. Patient H/O seizures, HTN, and possible ALS presenting for weakness and shortness of breath, found to be hypoxic and hypercapnic and intubated for airway protection in setting of poor inspiratory effort. As per MICU notes, patient is now weaned off propofol and now on precedex. Outpt w/u to eval for ALS/bekah disease (Dr. Carrasco), on home riluzole. CTH neg for acute pathology.  Acute on chronic hypercapnic respiratory failure, intubated in the ED. CXT no acute finding. Likely in the setting of motor neuron dysfunction. CXR 7/8 showed RLL consolidation on our read, treating with antibiotic as below, reintubated for hypoxia respiratory failure likely 2/2 aspiration; bronch today w/ scant thick secretion.  Discussed with neuro regarding etiology of his respiration status. ALS is high on the differential since clinical presentation and family hx. Will discuss GOC with patient when patient is able to participate in the discussion. Planned to trial another extubation and if failed, will pursue trach if inline with goal of patient. 7/10: pt tolerated CPAP trial for most of the morning, but then became hypoxic and needed to be switched to AC/VC, thus unable to get ABG on CPAP. Reattempted CPAP this morning, pulling poor volume so back on A/C. Daily cpap trial.  Patient has been on vasopressors, levophed restarted in the setting sedatives. NPO w/ tube feed (seen by dietary services, severe protein and calorie malnutrition as per assessment).

## 2021-07-12 NOTE — PROGRESS NOTE ADULT - ATTENDING COMMENTS
Agree with above. Seen and examined with team on rounds. Critically ill requiring frequent bedside visits. ALS variant with bulbar symptoms. Needs PT/OT, supportive care. Mobilize with vent.

## 2021-07-12 NOTE — PROGRESS NOTE ADULT - SUBJECTIVE AND OBJECTIVE BOX
Overnight events noted   VITAL: T(C): , Max: 38.3 (07-12-21 @ 12:00) T(F): , Max: 101 (07-12-21 @ 12:00) HR: 121 (07-12-21 @ 16:00) BP: 148/81 (07-12-21 @ 16:00) BP(mean): 90 (07-12-21 @ 16:00) RR: 20 (07-12-21 @ 16:00) SpO2: 98% (07-12-21 @ 16:00)   PHYSICAL EXAM: Constitutional: NAD; intubated and off sedation  Neck:  No JVD Respiratory: CTAB/L Cardiovascular: S1 and S2 Gastrointestinal: BS+, soft, NT/ND Extremities: No peripheral edema Neurological: A/O x 3, no focal deficits Psychiatric: Normal mood, normal affect : + Wan Skin: No rashes LABS:                      11.5  8.76  )-----------( 231      ( 12 Jul 2021 06:05 )            35.2   Na(137)/K(3.7)/Cl(102)/HCO3(22)/BUN(13)/Cr(0.52)Glu(88)/Ca(8.3)/Mg(1.90)/PO4(2.1)    07-12 @ 06:05 Na(135)/K(3.4)/Cl(100)/HCO3(23)/BUN(12)/Cr(0.57)Glu(110)/Ca(8.7)/Mg(2.00)/PO4(2.4)    07-11 @ 05:02 Na(135)/K(3.6)/Cl(99)/HCO3(26)/BUN(10)/Cr(0.52)Glu(137)/Ca(8.2)/Mg(1.90)/PO4(2.5)    07-10 @ 03:23   ASSESSMENT: 70F w/ HTN, seizures, and recent diagnosis of motor neuron disease (?ALS), 7/4/21 a/w weakness/hypercapnic respiratory failure (1)Renal - stable function (2)Lytes - highly acceptable (3)Pulm - hypercapnic resp failure - remains vent-dependent  RECOMMEND: (1)Vent management per MICU (2)Neuro f/u (3)Meds for GFR >60     Rachid Dunn MD [facility] Office: [phone] Cell: [phone]        remains on vent; sedated on Precedex   VITAL: T(C): , Max: 38.3 (07-12-21 @ 12:00) T(F): , Max: 101 (07-12-21 @ 12:00) HR: 121 (07-12-21 @ 16:00) BP: 148/81 (07-12-21 @ 16:00) BP(mean): 90 (07-12-21 @ 16:00) RR: 20 (07-12-21 @ 16:00) SpO2: 98% (07-12-21 @ 16:00)   PHYSICAL EXAM: Constitutional: intubated/sedated on Precedex Neck:  No JVD Respiratory: CTAB/L Cardiovascular: S1 and S2 Gastrointestinal: BS+, soft, NT/ND Extremities: No peripheral edema Neurological: tone WNL : + Wan Skin: No rashes  LABS:                      11.5  8.76  )-----------( 231      ( 12 Jul 2021 06:05 )            35.2   Na(137)/K(3.7)/Cl(102)/HCO3(22)/BUN(13)/Cr(0.52)Glu(88)/Ca(8.3)/Mg(1.90)/PO4(2.1)    07-12 @ 06:05 Na(135)/K(3.4)/Cl(100)/HCO3(23)/BUN(12)/Cr(0.57)Glu(110)/Ca(8.7)/Mg(2.00)/PO4(2.4)    07-11 @ 05:02 Na(135)/K(3.6)/Cl(99)/HCO3(26)/BUN(10)/Cr(0.52)Glu(137)/Ca(8.2)/Mg(1.90)/PO4(2.5)    07-10 @ 03:23   ASSESSMENT: 70F w/ HTN, seizures, and recent diagnosis of motor neuron disease (?ALS), 7/4/21 a/w weakness/hypercapnic respiratory failure (1)Renal - stable function (2)Lytes - highly acceptable (3)Pulm - hypercapnic resp failure - remains vent-dependent  RECOMMEND: (1)Vent management per MICU (2)Neuro f/u (3)Meds for GFR >60     Rachid Dunn MD [facility] Office: [phone] Cell: [phone]        remains on vent; on Precedex complains of being warm   VITAL: T(C): , Max: 38.3 (07-12-21 @ 12:00) T(F): , Max: 101 (07-12-21 @ 12:00) HR: 121 (07-12-21 @ 16:00) BP: 148/81 (07-12-21 @ 16:00) BP(mean): 90 (07-12-21 @ 16:00) RR: 20 (07-12-21 @ 16:00) SpO2: 98% (07-12-21 @ 16:00)   PHYSICAL EXAM: Constitutional: intubated/alert/NAD HEENT: (+)OGT/ETT Neck:  No JVD Respiratory: CTAB/L Cardiovascular: S1 and S2 Gastrointestinal: BS+, soft, NT/ND Extremities: No peripheral edema Neurological: tone WNL : no cabrera Skin: No rashes  LABS:                      11.5  8.76  )-----------( 231      ( 12 Jul 2021 06:05 )            35.2   Na(137)/K(3.7)/Cl(102)/HCO3(22)/BUN(13)/Cr(0.52)Glu(88)/Ca(8.3)/Mg(1.90)/PO4(2.1)    07-12 @ 06:05 Na(135)/K(3.4)/Cl(100)/HCO3(23)/BUN(12)/Cr(0.57)Glu(110)/Ca(8.7)/Mg(2.00)/PO4(2.4)    07-11 @ 05:02 Na(135)/K(3.6)/Cl(99)/HCO3(26)/BUN(10)/Cr(0.52)Glu(137)/Ca(8.2)/Mg(1.90)/PO4(2.5)    07-10 @ 03:23   ASSESSMENT: 70F w/ HTN, seizures, and recent diagnosis of motor neuron disease (?ALS), 7/4/21 a/w weakness/hypercapnic respiratory failure  (1)Renal - stable function (2)Lytes - highly acceptable (3)Pulm - hypercapnic resp failure - remains vent-dependent (4) - undergoing TOV  RECOMMEND: (1)Vent management per MICU (2)Neuro f/u (3)TOV as ordered (4)Meds for GFR >60     Rachid Dunn MD [facility] Office: [phone] Cell: [phone]

## 2021-07-12 NOTE — ADVANCED PRACTICE NURSE CONSULT - RECOMMEDATIONS
Recommend Plastics consult for evaluation of infranasal skin impairment.    Upper lip (infranasal): Apply Betadine daily, allow to dry. Daily.    Continue low air loss bed therapy, continue heel elevation, continue to turn & reposition q2h, continue moisture management with barrier creams & single breathable pad, continue measures to decrease friction/shear/pressure. Continue with nutritional support as per dietary/orders.     Please call wound care service line is further assistance is needed ([phone]).

## 2021-07-12 NOTE — PROGRESS NOTE ADULT - ASSESSMENT
70M w/ PMHx seizures, HTN, and possible ALS presenting for weakness and shortness of breath, found to be hypoxic and hypercapneic and intubated for airway protection in setting of poor inspiratory effort.     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 CXT no acute finding. Likely in the setting of motor neuron dysfunction.   -Bedside US didn't show consolidation on admission  -CXR 7/8 showed RLL consolidation on our read, treating with antibiotic as below  -reintubated for hypoxia respiratory failure likely 2/2 aspiration; bronch today w/ scant thick secretion  -Discussed with neuro regarding etiology of his respiration status. ALS is high on the differential since clinical presentation and family hx. Will discuss GOC with patient when patient is able to participate in the discussion. Planned to trial another extubation and if failed, will pursue trach if inline with goal of patient  - 7/10: pt tolerated CPAP trial for most of the morning, but then became hypoxic and needed to be switched to AC/VC, thus unable to get ABG on CPAP. Reattempted CPAP this morning, pulling poor volume so back on A/C. Daily cpap trial    #CV  -levophed restarted in the setting sedatives, wean as tolerated  -continue to monitor    #GI  -no active issue  -NPO w/ tube feed    #Renal  -elevated bicarb on admission, likely in the setting of chronic respiratory acidosis, now resolved  -monitor UOP    #Endo  -no active issue    #heme  -lovenox    #ID  -UA neg on admission now +, zosyn as below  -CXR and bedside US no evidence of PNA, CXR 7/8 showed consolidation so c/w zosyn, plan for 7-day course  -blood culture neg so far, f/u final results    #Ethics  -Full code. Palliative consulted. GOC with patient - discussed possible tracheostomy with patient and spouse on 7/10, they will think about it.   70M w/ PMHx seizures, HTN, and possible ALS presenting for weakness and shortness of breath, found to be hypoxic and hypercapneic and intubated for airway protection in setting of poor inspiratory effort.     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 CXT no acute finding. Likely in the setting of motor neuron dysfunction.   -Bedside US didn't show consolidation on admission  -CXR 7/8 showed RLL consolidation on our read, treating with antibiotic as below  -reintubated for hypoxia respiratory failure likely 2/2 aspiration; bronch today w/ scant thick secretion  -Discussed with neuro regarding etiology of his respiration status. ALS is high on the differential since clinical presentation and family hx. Will discuss GOC with patient when patient is able to participate in the discussion. Planned to trial another extubation and if failed, will pursue trach if inline with goal of patient  - 7/10: pt tolerated CPAP trial for most of the morning, but then became hypoxic and needed to be switched to AC/VC, thus unable to get ABG on CPAP. Reattempted CPAP this morning, pulling poor volume so back on A/C. Daily cpap trial     #CV  -levophed restarted in the setting sedatives, wean as tolerated  -continue to monitor    #GI  -no active issue  -NPO w/ tube feed    #Renal  -elevated bicarb on admission, likely in the setting of chronic respiratory acidosis, now resolved  - discontinue cabrera 7/12  - monitor urine output    #Endo  -no active issue    #heme  -lovenox    #ID  -UA neg on admission now +, zosyn as below  -CXR and bedside US no evidence of PNA, CXR 7/8 showed consolidation so c/w zosyn, plan for 7-day course   -blood culture neg so far, f/u final results    #Ethics  -Full code. Palliative consulted. GOC with patient - discussed possible tracheostomy with patient and spouse on 7/10, they will think about it.

## 2021-07-13 NOTE — PHYSICAL THERAPY INITIAL EVALUATION ADULT - MANUAL MUSCLE TESTING RESULTS, REHAB EVAL
both UE / LE 3-/5
bilateral UEs grossly 4/5 except bilateral shoulder flexion 3-/5, bilateral LEs grossly 4/5

## 2021-07-13 NOTE — OCCUPATIONAL THERAPY INITIAL EVALUATION ADULT - PERTINENT HX OF CURRENT PROBLEM, REHAB EVAL
Patient is a 70 year old male admitted to [facility] for weakness and shortness of breath, found to be hypoxic and hypercapneic and intubated for airway protection in setting of poor inspiratory effort. PMH: seizures, HTN, and possible ALS.

## 2021-07-13 NOTE — PHYSICAL THERAPY INITIAL EVALUATION ADULT - PATIENT PROFILE REVIEW, REHAB EVAL
PT orders received: increase as tolerated. Consult with RN Pam LANG, pt may participate in PT evaluation./yes
yes

## 2021-07-13 NOTE — PHYSICAL THERAPY INITIAL EVALUATION ADULT - GENERAL OBSERVATIONS, REHAB EVAL
Patient seen semi supine in bed in MICU, intubated but alert, able to follow command.
Patient was received semi-supine in bed in NAD, wife at bedside, +IV, +bi-pap.

## 2021-07-13 NOTE — PHYSICAL THERAPY INITIAL EVALUATION ADULT - PERTINENT HX OF CURRENT PROBLEM, REHAB EVAL
Patient is a 70 year old male admitted to [facility] for weakness and shortness of breath, found to be hypoxic and hypercapneic and intubated for airway protection in setting of poor inspiratory effort. PMH: seizures, HTN, and possible ALS.
Patient is 70 year old male admitted PMHx seizures, HTN, and possible ALS presenting for weakness and shortness of breath, found to be hypoxic and hypercapneic and intubated for airway protection in setting of poor inspiratory effort.

## 2021-07-13 NOTE — PROGRESS NOTE ADULT - ASSESSMENT
70M w/ PMHx seizures, HTN, and possible ALS presenting for weakness and shortness of breath, found to be hypoxic and hypercapneic and intubated for airway protection in setting of poor inspiratory effort.     #Neuro  - currently on precedex   -outpt w/u to eval for ALS/bekah disease (Dr. Carrasco), on home riluzole  -house neuro consulted, needs to r/o other causes since ALS is a clinical dx, f/u neuro recs  -c/w home keppra, oxcarbazepine, riluzole  -CTH neg for acute pathology  -PT eval when extubated    #Pulm  -acute on chronic hypercapneic respiratory failure, initial VBG pH 7.17, pCO2>110, bicarb 32; intubated in the ED. CXT no acute finding. Likely in the setting of motor neuron dysfunction.   -Bedside US didn't show consolidation on admission  -CXR 7/8 showed RLL consolidation on our read, treating with antibiotic as below  -reintubated for hypoxia respiratory failure likely 2/2 aspiration; bronch today w/ scant thick secretion  -Discussed with neuro regarding etiology of his respiration status. ALS is high on the differential since clinical presentation and family hx. Will discuss GOC with patient when patient is able to participate in the discussion. Planned to trial another extubation and if failed, will pursue trach if inline with goal of patient  - 7/10: pt tolerated CPAP trial for most of the morning, but then became hypoxic and needed to be switched to AC/VC, thus unable to get ABG on CPAP. Reattempted CPAP this morning, pulling poor volume so back on A/C. Daily cpap trial     #CV  -levophed restarted in the setting sedatives, wean as tolerated  -continue to monitor    #GI  -no active issue  -NPO w/ tube feed    #Renal  -elevated bicarb on admission, likely in the setting of chronic respiratory acidosis, now resolved  - discontinue cabrera TODAY  - monitor urine output    #Endo  -no active issue    #heme  -lovenox    #ID  -UA neg on admission now +, zosyn as below  -CXR and bedside US no evidence of PNA, CXR 7/8 showed consolidation so c/w zosyn, plan for 7-day course   -blood culture neg so far, f/u final results    #Ethics  -Full code. Palliative consulted. GOC with patient - discussed possible tracheostomy with patient and spouse on 7/10, they will think about it.

## 2021-07-13 NOTE — PHYSICAL THERAPY INITIAL EVALUATION ADULT - IMPAIRMENTS FOUND, PT EVAL
aerobic capacity/endurance/gait, locomotion, and balance/muscle strength
aerobic capacity/endurance/gait, locomotion, and balance/muscle strength/posture

## 2021-07-13 NOTE — PHYSICAL THERAPY INITIAL EVALUATION ADULT - DISCHARGE DISPOSITION, PT EVAL
Patient will benefit from inpatient restorative rehab at this time to improve functional mobility and strength to allow patient to return to baseline functional status./rehabilitation facility
Please follow PT note once functional mobility is assessed

## 2021-07-13 NOTE — PHYSICAL THERAPY INITIAL EVALUATION ADULT - ADDITIONAL COMMENTS
Patient lives with wife in house, 2 steps to enter, ambulated with a walker, as per patient's wife.
Patient lives with his wife in a private house, 2 steps to enter. Patient uses rolling walker for indoor ambulation and rollator for community ambulation. Patient requires assistance with ADLs from wife.    Patient was left safely in bed, all lines/tubes intact and call bell within reach, RN aware

## 2021-07-13 NOTE — PROGRESS NOTE ADULT - SUBJECTIVE AND OBJECTIVE BOX
Rafael Fabian  PGY-2 | Internal Medicine      INTERVAL HPI/OVERNIGHT EVENTS: No acute overnight events.     SUBJECTIVE: Patient seen and examined at bedside.     CONSTITUTIONAL: No weakness, fevers or chills  EYES/ENT: No visual changes;  No vertigo or throat pain   NECK: No pain or stiffness  RESPIRATORY: No cough, wheezing, hemoptysis; No shortness of breath  CARDIOVASCULAR: No chest pain or palpitations  GASTROINTESTINAL: No abdominal or epigastric pain. No nausea, vomiting, or hematemesis; No diarrhea or constipation. No melena or hematochezia.  GENITOURINARY: No dysuria, frequency or hematuria  NEUROLOGICAL: No numbness or weakness  SKIN: No itching, rashes    OBJECTIVE:    VITAL SIGNS:  ICU Vital Signs Last 24 Hrs  T(C): 37.6 (13 Jul 2021 04:00), Max: 38.3 (12 Jul 2021 12:00)  T(F): 99.7 (13 Jul 2021 04:00), Max: 101 (12 Jul 2021 12:00)  HR: 72 (13 Jul 2021 07:00) (63 - 121)  BP: 125/67 (13 Jul 2021 06:00) (69/50 - 149/101)  BP(mean): 83 (13 Jul 2021 06:00) (64 - 118)  ABP: --  ABP(mean): --  RR: 13 (13 Jul 2021 07:00) (13 - 34)  SpO2: 95% (13 Jul 2021 07:00) (90% - 100%)    Mode: AC/ CMV (Assist Control/ Continuous Mandatory Ventilation), RR (machine): 14, TV (machine): 400, FiO2: 40, PEEP: 5, MAP: 9, PIP: 25    07-12 @ 07:01  -  07-13 @ 07:00  --------------------------------------------------------  IN: 891.2 mL / OUT: 1770 mL / NET: -878.8 mL      CAPILLARY BLOOD GLUCOSE          PHYSICAL EXAM:    General: NAD  HEENT: NC/AT; PERRL, clear conjunctiva  Neck: supple  Respiratory: CTA b/l  Cardiovascular: +S1/S2; RRR  Abdomen: soft, NT/ND; +BS x4  Extremities: WWP, 2+ peripheral pulses b/l; no LE edema  Skin: normal color and turgor; no rash  Neurological:    MEDICATIONS:  MEDICATIONS  (STANDING):  atorvastatin 40 milliGRAM(s) Oral at bedtime  chlorhexidine 0.12% Liquid 15 milliLiter(s) Oral Mucosa every 12 hours  chlorhexidine 4% Liquid 1 Application(s) Topical <User Schedule>  dexMEDEtomidine Infusion 0.2 MICROgram(s)/kG/Hr (3.57 mL/Hr) IV Continuous <Continuous>  diazepam  Injectable 2 milliGRAM(s) IV Push once  donepezil 10 milliGRAM(s) Oral at bedtime  enoxaparin Injectable 40 milliGRAM(s) SubCutaneous daily  latanoprost 0.005% Ophthalmic Solution 1 Drop(s) Both EYES at bedtime  levETIRAcetam 750 milliGRAM(s) Oral two times a day  OXcarbazepine Suspension 300 milliGRAM(s) Oral two times a day  piperacillin/tazobactam IVPB.. 3.375 Gram(s) IV Intermittent every 8 hours  riluzole 50 milliGRAM(s) Oral <User Schedule>    MEDICATIONS  (PRN):  acetaminophen    Suspension .. 650 milliGRAM(s) Oral every 6 hours PRN Temp greater or equal to 38C (100.4F)      ALLERGIES:  Allergies    No Known Allergies    Intolerances        LABS:                        11.5   8.89  )-----------( 262      ( 13 Jul 2021 06:50 )             35.2     07-13    136  |  100  |  17  ----------------------------<  130<H>  3.5   |  26  |  0.55    Ca    8.6      13 Jul 2021 06:32  Phos  1.8     07-13  Mg     2.00     07-13    TPro  5.8<L>  /  Alb  2.9<L>  /  TBili  0.4  /  DBili  x   /  AST  53<H>  /  ALT  70<H>  /  AlkPhos  60  07-13          RADIOLOGY & ADDITIONAL TESTS: Reviewed.

## 2021-07-13 NOTE — OCCUPATIONAL THERAPY INITIAL EVALUATION ADULT - PLANNED THERAPY INTERVENTIONS, OT EVAL
ADL retraining/balance training/bed mobility training/fine motor coordination training/motor coordination training/neuromuscular re-education/parent/caregiver training.../ROM/strengthening/transfer training

## 2021-07-14 NOTE — PROCEDURE NOTE - NSPROCNAME_GEN_A_CORE
Peripheral Line Insertion
Tracheal Intubation
Peripheral Line Insertion
Gastric Intubation/Gastric Lavage
Gastric Intubation/Gastric Lavage
Peripheral Line Insertion
Tracheal Intubation

## 2021-07-14 NOTE — DISCHARGE NOTE FOR THE EXPIRED PATIENT - NS EXPIRED FAMCONTACTED
Hospital Medicine Discharge Summary    Patient ID: Alex Lewis      Patient's PCP: Steph Hanson MD    Admit Date: 12/22/2020     Discharge Date: 12/23/2020     Admitting Physician: Karlene Byrne MD     Discharge Physician: Katy Garcia MD     Discharge Diagnoses: Active Hospital Problems    Diagnosis Date Noted    Chest pain [R07.9] 12/22/2020       The patient was seen and examined on day of discharge and this discharge summary is in conjunction with any daily progress note from day of discharge. Condition at discharge - stable    Hospital Course: patient seen and evaluated on the day of discharge. Patient informed about following up with appointments. Patient verbalized understanding for follow-up appointments. All questions of the patient answered, patient is in agreement with the discharge plan. Medical reconciliation performed. Patient will be discharged stable condition. A 62-year-old -American female who  presented to the hospital with chief complaint of one-day history of  what she described as sudden onset of retrosternal chest pain with  left-sided radiation and radiation to the left aspect of the face with  concomitant shortness of breath without nausea or vomiting, fevers or  chills. The patient notes that the chest pain is currently constantly  present, has not vomited, and no current shortness of breath. The  patient is comfortable in bed and is able to answer and talk in full  sentences appropriately. No acute ECG changes seen  Normal troponin levels     No ACS.     No further cardiology work up or consult is required. Discharge Diagnosis  1. Chest pain, ACS ruled out, mundo GIMENEZK related  2. Coronary artery disease. 3.  Hypertension. 4.  Dyslipidemia. 5.  Severe chronic migraine.       Exam:     /73   Pulse 66   Temp 98.2 °F (36.8 °C) (Oral)   Resp 16   Ht 5' (1.524 m)   Wt 123 lb 3.8 oz (55.9 kg)   SpO2 98%   BMI 24.07 wife at bedside/yes TAKE 1 CAPSULE BY MOUTH DAILY, Disp-30 capsule, R-1Normal      rizatriptan (MAXALT) 10 MG tablet Take 1 tablet by mouth once as needed for Migraine May repeat in 2 hours if needed, Disp-9 tablet, R-0Normal      Erenumab-aooe (AIMOVIG, 140 MG DOSE,) 70 MG/ML SOAJ Inject 140 mLs into the skin every 30 days, Disp-1 pen,R-1Normal      QUEtiapine (SEROQUEL) 25 MG tablet Take 1-2 tablets by mouth nightly, Disp-60 tablet,R-1Normal      insulin glargine (BASAGLAR KWIKPEN) 100 UNIT/ML injection pen Inject 40 Units into the skin nightly, Disp-15 mL,R-5Normal      hydrALAZINE (APRESOLINE) 50 MG tablet Take 1 tablet by mouth 2 times daily, Disp-60 tablet,R-5Normal      metoprolol succinate (TOPROL XL) 50 MG extended release tablet Take 0.5 tablets by mouth 2 times daily (with meals), Disp-30 tablet,R-5Normal      isosorbide mononitrate (IMDUR) 30 MG extended release tablet Take 1 tablet by mouth daily, Disp-90 tablet,R-5Normal      atorvastatin (LIPITOR) 80 MG tablet Take 1 tablet by mouth nightly, Disp-90 tablet,R-5Normal      amLODIPine (NORVASC) 5 MG tablet Take 1 tablet by mouth 2 times daily, Disp-180 tablet,R-5Normal      lidocaine (XYLOCAINE) 5 % ointment Apply topically as needed. , Disp-5 g,R-3, Normal      blood glucose test strips (TRUE METRIX BLOOD GLUCOSE TEST) strip 1 each by Does not apply route 2 times daily, Disp-100 each,R-5Normal      Blood Glucose Monitoring Suppl (TRUE METRIX METER) w/Device KIT 1 kit by Does not apply route 2 times daily, Disp-1 kit,R-0Normal      ondansetron (ZOFRAN) 4 MG tablet Take 1 tablet by mouth 3 times daily as needed for Nausea or Vomiting, Disp-30 tablet,R-0Normal      clopidogrel (PLAVIX) 75 MG tablet TAKE 1 TABLET BY MOUTH DA JULIEN, Disp-90 tablet,R-5Normal      UltiCare Alcohol Swabs 70 % PADS USE TO TEST BLOOD GLUCOSE THREE TIMES DAILY, Disp-100 each,R-5Normal      Nebulizers (COMPRESSOR/NEBULIZER) MISC 4 TIMES DAILY PRN Starting Thu 3/26/2020, Disp-1 each, R-0, Normal albuterol (PROVENTIL) (2.5 MG/3ML) 0.083% nebulizer solution Take 3 mLs by nebulization every 4 hours as needed for Wheezing, Disp-120 each, R-5Normal      Lancet Devices (SIMPLE DIAGNOSTICS LANCING DEV) MISC Disp-1 each, R-11, Normal      Pharmacist Choice Lancets MISC Disp-300 each, R-5, Normal      UNIFINE PENTIPS 31G X 8 MM MISC Disp-100 each, R-5, Normal      budesonide-formoterol (SYMBICORT) 160-4.5 MCG/ACT AERO Inhale 2 puffs into the lungs daily, Disp-2 Inhaler, R-5Normal      albuterol sulfate HFA (VENTOLIN HFA) 108 (90 Base) MCG/ACT inhaler Inhale 2 puffs into the lungs every 4 hours as needed for Wheezing or Shortness of Breath, Disp-3 Inhaler, R-5Normal      ranolazine (RANEXA) 1000 MG extended release tablet Take 1 tablet by mouth 2 times daily, Disp-60 tablet, R-8Normal             Time Spent on discharge is more than 30 mints in the examination, evaluation, counseling and review of medications and discharge plan. Signed:    Verl Klinefelter, MD   1/1/2021      Thank you Garry Vega MD for the opportunity to be involved in this patient's care. If you have any questions or concerns please feel free to contact me at [phone].

## 2021-07-14 NOTE — PROCEDURE NOTE - PROCEDURE DATE TIME, MLM
14-Jul-2021 00:15
04-Jul-2021 21:57
14-Jul-2021 00:17
08-Jul-2021 08:14
08-Jul-2021 20:30
08-Jul-2021 21:00
14-Jul-2021 00:30
08-Jul-2021 08:14
08-Jul-2021 08:30

## 2021-07-14 NOTE — PROCEDURE NOTE - NSINFORMCONSENT_GEN_A_CORE
This was an emergent procedure.

## 2021-07-14 NOTE — PROCEDURE NOTE - NSTRACHPOSTINTU_RESP_A_CORE
Appropriate capnography/Breath sounds bilateral/Breath sounds equal/Chest X-Ray/Positive end tidal Co2 noted
Appropriate capnography/Breath sounds bilateral/Breath sounds equal/Chest excursion noted/Chest X-Ray/Positive end tidal Co2 noted

## 2021-07-14 NOTE — PROGRESS NOTE ADULT - NUTRITIONAL ASSESSMENT
This patient has been assessed with a concern for Malnutrition and has been determined to have a diagnosis/diagnoses of Severe protein-calorie malnutrition.    This patient is being managed with:   Diet NPO with Tube Feed-  Tube Feeding Modality: Orogastric  Jevity 1.2 Adan (JEVITY1.2RTH)  Total Volume for 24 Hours (mL): 720  Continuous  Starting Tube Feed Rate {mL per Hour}: 10  Increase Tube Feed Rate by (mL): 10     Every 2 hours  Until Goal Tube Feed Rate (mL per Hour): 30  Tube Feed Duration (in Hours): 24  Tube Feed Start Time: 10:00  Entered: Jul 8 2021  9:40AM    
This patient has been assessed with a concern for Malnutrition and has been determined to have a diagnosis/diagnoses of Severe protein-calorie malnutrition.    This patient is being managed with:   Diet NPO after Midnight-     NPO Start Date: 10-Jul-2021   NPO Start Time: 23:59  Except Medications  Entered: Jul 10 2021  6:30PM    Diet NPO with Tube Feed-  Tube Feeding Modality: Orogastric  Jevity 1.2 Adan (JEVITY1.2RTH)  Total Volume for 24 Hours (mL): 720  Continuous  Starting Tube Feed Rate {mL per Hour}: 10  Increase Tube Feed Rate by (mL): 10     Every 2 hours  Until Goal Tube Feed Rate (mL per Hour): 30  Tube Feed Duration (in Hours): 24  Tube Feed Start Time: 10:00  Entered: Jul 8 2021  9:40AM    
This patient has been assessed with a concern for Malnutrition and has been determined to have a diagnosis/diagnoses of Severe protein-calorie malnutrition.    This patient is being managed with:   Diet NPO with Tube Feed-  Tube Feeding Modality: Orogastric  Jevity 1.2 Adan (JEVITY1.2RTH)  Total Volume for 24 Hours (mL): 720  Continuous  Starting Tube Feed Rate {mL per Hour}: 10  Increase Tube Feed Rate by (mL): 10     Every 2 hours  Until Goal Tube Feed Rate (mL per Hour): 30  Tube Feed Duration (in Hours): 24  Tube Feed Start Time: 10:00  Entered: Jul 8 2021  9:40AM    
This patient has been assessed with a concern for Malnutrition and has been determined to have a diagnosis/diagnoses of Severe protein-calorie malnutrition.    This patient is being managed with:   Diet NPO-  Except Medications  Entered: Jul 6 2021 11:27PM

## 2021-07-14 NOTE — CHART NOTE - NSCHARTNOTEFT_GEN_A_CORE
: Pinky Martin     INDICATION: re-intubation, decreased TV, resp failure     PROCEDURE:  [ ] LIMITED ECHO  [x ] LIMITED CHEST  [ ] LIMITED RETROPERITONEAL  [ ] LIMITED ABDOMINAL  [ ] LIMITED DVT  [ ] NEEDLE GUIDANCE VASCULAR  [ ] NEEDLE GUIDANCE THORACENTESIS  [ ] NEEDLE GUIDANCE PARACENTESIS  [ ] NEEDLE GUIDANCE PERICARDIOCENTESIS  [ ] OTHER    FINDINGS:  A-line predominant on bilateral lung exam. Few scattered B lines     INTERPRETATION:  Grossly normal lung exam     Images stored on [employer].    Pinky Martin, PGY-2

## 2021-07-14 NOTE — PROGRESS NOTE ADULT - ATTENDING COMMENTS
Agree with above. Seen and examined with team on rounds. Critically ill requiring frequent bedside visits. Failed extubation trial yesterday. Does not want tracheostomy. All focus on comfort. Will provide support to his wife who is at bedside.

## 2021-07-14 NOTE — PROGRESS NOTE ADULT - SUBJECTIVE AND OBJECTIVE BOX
SUBJECTIVE AND OBJECTIVE: pt re-intubated  INTERVAL HPI/OVERNIGHT EVENTS: wife and brother-in-law at bedside, decision made for elective extubation, dnr/no re-intubation    DNR on chart: Yes    Allergies    No Known Allergies    Intolerances    MEDICATIONS  (STANDING):  atorvastatin 40 milliGRAM(s) Oral at bedtime  chlorhexidine 0.12% Liquid 15 milliLiter(s) Oral Mucosa every 12 hours  chlorhexidine 4% Liquid 1 Application(s) Topical <User Schedule>  donepezil 10 milliGRAM(s) Oral at bedtime  enoxaparin Injectable 40 milliGRAM(s) SubCutaneous daily  fentaNYL   Infusion. 0.5 MICROgram(s)/kG/Hr (3.57 mL/Hr) IV Continuous <Continuous>  glycopyrrolate Injectable 0.4 milliGRAM(s) IV Push every 6 hours  latanoprost 0.005% Ophthalmic Solution 1 Drop(s) Both EYES at bedtime  levETIRAcetam  IVPB 750 milliGRAM(s) IV Intermittent every 12 hours  midazolam Infusion 0.02 mG/kG/Hr (1.43 mL/Hr) IV Continuous <Continuous>  morphine  Infusion. 4 mG/Hr (4 mL/Hr) IV Continuous <Continuous>  norepinephrine Infusion 0.05 MICROgram(s)/kG/Min (6.68 mL/Hr) IV Continuous <Continuous>  OXcarbazepine Suspension 300 milliGRAM(s) Oral two times a day  riluzole 50 milliGRAM(s) Oral <User Schedule>    MEDICATIONS  (PRN):  acetaminophen    Suspension .. 650 milliGRAM(s) Oral every 6 hours PRN Temp greater or equal to 38C (100.4F)    ITEMS UNCHECKED ARE NOT PRESENT    PRESENT SYMPTOMS: [x ]Unable to obtain due to poor mentation   Source if other than patient:  [ ]Family   [ ]Team     Pain (Impact on QOL):    Location:  Minimal acceptable level (0-10 scale):                   Aggravating factors:  Quality:  Radiation:  Severity (0-10 scale):    Timing:    Dyspnea:                           [ ]Mild [ ]Moderate [ ]Severe  Anxiety:                             [ ]Mild [ ]Moderate [ ]Severe  Fatigue:                             [ ]Mild [ ]Moderate [ ]Severe  Nausea:                             [ ]Mild [ ]Moderate [ ]Severe  Loss of appetite:              [ ]Mild [ ]Moderate [ ]Severe  Constipation:                    [ ]Mild [ ]Moderate [ ]Severe    PAIN AD Score:	0  [email or web address]/[email or web address] (Ctrl + left click to view)    Other Symptoms:  [ ]All other review of systems negative     Karnofsky Performance Score/Palliative Performance Status Version 2:   20 %    http://palliative.info/resource_material/[email or web address].pdf    PHYSICAL EXAM:  Vital Signs Last 24 Hrs  T(C): 37.9 (14 Jul 2021 12:00), Max: 37.9 (14 Jul 2021 04:00)  T(F): 100.2 (14 Jul 2021 12:00), Max: 100.2 (14 Jul 2021 04:00)  HR: 82 (14 Jul 2021 15:00) (29 - 127)  BP: 123/63 (14 Jul 2021 14:00) (73/53 - 165/100)  BP(mean): 78 (14 Jul 2021 14:00) (55 - 137)  RR: 8 (14 Jul 2021 15:00) (0 - 32)  SpO2: 88% (14 Jul 2021 15:00) (86% - 100%) I&O's Summary    13 Jul 2021 07:01  -  14 Jul 2021 07:00  --------------------------------------------------------  IN: 1394.6 mL / OUT: 1150 mL / NET: 244.6 mL    14 Jul 2021 07:01  -  14 Jul 2021 16:07  --------------------------------------------------------  IN: 550.8 mL / OUT: 50 mL / NET: 500.8 mL     GENERAL:  [ ]Alert  [ ]Oriented x   [ ]Lethargic  [ ]Cachexia  [x ]Unarousable  [ ]Verbal  [ x]Non-Verbal  Behavioral:   [ ] Anxiety  [ ] Delirium [ ] Agitation [ ] Other  HEENT:  [ ]Normal   [ ]Dry mouth   [ x]ET Tube/Trach  [ ]Oral lesions  PULMONARY:   [ ]Clear [ ]Tachypnea  [ ]Audible excessive secretions   [x ]Rhonchi        [ ]Right [ ]Left [ ]Bilateral  [ ]Crackles        [ ]Right [ ]Left [ ]Bilateral  [ ]Wheezing     [ ]Right [ ]Left [ ]Bilateral  CARDIOVASCULAR:    [ ]Regular [ ]Irregular [ ]Tachy  [ ]Tan [ ]Murmur [ ]Other  GASTROINTESTINAL:  [ x]Soft  [ ]Distended   [ x]+BS  [ ]Non tender [ ]Tender  [ ]PEG [x ]OGT/ NGT   Last BM:   07-12-21 @ 07:01  -  07-13-21 @ 07:00  --------------------------------------------------------  OUT: 400 mL    07-14-21 @ 07:01  -  07-14-21 @ 16:07  --------------------------------------------------------  OUT: 50 mL     GENITOURINARY:  [ ]Normal [x ] Incontinent   [ ]Oliguria/Anuria   [ ]Wan  MUSCULOSKELETAL:   [ ]Normal   [ ]Weakness  [x ]Bed/Wheelchair bound [ ]Edema  NEUROLOGIC:   [ ]No focal deficits  [x ] Cognitive impairment  [ ] Dysphagia [ ]Dysarthria [ ] Paresis [ ]Other   SKIN:   [x ]Normal   [ ]Pressure ulcer(s)  [ ]Rash    CRITICAL CARE:  [ ] Shock Present  [ ]Septic [ ]Cardiogenic [ ]Neurologic [ ]Hypovolemic  [ ]  Vasopressors [ ]  Inotropes   [ x] Respiratory failure present  [ x] Acute  [ ] Chronic [ ] Hypoxic  [ ] Hypercarbic [ ] Other  [ ] Other organ failure     GRIEF   [x ] Yes  [ ] No    LABS:                        13.1   18.00 )-----------( 350      ( 14 Jul 2021 04:38 )             40.8   07-14    141  |  101  |  17  ----------------------------<  109<H>  4.6   |  30  |  0.53    Ca    8.5      14 Jul 2021 04:38  Phos  2.2     07-14  Mg     2.00     07-14    TPro  6.0  /  Alb  3.1<L>  /  TBili  0.4  /  DBili  x   /  AST  37  /  ALT  62<H>  /  AlkPhos  59  07-14    RADIOLOGY & ADDITIONAL STUDIES: reviewed    Protein Calorie Malnutrition Present: [ ] yes [ ] no  [x ] PPSV2 < or = 30%  [ ] significant weight loss [ ] poor nutritional intake [ ] anasarca [ ] catabolic state Albumin, Serum: 3.1 g/dL (07-14-21 @ 04:38)  Artificial Nutrition [ ]     REFERRALS:   x]Chaplaincy  [ ] Hospice  [ ]Child Life  [ ]Social Work  [ ]Case management [ ]Holistic Therapy   Goals of Care Document:

## 2021-07-14 NOTE — PROCEDURE NOTE - ADDITIONAL PROCEDURE DETAILS
Critically ill pt requiring PIV access for medical management and/or pressor support. Under US guidance identified Rt UE vein, and successfully placed 20g x 6cm arrow extend dwell angiocath into vessel. . Placement confirmed s/p with ultrasound and catheter determined to be in patent lumen of vein. Pt tolerated well w/o complication.
18G 10CM long, Power-glide midline catheter placed under US in Lt basilic vein
arrow extended dwell iv
20G 10cm long, power-glide midline catheter placed under US in Lt cephalic vein

## 2021-07-14 NOTE — PROGRESS NOTE ADULT - ATTENDING SUPERVISION STATEMENT
Resident
Resident/Fellow
ACP/Resident

## 2021-07-14 NOTE — PROGRESS NOTE ADULT - SUBJECTIVE AND OBJECTIVE BOX
Rafael Fabian  PGY-2 | Internal Medicine      INTERVAL HPI/OVERNIGHT EVENTS: Patient reintubated overnight, unable to pull adequate volumes with evidence of tiring. Became hypothermic with new leukocytosis, sputum/blood cultures pending. Currently on levophed, fentanyl, and midazolam.     SUBJECTIVE: Patient seen and examined at bedside.     CONSTITUTIONAL: No weakness, fevers or chills  EYES/ENT: No visual changes;  No vertigo or throat pain   NECK: No pain or stiffness  RESPIRATORY: No cough, wheezing, hemoptysis; No shortness of breath  CARDIOVASCULAR: No chest pain or palpitations  GASTROINTESTINAL: No abdominal or epigastric pain. No nausea, vomiting, or hematemesis; No diarrhea or constipation. No melena or hematochezia.  GENITOURINARY: No dysuria, frequency or hematuria  NEUROLOGICAL: No numbness or weakness  SKIN: No itching, rashes    OBJECTIVE:    VITAL SIGNS:  ICU Vital Signs Last 24 Hrs  T(C): 37.9 (14 Jul 2021 04:00), Max: 37.9 (14 Jul 2021 04:00)  T(F): 100.2 (14 Jul 2021 04:00), Max: 100.2 (14 Jul 2021 04:00)  HR: 89 (14 Jul 2021 07:00) (29 - 127)  BP: 118/71 (14 Jul 2021 07:00) (73/53 - 218/195)  BP(mean): 84 (14 Jul 2021 07:00) (55 - 204)  ABP: --  ABP(mean): --  RR: 16 (14 Jul 2021 07:00) (0 - 32)  SpO2: 100% (14 Jul 2021 07:00) (86% - 100%)    Mode: AC/ CMV (Assist Control/ Continuous Mandatory Ventilation), RR (machine): 24, TV (machine): 400, FiO2: 80, PEEP: 8, ITime: 0.77, MAP: 14, PIP: 27    07-13 @ 07:01  -  07-14 @ 07:00  --------------------------------------------------------  IN: 1384.6 mL / OUT: 1150 mL / NET: 234.6 mL      CAPILLARY BLOOD GLUCOSE      POCT Blood Glucose.: 164 mg/dL (14 Jul 2021 01:20)      PHYSICAL EXAM:    General: NAD  HEENT: NC/AT; PERRL, clear conjunctiva  Neck: supple  Respiratory: CTA b/l  Cardiovascular: +S1/S2; RRR  Abdomen: soft, NT/ND; +BS x4  Extremities: WWP, 2+ peripheral pulses b/l; no LE edema  Skin: normal color and turgor; no rash  Neurological:    MEDICATIONS:  MEDICATIONS  (STANDING):  atorvastatin 40 milliGRAM(s) Oral at bedtime  chlorhexidine 0.12% Liquid 15 milliLiter(s) Oral Mucosa every 12 hours  chlorhexidine 4% Liquid 1 Application(s) Topical <User Schedule>  donepezil 10 milliGRAM(s) Oral at bedtime  enoxaparin Injectable 40 milliGRAM(s) SubCutaneous daily  fentaNYL   Infusion. 0.5 MICROgram(s)/kG/Hr (3.57 mL/Hr) IV Continuous <Continuous>  latanoprost 0.005% Ophthalmic Solution 1 Drop(s) Both EYES at bedtime  levETIRAcetam  IVPB 750 milliGRAM(s) IV Intermittent every 12 hours  midazolam Infusion 0.02 mG/kG/Hr (1.43 mL/Hr) IV Continuous <Continuous>  norepinephrine Infusion 0.05 MICROgram(s)/kG/Min (6.68 mL/Hr) IV Continuous <Continuous>  OXcarbazepine Suspension 300 milliGRAM(s) Oral two times a day  riluzole 50 milliGRAM(s) Oral <User Schedule>    MEDICATIONS  (PRN):  acetaminophen    Suspension .. 650 milliGRAM(s) Oral every 6 hours PRN Temp greater or equal to 38C (100.4F)      ALLERGIES:  Allergies    No Known Allergies    Intolerances        LABS:                        13.1   18.00 )-----------( 350      ( 14 Jul 2021 04:38 )             40.8     07-14    141  |  101  |  17  ----------------------------<  109<H>  4.6   |  30  |  0.53    Ca    8.5      14 Jul 2021 04:38  Phos  2.2     07-14  Mg     2.00     07-14    TPro  6.0  /  Alb  3.1<L>  /  TBili  0.4  /  DBili  x   /  AST  37  /  ALT  62<H>  /  AlkPhos  59  07-14          RADIOLOGY & ADDITIONAL TESTS: Reviewed.

## 2021-07-14 NOTE — CHART NOTE - NSCHARTNOTEFT_GEN_A_CORE
Medicine Death Note    Called to bedside to evaluate the patient for asystole.    On physical exam, patient did not respond to verbal or noxious stimuli.  No spontaneous respirations.  Absent heart and breath sounds.  Absent radial and carotid pulses.   Pupils are fixed and dilated, no corneal reflex.  EKG rhythm strip shows asystole.   Patient pronounced dead at 3:16.  Attending notified.  Family declines autopsy.    Wilson Parsons MD  , PGY3  pager: [phone]

## 2021-07-14 NOTE — PROGRESS NOTE ADULT - REASON FOR ADMISSION
weakness, respiratory distress

## 2021-07-14 NOTE — PROCEDURE NOTE - PRACTITIONER PERFORMING THE TIME OUT
myself
Julio díaz
shellie barrow pa-c
Silvia Mai PA-C
Ingris Pavon
shellie barrow pa-c
Julio Shipman

## 2021-07-14 NOTE — PROCEDURE NOTE - NSSITEPREP_SKIN_A_CORE
chlorhexidine/Adherence to aseptic technique: hand hygiene prior to donning barriers (gown, gloves), don cap and mask, sterile drape over patient
chlorhexidine/Adherence to aseptic technique: hand hygiene prior to donning barriers (gown, gloves), don cap and mask, sterile drape over patient
chlorhexidine
chlorhexidine
chlorhexidine/Adherence to aseptic technique: hand hygiene prior to donning barriers (gown, gloves), don cap and mask, sterile drape over patient

## 2021-07-14 NOTE — PROGRESS NOTE ADULT - PROBLEM SELECTOR PLAN 2
Suspected ALS  Likely driving respiratory failure  Pt expressed his wishes in the past that he wouldn't want to be dependent on machines/artificial support

## 2021-07-14 NOTE — PROGRESS NOTE ADULT - ASSESSMENT
70M w/ PMHx seizures, HTN, and possible ALS presenting for weakness and shortness of breath, found to be hypoxic and hypercapneic and intubated for airway protection in setting of poor inspiratory effort.     #Neuro  - currently on fentanyl and versed   -outpt w/u to eval for ALS/bekah disease (Dr. Carrasco), on home riluzole  -house neuro consulted, needs to r/o other causes since ALS is a clinical dx, f/u neuro recs  -c/w home keppra, oxcarbazepine, riluzole  -CTH neg for acute pathology  -PT/OT eval 7/14 - limited functional status rec. subacute rehab    #Pulm  -acute on chronic hypercapneic respiratory failure, initial VBG pH 7.17, pCO2>110, bicarb 32; intubated in the ED. CXT no acute finding. Likely in the setting of motor neuron dysfunction.   -Bedside US didn't show consolidation on admission  -CXR 7/8 showed RLL consolidation on our read, treating with antibiotic as below  -reintubated 7/8 for hypoxia respiratory failure likely 2/2 aspiration; bronch w/ scant thick secretion, cultures NGTD  -extubated 7/13, reintubated < 24 hrs due to inability to pull adequate tidal volumes   -will discuss with wife plans for tracheostomy tube   -Discussed with neuro regarding etiology of his respiration status. ALS is high on the differential since clinical presentation and family hx. Will discuss GOC with patient when patient is able to participate in the discussion      #CV  -levophed restarted in the setting sedatives, wean as tolerated  -continue to monitor    #GI  -no active issue  -NPO w/ tube feed    #Renal  -elevated bicarb on admission, likely in the setting of chronic respiratory acidosis, now resolved  - discontinue cabrera TODAY  - monitor urine output    #Endo  -no active issue    #heme  -lovenox    #ID  -UA neg on admission now +, zosyn as below  -CXR and bedside US no evidence of PNA, CXR 7/8 showed consolidation so c/w zosyn, plan for 7-day course  -hypothermic 7/14 with newly elevated WBC 18  -repeat sputum cultures/blood cultures ordered, pending     #Ethics  -Full code. Palliative consulted. GOC with patient - discussed possible tracheostomy with patient and spouse on 7/10, they will think about it. Now that patient has failed extubation x 2, will most likely require long term trach if in line with patient and family's goals.

## 2021-07-14 NOTE — PROGRESS NOTE ADULT - ASSESSMENT
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 Palliative Care consulted for complex decision making in the setting of advanced illness.

## 2021-07-14 NOTE — PROCEDURE NOTE - NSPROCDETAILS_GEN_ALL_CORE
location identified, draped/prepped, sterile technique used/blood seen on insertion/dressing applied/flushes easily/secured in place/sterile technique, catheter placed
nasogastric
patient pre-oxygenated, tube inserted, placement confirmed
audible air bolus/placement confirmed by auscultation/orogastric/gastric secretions aspirated, placement confirmed/bowel sounds present to 4 quadrants
location identified, draped/prepped, sterile technique used/blood seen on insertion/dressing applied/flushes easily/secured in place/sterile technique, catheter placed
location identified, draped/prepped, sterile technique used/blood seen on insertion/dressing applied/flushes easily/secured in place
location identified, draped/prepped, sterile technique used/blood seen on insertion/dressing applied/flushes easily/secured in place/sterile technique, catheter placed
location identified, draped/prepped, sterile technique used/blood seen on insertion/dressing applied/flushes easily/secured in place/sterile technique, catheter placed
patient pre-oxygenated, tube inserted, placement confirmed

## 2021-07-14 NOTE — PROGRESS NOTE ADULT - PROBLEM SELECTOR PLAN 5
Pt now DNR/Do not re-intubate  Plan is for elective extubation  Chaplaincy following  Main goal is comfort care  SYmptom recs as above  Please page for uncontrolled symptoms [id number] Pt now DNR/Do not re-intubate --> MOLST completed and in the chart  Plan is for elective extubation  Chaplaincy following  Main goal is comfort care  [place]om recs as above  Please page for uncontrolled symptoms [phone]

## 2021-07-14 NOTE — PROCEDURE NOTE - NSINDICATIONS_GEN_A_CORE
airway protection/respiratory failure
feeds
emergency venous access
feeds
emergency venous access
emergency venous access
antibiotic therapy/other
antibiotic therapy
airway protection/respiratory distress

## 2021-07-14 NOTE — PROCEDURE NOTE - NSPOSTCAREGUIDE_GEN_A_CORE
Instructed patient/caregiver regarding signs and symptoms of infection
Verbal/written post procedure instructions were given to patient/caregiver
Care for catheter as per unit/ICU protocols
Verbal/written post procedure instructions were given to patient/caregiver

## 2021-07-14 NOTE — PROGRESS NOTE ADULT - NSICDXPILOT_GEN_ALL_CORE
[place]

## 2021-07-14 NOTE — PROGRESS NOTE ADULT - PROBLEM SELECTOR PLAN 1
Decision made for elective extubation  Would recommend:  - would d/c versed infusion  - Can continue with fentanyl infusion, would likely decrease mcg/hr to 100mcg/hr  - would have PRNs for breakthrough SOB, would recommend Dilaudid 2mg IV q2-3hrs PRN for SOB/dyspnea, can titrate drip as needed  - Can use ativan 0.5mg IV q6hrs PRN for agitation/dyspnea refractory to opioids  - Can use Robinul 0.4mg IV q4hrs PRN for increased secretions

## 2021-07-16 LAB
CULTURE RESULTS: NO GROWTH — SIGNIFICANT CHANGE UP
CULTURE RESULTS: SIGNIFICANT CHANGE UP
CULTURE RESULTS: SIGNIFICANT CHANGE UP
SPECIMEN SOURCE: SIGNIFICANT CHANGE UP

## 2021-07-19 LAB
CULTURE RESULTS: SIGNIFICANT CHANGE UP
CULTURE RESULTS: SIGNIFICANT CHANGE UP
SPECIMEN SOURCE: SIGNIFICANT CHANGE UP
SPECIMEN SOURCE: SIGNIFICANT CHANGE UP

## 2021-07-21 LAB — LRP4 AUTOANTIBODY TEST: SIGNIFICANT CHANGE UP

## 2021-07-30 ENCOUNTER — APPOINTMENT (OUTPATIENT)
Dept: NEUROLOGY | Facility: CLINIC | Age: 71
End: 2021-07-30

## 2021-08-07 LAB
CULTURE RESULTS: SIGNIFICANT CHANGE UP
SPECIMEN SOURCE: SIGNIFICANT CHANGE UP

## 2021-08-28 LAB
CULTURE RESULTS: SIGNIFICANT CHANGE UP
SPECIMEN SOURCE: SIGNIFICANT CHANGE UP

## 2022-01-26 NOTE — ED PROCEDURE NOTE - NS ED PROCEUDURE1 POST INTUBATION REVIEW
Appropriate capnography/Breath sounds bilateral/Positive end tidal Co2 noted
Patient is not pregnant (male or female)

## 2022-06-21 NOTE — PHYSICAL THERAPY INITIAL EVALUATION ADULT - PLANNED THERAPY INTERVENTIONS, PT EVAL
Hide Anticipated Plan (Based On Presumed Biopsy Results)?: No
Billing Type: Third-Party Bill
balance training/bed mobility training/gait training/strengthening/transfer training
Silver Nitrate Text: The wound bed was treated with silver nitrate after the biopsy was performed.
Depth Of Biopsy: dermis
Anesthesia Volume In Cc (Will Not Render If 0): 0.5
Dressing: bandage
Additional Anesthesia Volume In Cc (Will Not Render If 0): 0
Electrodesiccation And Curettage Text: The wound bed was treated with electrodesiccation and curettage after the biopsy was performed.
bed mobility training/gait training/strengthening/transfer training
Hemostasis: Aluminum Chloride
Notification Instructions: Patient will be notified of biopsy results. However, patient instructed to call the office if not contacted within 2 weeks.
Consent: Written consent was obtained and risks were reviewed including but not limited to scarring, infection, bleeding, scabbing, incomplete removal, nerve damage and allergy to anesthesia.
Electrodesiccation Text: The wound bed was treated with electrodesiccation after the biopsy was performed.
Was A Bandage Applied: Yes
Biopsy Type: H and E
Cryotherapy Text: The wound bed was treated with cryotherapy after the biopsy was performed.
Post-Care Instructions: I reviewed with the patient in detail post-care instructions. Patient is to keep the biopsy site dry overnight, and then apply bacitracin twice daily until healed. Patient may apply hydrogen peroxide soaks to remove any crusting.
Detail Level: Simple
Anesthesia Type: 1% lidocaine with epinephrine
Wound Care: Petrolatum
Curettage Text: The wound bed was treated with curettage after the biopsy was performed.
Type Of Destruction Used: Curettage
Biopsy Method: Dermablade
Information: Selecting Yes will display possible errors in your note based on the variables you have selected. This validation is only offered as a suggestion for you. PLEASE NOTE THAT THE VALIDATION TEXT WILL BE REMOVED WHEN YOU FINALIZE YOUR NOTE. IF YOU WANT TO FAX A PRELIMINARY NOTE YOU WILL NEED TO TOGGLE THIS TO 'NO' IF YOU DO NOT WANT IT IN YOUR FAXED NOTE.

## 2023-02-28 NOTE — DIETITIAN INITIAL EVALUATION ADULT. - ENTER FROM (CAL/KG)
25 [facility]alt[employer] Counseling: I discussed with the patient the risks of valacyclovir including but not limited to kidney damage, nausea, vomiting and severe allergy.  The patient understands that if the infection seems to be worsening or is not improving, they are to call.

## 2023-10-21 NOTE — PHYSICAL THERAPY INITIAL EVALUATION ADULT - PHYSICAL ASSIST/NONPHYSICAL ASSIST, REHAB EVAL
Meth, fentanyl, benzo and xanax.  Patient states he needs help getting off these drugs.  Patient states meth is the one he uses daily.      Triage Assessment (Adult)       Row Name 10/20/23 5595          Triage Assessment    Airway WDL WDL        Respiratory WDL    Respiratory WDL WDL        Skin Circulation/Temperature WDL    Skin Circulation/Temperature WDL WDL        Cardiac WDL    Cardiac WDL WDL        Peripheral/Neurovascular WDL    Peripheral Neurovascular WDL WDL        Cognitive/Neuro/Behavioral WDL    Cognitive/Neuro/Behavioral WDL X                     
verbal cues/1 person assist